# Patient Record
Sex: FEMALE | Race: BLACK OR AFRICAN AMERICAN | NOT HISPANIC OR LATINO
[De-identification: names, ages, dates, MRNs, and addresses within clinical notes are randomized per-mention and may not be internally consistent; named-entity substitution may affect disease eponyms.]

---

## 2020-11-09 ENCOUNTER — TRANSCRIPTION ENCOUNTER (OUTPATIENT)
Age: 25
End: 2020-11-09

## 2021-01-01 ENCOUNTER — INPATIENT (INPATIENT)
Facility: HOSPITAL | Age: 26
LOS: 12 days | Discharge: HOME HEALTH | End: 2021-01-14
Attending: STUDENT IN AN ORGANIZED HEALTH CARE EDUCATION/TRAINING PROGRAM | Admitting: STUDENT IN AN ORGANIZED HEALTH CARE EDUCATION/TRAINING PROGRAM
Payer: COMMERCIAL

## 2021-01-01 VITALS
TEMPERATURE: 98 F | OXYGEN SATURATION: 99 % | RESPIRATION RATE: 24 BRPM | SYSTOLIC BLOOD PRESSURE: 125 MMHG | WEIGHT: 212.53 LBS | DIASTOLIC BLOOD PRESSURE: 77 MMHG | HEART RATE: 132 BPM

## 2021-01-01 RX ORDER — OXYCODONE HYDROCHLORIDE 5 MG/1
5 TABLET ORAL ONCE
Refills: 0 | Status: DISCONTINUED | OUTPATIENT
Start: 2021-01-01 | End: 2021-01-01

## 2021-01-01 RX ORDER — SODIUM CHLORIDE 9 MG/ML
1000 INJECTION, SOLUTION INTRAVENOUS ONCE
Refills: 0 | Status: COMPLETED | OUTPATIENT
Start: 2021-01-01 | End: 2021-01-01

## 2021-01-01 RX ADMIN — OXYCODONE HYDROCHLORIDE 5 MILLIGRAM(S): 5 TABLET ORAL at 23:41

## 2021-01-01 RX ADMIN — SODIUM CHLORIDE 1000 MILLILITER(S): 9 INJECTION, SOLUTION INTRAVENOUS at 23:44

## 2021-01-01 NOTE — ED ADULT NURSE NOTE - OBJECTIVE STATEMENT
patient complaining of pain to chest and back patient complaining of pain in central upper back and left chest pain and left upper arm pain that is worse with deep breath patient tachypneic with movement had recent travel to [place] and birth control pills

## 2021-01-01 NOTE — ED ADULT TRIAGE NOTE - CHIEF COMPLAINT QUOTE
PT C/O CP radiating to left arm and back x 3 days. States recently returned from [place] on Monday. Denies sick known sick contacts, daily meds. States had tonsillectomy 2 weeks ago; PT takes PO birth control.

## 2021-01-01 NOTE — ED PROVIDER NOTE - OBJECTIVE STATEMENT
Liborio: Tonight, pain in central upper back f/b L CP and L upper arm pain, worse w/ deep breath. HR ~130. Had 3.5 hr car ride a few weeks ago. On birth control. Had recent tonsillectomy. Not likely PNA: no infectious Sx (eg, purulent cough). HEART score low. No SOB. No palpitations. Not improve w/ Icy/Hot. No FHx VTE or early heart disease.     Also, R low back and buttock pain radiating down R leg.

## 2021-01-01 NOTE — ED ADULT NURSE NOTE - CHIEF COMPLAINT QUOTE
PT C/O CP radiating to left arm and back x 3 days. States recently returned from [place] on Monday. Denies sick known sick contacts, daily meds. States had tonsillectomy 2 weeks ago; PT takes PO birth control.

## 2021-01-01 NOTE — ED PROVIDER NOTE - CLINICAL SUMMARY MEDICAL DECISION MAKING FREE TEXT BOX
Liborio: Pleuritic CP and tachycardia. Concern for PE. Not likely PNA: no infectious Sx (eg, purulent cough). HEART score low (ie, not likely ACS). Get CT angio, HCG, labs.

## 2021-01-01 NOTE — ED PROVIDER NOTE - CARE PLAN
Principal Discharge DX:	Chest pain   Principal Discharge DX:	Pulmonary embolism  Secondary Diagnosis:	Renal infarct  Secondary Diagnosis:	DVT (deep venous thrombosis)

## 2021-01-01 NOTE — ED PROVIDER NOTE - ATTENDING CONTRIBUTION TO CARE
I performed a face-to-face evaluation of the patient and performed a history and physical examination. I agree with the history and physical examination.    Pleuritic CP and tachycardia. Concern for PE. Not likely PNA: no infectious Sx (eg, purulent cough). HEART score low (ie, not likely ACS). Get CT angio, HCG, labs.

## 2021-01-01 NOTE — ED PROVIDER NOTE - PHYSICAL EXAMINATION
Well appearing, well nourished, awake, alert, oriented to person, place, time/situation and in no apparent distress.    Airway patent    Eyes without scleral injection. No jaundice.    Strong pulse.    Respirations unlabored. Lungs clear.     Abdomen soft, non-tender, no guarding. No CVAT.    Spine appears normal, range of motion is not limited, no muscle or joint tenderness.    Alert and oriented, no gross motor or sensory deficits.    Skin normal color for race, warm, dry and intact. No evidence of rash.    No SI/HI.

## 2021-01-02 DIAGNOSIS — I26.99 OTHER PULMONARY EMBOLISM WITHOUT ACUTE COR PULMONALE: ICD-10-CM

## 2021-01-02 DIAGNOSIS — Z90.89 ACQUIRED ABSENCE OF OTHER ORGANS: Chronic | ICD-10-CM

## 2021-01-02 DIAGNOSIS — R52 PAIN, UNSPECIFIED: ICD-10-CM

## 2021-01-02 DIAGNOSIS — I80.219 PHLEBITIS AND THROMBOPHLEBITIS OF UNSPECIFIED ILIAC VEIN: ICD-10-CM

## 2021-01-02 DIAGNOSIS — Z02.9 ENCOUNTER FOR ADMINISTRATIVE EXAMINATIONS, UNSPECIFIED: ICD-10-CM

## 2021-01-02 DIAGNOSIS — N28.0 ISCHEMIA AND INFARCTION OF KIDNEY: ICD-10-CM

## 2021-01-02 DIAGNOSIS — I82.423 ACUTE EMBOLISM AND THROMBOSIS OF ILIAC VEIN, BILATERAL: ICD-10-CM

## 2021-01-02 LAB
ALBUMIN SERPL ELPH-MCNC: 4 G/DL — SIGNIFICANT CHANGE UP (ref 3.3–5)
ALP SERPL-CCNC: 52 U/L — SIGNIFICANT CHANGE UP (ref 40–120)
ALT FLD-CCNC: 42 U/L — HIGH (ref 4–33)
ANION GAP SERPL CALC-SCNC: 15 MMOL/L — HIGH (ref 7–14)
APPEARANCE UR: ABNORMAL
APTT BLD: 144.2 SEC — CRITICAL HIGH (ref 27–36.3)
APTT BLD: 28.3 SEC — SIGNIFICANT CHANGE UP (ref 27–36.3)
APTT BLD: 60.9 SEC — HIGH (ref 27–36.3)
AST SERPL-CCNC: 33 U/L — HIGH (ref 4–32)
AT III ACT/NOR PPP CHRO: 86 % — SIGNIFICANT CHANGE UP (ref 76–140)
BASOPHILS # BLD AUTO: 0 K/UL — SIGNIFICANT CHANGE UP (ref 0–0.2)
BASOPHILS NFR BLD AUTO: 0 % — SIGNIFICANT CHANGE UP (ref 0–2)
BILIRUB SERPL-MCNC: 0.3 MG/DL — SIGNIFICANT CHANGE UP (ref 0.2–1.2)
BILIRUB UR-MCNC: NEGATIVE — SIGNIFICANT CHANGE UP
BUN SERPL-MCNC: 6 MG/DL — LOW (ref 7–23)
CALCIUM SERPL-MCNC: 9.5 MG/DL — SIGNIFICANT CHANGE UP (ref 8.4–10.5)
CHLORIDE SERPL-SCNC: 102 MMOL/L — SIGNIFICANT CHANGE UP (ref 98–107)
CO2 SERPL-SCNC: 21 MMOL/L — LOW (ref 22–31)
COLOR SPEC: YELLOW — SIGNIFICANT CHANGE UP
CREAT SERPL-MCNC: 0.93 MG/DL — SIGNIFICANT CHANGE UP (ref 0.5–1.3)
DIFF PNL FLD: ABNORMAL
EOSINOPHIL # BLD AUTO: 0.19 K/UL — SIGNIFICANT CHANGE UP (ref 0–0.5)
EOSINOPHIL NFR BLD AUTO: 1.8 % — SIGNIFICANT CHANGE UP (ref 0–6)
GLUCOSE SERPL-MCNC: 94 MG/DL — SIGNIFICANT CHANGE UP (ref 70–99)
GLUCOSE UR QL: NEGATIVE — SIGNIFICANT CHANGE UP
HCG SERPL-ACNC: <5 MIU/ML — SIGNIFICANT CHANGE UP
HCT VFR BLD CALC: 29.2 % — LOW (ref 34.5–45)
HCT VFR BLD CALC: 35.1 % — SIGNIFICANT CHANGE UP (ref 34.5–45)
HGB BLD-MCNC: 10.2 G/DL — LOW (ref 11.5–15.5)
HGB BLD-MCNC: 9 G/DL — LOW (ref 11.5–15.5)
IANC: 7.31 K/UL — SIGNIFICANT CHANGE UP (ref 1.5–8.5)
INR BLD: 1.32 RATIO — HIGH (ref 0.88–1.16)
KETONES UR-MCNC: ABNORMAL
LEUKOCYTE ESTERASE UR-ACNC: ABNORMAL
LYMPHOCYTES # BLD AUTO: 1.85 K/UL — SIGNIFICANT CHANGE UP (ref 1–3.3)
LYMPHOCYTES # BLD AUTO: 17.5 % — SIGNIFICANT CHANGE UP (ref 13–44)
MAGNESIUM SERPL-MCNC: 2.3 MG/DL — SIGNIFICANT CHANGE UP (ref 1.6–2.6)
MCHC RBC-ENTMCNC: 20.4 PG — LOW (ref 27–34)
MCHC RBC-ENTMCNC: 21.1 PG — LOW (ref 27–34)
MCHC RBC-ENTMCNC: 29.1 GM/DL — LOW (ref 32–36)
MCHC RBC-ENTMCNC: 30.8 GM/DL — LOW (ref 32–36)
MCV RBC AUTO: 68.4 FL — LOW (ref 80–100)
MCV RBC AUTO: 70.3 FL — LOW (ref 80–100)
MONOCYTES # BLD AUTO: 0.74 K/UL — SIGNIFICANT CHANGE UP (ref 0–0.9)
MONOCYTES NFR BLD AUTO: 7 % — SIGNIFICANT CHANGE UP (ref 2–14)
NEUTROPHILS # BLD AUTO: 7.68 K/UL — HIGH (ref 1.8–7.4)
NEUTROPHILS NFR BLD AUTO: 72.8 % — SIGNIFICANT CHANGE UP (ref 43–77)
NITRITE UR-MCNC: NEGATIVE — SIGNIFICANT CHANGE UP
NRBC # BLD: 0 /100 WBCS — SIGNIFICANT CHANGE UP
NRBC # FLD: 0 K/UL — SIGNIFICANT CHANGE UP
PH UR: 6.5 — SIGNIFICANT CHANGE UP (ref 5–8)
PHOSPHATE SERPL-MCNC: 1.2 MG/DL — LOW (ref 2.5–4.5)
PLATELET # BLD AUTO: 316 K/UL — SIGNIFICANT CHANGE UP (ref 150–400)
PLATELET # BLD AUTO: 360 K/UL — SIGNIFICANT CHANGE UP (ref 150–400)
POTASSIUM SERPL-MCNC: 3.5 MMOL/L — SIGNIFICANT CHANGE UP (ref 3.5–5.3)
POTASSIUM SERPL-SCNC: 3.5 MMOL/L — SIGNIFICANT CHANGE UP (ref 3.5–5.3)
PROT SERPL-MCNC: 8.1 G/DL — SIGNIFICANT CHANGE UP (ref 6–8.3)
PROT UR-MCNC: ABNORMAL
PROTHROM AB SERPL-ACNC: 15 SEC — HIGH (ref 10.6–13.6)
RBC # BLD: 4.27 M/UL — SIGNIFICANT CHANGE UP (ref 3.8–5.2)
RBC # BLD: 4.99 M/UL — SIGNIFICANT CHANGE UP (ref 3.8–5.2)
RBC # FLD: 15.2 % — HIGH (ref 10.3–14.5)
RBC # FLD: 15.3 % — HIGH (ref 10.3–14.5)
SARS-COV-2 RNA SPEC QL NAA+PROBE: SIGNIFICANT CHANGE UP
SODIUM SERPL-SCNC: 138 MMOL/L — SIGNIFICANT CHANGE UP (ref 135–145)
SP GR SPEC: 1.02 — SIGNIFICANT CHANGE UP (ref 1.01–1.02)
TROPONIN T, HIGH SENSITIVITY RESULT: <6 NG/L — SIGNIFICANT CHANGE UP
UROBILINOGEN FLD QL: ABNORMAL
WBC # BLD: 10.55 K/UL — HIGH (ref 3.8–10.5)
WBC # BLD: 11 K/UL — HIGH (ref 3.8–10.5)
WBC # FLD AUTO: 10.55 K/UL — HIGH (ref 3.8–10.5)
WBC # FLD AUTO: 11 K/UL — HIGH (ref 3.8–10.5)

## 2021-01-02 PROCEDURE — 99285 EMERGENCY DEPT VISIT HI MDM: CPT

## 2021-01-02 PROCEDURE — 93971 EXTREMITY STUDY: CPT | Mod: 26,LT

## 2021-01-02 PROCEDURE — 74177 CT ABD & PELVIS W/CONTRAST: CPT | Mod: 26

## 2021-01-02 PROCEDURE — 71275 CT ANGIOGRAPHY CHEST: CPT | Mod: 26

## 2021-01-02 PROCEDURE — 99223 1ST HOSP IP/OBS HIGH 75: CPT | Mod: GC

## 2021-01-02 RX ORDER — HEPARIN SODIUM 5000 [USP'U]/ML
4000 INJECTION INTRAVENOUS; SUBCUTANEOUS EVERY 6 HOURS
Refills: 0 | Status: DISCONTINUED | OUTPATIENT
Start: 2021-01-02 | End: 2021-01-09

## 2021-01-02 RX ORDER — INFLUENZA VIRUS VACCINE 15; 15; 15; 15 UG/.5ML; UG/.5ML; UG/.5ML; UG/.5ML
0.5 SUSPENSION INTRAMUSCULAR ONCE
Refills: 0 | Status: DISCONTINUED | OUTPATIENT
Start: 2021-01-02 | End: 2021-01-14

## 2021-01-02 RX ORDER — MORPHINE SULFATE 50 MG/1
2 CAPSULE, EXTENDED RELEASE ORAL EVERY 4 HOURS
Refills: 0 | Status: DISCONTINUED | OUTPATIENT
Start: 2021-01-02 | End: 2021-01-04

## 2021-01-02 RX ORDER — HEPARIN SODIUM 5000 [USP'U]/ML
8000 INJECTION INTRAVENOUS; SUBCUTANEOUS ONCE
Refills: 0 | Status: COMPLETED | OUTPATIENT
Start: 2021-01-02 | End: 2021-01-02

## 2021-01-02 RX ORDER — MORPHINE SULFATE 50 MG/1
4 CAPSULE, EXTENDED RELEASE ORAL EVERY 6 HOURS
Refills: 0 | Status: DISCONTINUED | OUTPATIENT
Start: 2021-01-02 | End: 2021-01-02

## 2021-01-02 RX ORDER — ACETAMINOPHEN 500 MG
650 TABLET ORAL EVERY 6 HOURS
Refills: 0 | Status: DISCONTINUED | OUTPATIENT
Start: 2021-01-02 | End: 2021-01-04

## 2021-01-02 RX ORDER — MORPHINE SULFATE 50 MG/1
4 CAPSULE, EXTENDED RELEASE ORAL ONCE
Refills: 0 | Status: DISCONTINUED | OUTPATIENT
Start: 2021-01-02 | End: 2021-01-02

## 2021-01-02 RX ORDER — HEPARIN SODIUM 5000 [USP'U]/ML
8000 INJECTION INTRAVENOUS; SUBCUTANEOUS EVERY 6 HOURS
Refills: 0 | Status: DISCONTINUED | OUTPATIENT
Start: 2021-01-02 | End: 2021-01-09

## 2021-01-02 RX ORDER — HEPARIN SODIUM 5000 [USP'U]/ML
INJECTION INTRAVENOUS; SUBCUTANEOUS
Qty: 25000 | Refills: 0 | Status: DISCONTINUED | OUTPATIENT
Start: 2021-01-02 | End: 2021-01-09

## 2021-01-02 RX ADMIN — MORPHINE SULFATE 2 MILLIGRAM(S): 50 CAPSULE, EXTENDED RELEASE ORAL at 14:56

## 2021-01-02 RX ADMIN — HEPARIN SODIUM 1800 UNIT(S)/HR: 5000 INJECTION INTRAVENOUS; SUBCUTANEOUS at 03:26

## 2021-01-02 RX ADMIN — HEPARIN SODIUM 1500 UNIT(S)/HR: 5000 INJECTION INTRAVENOUS; SUBCUTANEOUS at 21:16

## 2021-01-02 RX ADMIN — HEPARIN SODIUM 1500 UNIT(S)/HR: 5000 INJECTION INTRAVENOUS; SUBCUTANEOUS at 12:25

## 2021-01-02 RX ADMIN — HEPARIN SODIUM 5000 UNIT(S): 5000 INJECTION INTRAVENOUS; SUBCUTANEOUS at 03:29

## 2021-01-02 RX ADMIN — MORPHINE SULFATE 4 MILLIGRAM(S): 50 CAPSULE, EXTENDED RELEASE ORAL at 05:23

## 2021-01-02 RX ADMIN — MORPHINE SULFATE 2 MILLIGRAM(S): 50 CAPSULE, EXTENDED RELEASE ORAL at 19:04

## 2021-01-02 RX ADMIN — OXYCODONE HYDROCHLORIDE 5 MILLIGRAM(S): 5 TABLET ORAL at 00:10

## 2021-01-02 RX ADMIN — MORPHINE SULFATE 2 MILLIGRAM(S): 50 CAPSULE, EXTENDED RELEASE ORAL at 23:14

## 2021-01-02 RX ADMIN — MORPHINE SULFATE 4 MILLIGRAM(S): 50 CAPSULE, EXTENDED RELEASE ORAL at 03:18

## 2021-01-02 RX ADMIN — MORPHINE SULFATE 4 MILLIGRAM(S): 50 CAPSULE, EXTENDED RELEASE ORAL at 09:27

## 2021-01-02 RX ADMIN — HEPARIN SODIUM 0 UNIT(S)/HR: 5000 INJECTION INTRAVENOUS; SUBCUTANEOUS at 11:18

## 2021-01-02 RX ADMIN — MORPHINE SULFATE 4 MILLIGRAM(S): 50 CAPSULE, EXTENDED RELEASE ORAL at 03:34

## 2021-01-02 RX ADMIN — MORPHINE SULFATE 2 MILLIGRAM(S): 50 CAPSULE, EXTENDED RELEASE ORAL at 18:49

## 2021-01-02 RX ADMIN — MORPHINE SULFATE 4 MILLIGRAM(S): 50 CAPSULE, EXTENDED RELEASE ORAL at 04:59

## 2021-01-02 RX ADMIN — MORPHINE SULFATE 4 MILLIGRAM(S): 50 CAPSULE, EXTENDED RELEASE ORAL at 09:12

## 2021-01-02 RX ADMIN — MORPHINE SULFATE 2 MILLIGRAM(S): 50 CAPSULE, EXTENDED RELEASE ORAL at 14:42

## 2021-01-02 RX ADMIN — Medication 85 MILLIMOLE(S): at 04:52

## 2021-01-02 RX ADMIN — MORPHINE SULFATE 2 MILLIGRAM(S): 50 CAPSULE, EXTENDED RELEASE ORAL at 23:03

## 2021-01-02 NOTE — H&P ADULT - PROBLEM SELECTOR PLAN 4
- Tylenol 650mg PRN q6 for mild/moderate pain  - Morphine 4mg IV push PRN q6 for severe pain  - Revaluate and adjust as needed

## 2021-01-02 NOTE — H&P ADULT - NSHPSOCIALHISTORY_GEN_ALL_CORE
Lives with parents and siblings. Social drinker. Nonsmoker. No rec drug use. Sexually active w/ birth control pills. No hx of pregnancy.

## 2021-01-02 NOTE — H&P ADULT - NSHPREVIEWOFSYSTEMS_GEN_ALL_CORE
Review of Systems:  General: [] Fever [] Chills [] Fatigue [] Weight change  Head: [] Trauma [] Headache [] Confusion [] Lightheadedness [] Vision changes [] Hearing changes  Neck: [] Sore throat [] Neck Pain  Cardiac: [x] Chest pain [] Palpitations  Pulmonary: [x] Dyspnea [] Cough [] Orthopnea [] Sputum Production [] Hemoptysis  Gastrointestinal: [] Constipation [] Diarrhea [] Dysphagia [] Indigestion [] Abdominal Pain  : [] Urgency [] Dysuria [] Nocturia  Musculoskeletal: [x] Pain [] Redness [] Swelling [] Abnormal ROM  Skin: [] Rash [] Bruising [] Sores

## 2021-01-02 NOTE — H&P ADULT - NSHPPHYSICALEXAM_GEN_ALL_CORE
PHYSICAL EXAM:  GENERAL: Anxious appearing, well-developed, lying on hospital bed in street clothes  HEAD:  Atraumatic, Normocephalic  EYES: EOMI, PERRLA, conjunctiva and sclera clear  NECK: Supple, No JVD  CHEST/LUNG: Clear to auscultation bilaterally; No wheeze; tachypneic  HEART: Tachycardic, gallop noted  ABDOMEN: Soft, tender in upper quadrants, Nondistended; Bowel sounds present; left lower cva tenderness  EXTREMITIES:  2+ Peripheral Pulses, No clubbing, cyanosis, or edema, tender to palpation in thighs and left arm, no palpable clot  PSYCH: AAOx3  NEUROLOGY: non-focal  SKIN: No rashes or lesions

## 2021-01-02 NOTE — H&P ADULT - NSHPLABSRESULTS_GEN_ALL_CORE
10.2   10.55 )-----------( 360      ( 02 Jan 2021 00:17 )             35.1     01-02    138  |  102  |  6<L>  ----------------------------<  94  3.5   |  21<L>  |  0.93    Ca    9.5      02 Jan 2021 00:17  Phos  1.2     01-02  Mg     2.3     01-02    TPro  8.1  /  Alb  4.0  /  TBili  0.3  /  DBili  x   /  AST  33<H>  /  ALT  42<H>  /  AlkPhos  52  01-02    < from: CT Abdomen and Pelvis w/ IV Cont (01.02.21 @ 01:30) >    FINDINGS:  CHEST:  LUNGS AND LARGE AIRWAYS: Patent central airways. Left lower lobe wedge-shaped peripheral groundglass opacity with areas of central lucency, likely infarct.  PLEURA: No pleural effusion.  VESSELS: Right upper lobe lobar pulmonary embolism extending into the segmental branches. Right lower lobe lobar pulmonary embolism extending into the segmental branches. Left lower lobe lobar pulmonary embolism extending into the segmental branches.  HEART: Heart size is normal. No pericardial effusion. Right heart is not enlarged. MEDIASTINUM AND STACI: No lymphadenopathy.  CHEST WALL AND LOWER NECK: Heterogeneous bilateral thyroid glands.    ABDOMEN AND PELVIS:  LIVER: Within normal limits.  BILE DUCTS: Normal caliber.  GALLBLADDER: Cholelithiasis.  SPLEEN: Within normal limits.  PANCREAS: Within normal limits.  ADRENALS: Within normal limits.  KIDNEYS/URETERS: Wedge-shaped left lower pole renal hypodensity, likely infarct. No stonesor hydronephrosis    BLADDER: Within normal limits.  REPRODUCTIVE ORGANS: Uterus and adnexa within normal limits.    BOWEL: No bowel obstruction. Appendix is normal.  PERITONEUM: No ascites.  VESSELS: Bilateral common iliac DVT with possible extension into the bilateral internal iliac veins.  RETROPERITONEUM/LYMPH NODES: No lymphadenopathy.  ABDOMINAL WALL: Within normal limits.  BONES: Within normal limits.    IMPRESSION:  Bilateral lobar pulmonary embolism extending into the segmental branches with left lower lobe pulmonary infarct, as above.    Bilateral common iliac DVT extending into the bilateral internal iliac veins.    Left lower pole renal infarct.    Cholelithiasis without cholecystitis.    Comment: Coexistence of pulmonary embolism/infarct and renal infarct may suggest the presence of right to left shunt. Further evaluation with echocardiogram is recommended.    < end of copied text >    < from: US Duplex Venous Lower Ext Ltd, Left (01.02.21 @ 02:52) >    FINDINGS:    There is normal compressibility of the left common femoral, femoral and popliteal veins.  The contralateral common femoral vein is patent.    Absent respiratory variation in the left common femoral vein    No calf vein thrombosis is detected.    IMPRESSION:  No evidence of left lower extremity deep venous thrombosis below the inguinal ligament.    Absent respiratory variation in the left common femoral vein may be secondary to left common iliac vein DVT seen on CT abdomen pelvis from same day    < end of copied text >

## 2021-01-02 NOTE — H&P ADULT - ASSESSMENT
25F w/ recent tonsillectomy, nonsmoker, using birth control presenting w/ 1 day of SOB, chest pain, back pain, and leg pain w/ no other medical history. In the ED, imaging revealed bilateral DVT w/ high clot burden, bilateral PE, and renal infarct; patient was heparinized. Now pending TTE for evaluation of r to l shunt.

## 2021-01-02 NOTE — H&P ADULT - HISTORY OF PRESENT ILLNESS
25F with no previous medical history other than tonsilectomy 2 weeks ago presenting with 1 day of back, lower left leg, chest, and arm pain. Per patient, she was recovering from her tonsilectomy when she developed severe lower back and leg pain radiating down her leg. She then developed chest pain and difficulty catching her breath. Her chest pain is pleuritic and improves upon sitting. She denies headache, dizziness, confusion, other neuro symptoms, palpitations, n/v, recent trauma, dysuria. She has no family history of clotting disorders, is a nonsmoker, and uses oral contraceptives for birth control.    In the ED, CT scan revealed bilateral common iliac DVT extending into internal illiac, bilateral PE, and renal infarct. EKG was unremarkable. Patient was given IV morphine for pain control, full coagulation workup was sent, and patient was admitted to medicine. Patient given heparin for anticoagulation.

## 2021-01-02 NOTE — H&P ADULT - ATTENDING COMMENTS
Pt seen and examined, chart and labs reviewed.  A 25F with no major med hx however with recently tonsillectomy 2 weeks ago and largely bedbound for prolonged period, recent car travel to and from [place] and on OCPs, who presents with pleuritic CP/L lower back pain, found to have bilateral PEs with L lower lobe infarct, L renal pole infart and b/l common iliac DVTs.  Pt with no smoking history, no family history of clots, no history of pregnancies/miscarriages.     #Hypercoagulable state, new b/l DVT and PE (provoked in setting of immobility/OCP use):  - Agree with hep gtt for now given extensive clot burden and body habitus  - Hope to transition to NOAC soon   - ER sent preliminary hypercoagulable workup, however most of the panel will be inaccurate in setting of acute clot Pt seen and examined, chart and labs reviewed.  A 25F with no major med hx however with recently tonsillectomy 2 weeks ago and largely bedbound for prolonged period, recent car travel to and from [place] and on OCPs, who presents with pleuritic CP/L lower back pain, found to have bilateral PEs with L lower lobe infarct, L renal pole infart and b/l common iliac DVTs.  Pt with no smoking history, no family history of clots, no history of pregnancies/miscarriages.     #Hypercoagulable state, new b/l DVT and PE (provoked in setting of immobility/OCP use):  - Agree with hep gtt for now given extensive clot burden and body habitus  - Hope to transition to NOAC soon however NOAC not extensively studied with renal infarct, Coumadin may be best option for her  - ER sent preliminary hypercoagulable workup, however most of the panel will be inaccurate in setting of acute clot  - TTE pending to r/o PFO

## 2021-01-02 NOTE — H&P ADULT - PROBLEM SELECTOR PLAN 3
Denies dysuria and other renal complaints. Cr 0.93 on admission.  - Pain management as below  - UA on admission w/ bacteria, blood. However, also with many epithelial cells, likely contaminant. No need for antibiotics presently.  - UC sent in ED, f/u results.

## 2021-01-02 NOTE — H&P ADULT - PROBLEM SELECTOR PLAN 2
Hx of OCP use  - Anticoagulated on heparin  - s/p doppler  - Pain management as below  - F/u coagulapathy workup

## 2021-01-02 NOTE — H&P ADULT - PROBLEM SELECTOR PLAN 1
CT angio w/ bilateral PE. On heparin.  - Q4 vitals  - O2 support as needed  - Pain management as below  - TTE w/ bubble study pending

## 2021-01-03 ENCOUNTER — TRANSCRIPTION ENCOUNTER (OUTPATIENT)
Age: 26
End: 2021-01-03

## 2021-01-03 LAB
ALBUMIN SERPL ELPH-MCNC: 3.6 G/DL — SIGNIFICANT CHANGE UP (ref 3.3–5)
ALP SERPL-CCNC: 47 U/L — SIGNIFICANT CHANGE UP (ref 40–120)
ALT FLD-CCNC: 25 U/L — SIGNIFICANT CHANGE UP (ref 4–33)
ANION GAP SERPL CALC-SCNC: 13 MMOL/L — SIGNIFICANT CHANGE UP (ref 7–14)
APTT BLD: 74.7 SEC — HIGH (ref 27–36.3)
AST SERPL-CCNC: 17 U/L — SIGNIFICANT CHANGE UP (ref 4–32)
BILIRUB SERPL-MCNC: 0.2 MG/DL — SIGNIFICANT CHANGE UP (ref 0.2–1.2)
BUN SERPL-MCNC: 3 MG/DL — LOW (ref 7–23)
CALCIUM SERPL-MCNC: 9.3 MG/DL — SIGNIFICANT CHANGE UP (ref 8.4–10.5)
CHLORIDE SERPL-SCNC: 99 MMOL/L — SIGNIFICANT CHANGE UP (ref 98–107)
CO2 SERPL-SCNC: 24 MMOL/L — SIGNIFICANT CHANGE UP (ref 22–31)
CREAT SERPL-MCNC: 0.91 MG/DL — SIGNIFICANT CHANGE UP (ref 0.5–1.3)
CULTURE RESULTS: SIGNIFICANT CHANGE UP
GLUCOSE SERPL-MCNC: 68 MG/DL — LOW (ref 70–99)
HCT VFR BLD CALC: 30.2 % — LOW (ref 34.5–45)
HGB BLD-MCNC: 9 G/DL — LOW (ref 11.5–15.5)
MAGNESIUM SERPL-MCNC: 2.1 MG/DL — SIGNIFICANT CHANGE UP (ref 1.6–2.6)
MCHC RBC-ENTMCNC: 20.5 PG — LOW (ref 27–34)
MCHC RBC-ENTMCNC: 29.8 GM/DL — LOW (ref 32–36)
MCV RBC AUTO: 68.8 FL — LOW (ref 80–100)
NRBC # BLD: 0 /100 WBCS — SIGNIFICANT CHANGE UP
NRBC # FLD: 0 K/UL — SIGNIFICANT CHANGE UP
PHOSPHATE SERPL-MCNC: 3.7 MG/DL — SIGNIFICANT CHANGE UP (ref 2.5–4.5)
PLATELET # BLD AUTO: 348 K/UL — SIGNIFICANT CHANGE UP (ref 150–400)
POTASSIUM SERPL-MCNC: 3.6 MMOL/L — SIGNIFICANT CHANGE UP (ref 3.5–5.3)
POTASSIUM SERPL-SCNC: 3.6 MMOL/L — SIGNIFICANT CHANGE UP (ref 3.5–5.3)
PROT SERPL-MCNC: 7.2 G/DL — SIGNIFICANT CHANGE UP (ref 6–8.3)
RBC # BLD: 4.39 M/UL — SIGNIFICANT CHANGE UP (ref 3.8–5.2)
RBC # FLD: 15.5 % — HIGH (ref 10.3–14.5)
SARS-COV-2 IGG SERPL QL IA: NEGATIVE — SIGNIFICANT CHANGE UP
SARS-COV-2 IGM SERPL IA-ACNC: <0.1 INDEX — SIGNIFICANT CHANGE UP
SODIUM SERPL-SCNC: 136 MMOL/L — SIGNIFICANT CHANGE UP (ref 135–145)
SPECIMEN SOURCE: SIGNIFICANT CHANGE UP
WBC # BLD: 10.23 K/UL — SIGNIFICANT CHANGE UP (ref 3.8–10.5)
WBC # FLD AUTO: 10.23 K/UL — SIGNIFICANT CHANGE UP (ref 3.8–10.5)

## 2021-01-03 PROCEDURE — 99233 SBSQ HOSP IP/OBS HIGH 50: CPT | Mod: GC

## 2021-01-03 RX ADMIN — Medication 650 MILLIGRAM(S): at 18:51

## 2021-01-03 RX ADMIN — HEPARIN SODIUM 1500 UNIT(S)/HR: 5000 INJECTION INTRAVENOUS; SUBCUTANEOUS at 04:36

## 2021-01-03 RX ADMIN — Medication 650 MILLIGRAM(S): at 11:10

## 2021-01-03 RX ADMIN — Medication 650 MILLIGRAM(S): at 19:51

## 2021-01-03 RX ADMIN — Medication 650 MILLIGRAM(S): at 10:36

## 2021-01-03 NOTE — DISCHARGE NOTE PROVIDER - NSDCFUADDAPPT_GEN_ALL_CORE_FT
PRIMARY CARE: [address] 1PM  [address] [place] [address] 1:00PM PRIMARY CARE: [address] [place] [address]. [place] [address] 1:00PM [phone]    VASCULAR SURGERY: Please call [phone] to schedule an appointment with Dr. Pham    HEMATOLOGY/ONCOLOGY: You will be contacted for an appointment at [facility]    RHEUMATOLOGY: You will be contacted for an appointment. The clinic number is available above.

## 2021-01-03 NOTE — DISCHARGE NOTE PROVIDER - NSDCCPTREATMENT_GEN_ALL_CORE_FT
PRINCIPAL PROCEDURE  Procedure: CT angiogram chest w contrast  Findings and Treatment: FINDINGS:  CHEST:  LUNGS AND LARGE AIRWAYS: Patent central airways. Left lower lobe wedge-shaped peripheral groundglass opacity with areas of central lucency, likely infarct.  PLEURA: No pleural effusion.  VESSELS: Right upper lobe lobar pulmonary embolism extending into the segmental branches. Right lower lobe lobar pulmonary embolism extending into the segmental branches. Left lower lobe lobar pulmonary embolism extending into the segmental branches.  HEART: Heart size is normal. No pericardial effusion. Right heart is not enlarged. MEDIASTINUM AND STACI: No lymphadenopathy.  CHEST WALL AND LOWER NECK: Heterogeneous bilateral thyroid glands.  ABDOMEN AND PELVIS:  LIVER: Within normal limits.  BILE DUCTS: Normal caliber.  GALLBLADDER: Cholelithiasis.  SPLEEN: Within normal limits.  PANCREAS: Within normal limits.  ADRENALS: Within normal limits.  KIDNEYS/URETERS: Wedge-shaped left lower pole renal hypodensity, likely infarct. No stonesor hydronephrosis  BLADDER: Within normal limits.  REPRODUCTIVE ORGANS: Uterus and adnexa within normal limits.  BOWEL: No bowel obstruction. Appendix is normal.  PERITONEUM: No ascites.  VESSELS: Bilateral common iliac DVT with possible extension into the bilateral internal iliac veins.  RETROPERITONEUM/LYMPH NODES: No lymphadenopathy.  ABDOMINAL WALL: Within normal limits.  BONES: Within normal limits.  IMPRESSION:  Bilateral lobar pulmonary embolism extending into the segmental branches with left lower lobe pulmonary infarct, as above.  Bilateral common iliac DVT extending into the bilateral internal iliac veins.  Left lower pole renal infarct.  Cholelithiasis without cholecystitis.  Comment: Coexistence of pulmonary embolism/infarct and renal infarct may suggest the presence of right to left shunt. Further evaluation with echocardiogram is recommended.

## 2021-01-03 NOTE — DISCHARGE NOTE PROVIDER - HOSPITAL COURSE
25F with no previous medical history other than tonsilectomy 2 weeks ago presenting with 1 day of back, lower left leg, chest, and arm pain. Per patient, she was recovering from her tonsilectomy when she developed severe lower back and leg pain radiating down her leg. She then developed chest pain and difficulty catching her breath. Her chest pain is pleuritic and improves upon sitting. She denies headache, dizziness, confusion, other neuro symptoms, palpitations, n/v, recent trauma, dysuria. She has no family history of clotting disorders, is a nonsmoker, and uses oral contraceptives for birth control.    In the ED, CT scan revealed bilateral common iliac DVT extending into internal illiac, bilateral PE, and renal infarct. EKG was unremarkable. Patient was given IV morphine for pain control, full coagulation workup was sent, and patient was admitted to medicine. Patient given heparin for anticoagulation. TTE revealed _________________ 25F with no previous medical history other than tonsilectomy 2 weeks ago presenting with 1 day of back, lower left leg, chest, and arm pain. Per patient, she was recovering from her tonsilectomy when she developed severe lower back and leg pain radiating down her leg. She then developed chest pain and difficulty catching her breath. Her chest pain is pleuritic and improves upon sitting. She denies headache, dizziness, confusion, other neuro symptoms, palpitations, n/v, recent trauma, dysuria. She has no family history of clotting disorders, is a nonsmoker, and uses oral contraceptives for birth control.    In the ED, CT scan revealed bilateral common iliac DVT extending into internal illiac, bilateral PE, and renal infarct. EKG was unremarkable. Patient was given IV morphine for pain control, full coagulation workup was sent, and patient was admitted to medicine. Patient given heparin for anticoagulation. TTE with bubble study revealed left to right shunting, and a subsequent REJI confirmed a shunt, possibly from PFO. Cardiology saw the patient and did not see a need for acute intervention, recommended outpatient follow-up.     During her hospitalization, patient described further swelling in her left thigh as well as increased pain. Vascular was consulted for need of thrombectomy or potential compartment syndrome, and they determined there was no need for thrombectomy and low suspicion for compartment syndrome. Her anticoagulation was transitioned from heparin to fondaparinaux for more steady-state dosing. Per heme/onc and rheumatology, multiple labs were ordered including APS labs and atypical APS labs and hypercoagulable work-up which showed __________.    Patient was told to follow-up with her primary care provider, heme/onc, as well as rheumatology and the thrombosis and anticoagulation clinic.    Patient is stable and medically cleared for discharge. 25F with no previous medical history other than tonsilectomy 2 weeks ago presenting with 1 day of back, lower left leg, chest, and arm pain. Per patient, she was recovering from her tonsilectomy when she developed severe lower back and leg pain radiating down her leg. She then developed chest pain and difficulty catching her breath. Her chest pain is pleuritic and improves upon sitting. She denies headache, dizziness, confusion, other neuro symptoms, palpitations, n/v, recent trauma, dysuria. She has no family history of clotting disorders, is a nonsmoker, and uses oral contraceptives for birth control.    In the ED, CT scan revealed bilateral common iliac DVT extending into internal illiac, bilateral PE, and renal infarct. EKG was unremarkable. Patient was given IV morphine for pain control, full coagulation workup was sent, and patient was admitted to medicine. Patient given heparin for anticoagulation. TTE with bubble study revealed left to right shunting, and a subsequent REJI confirmed a shunt, possibly from PFO. Cardiology saw the patient and did not see a need for acute intervention, recommended outpatient follow-up.     During her hospitalization, patient described further swelling in her left thigh as well as increased pain. Vascular was consulted for need of thrombectomy or potential compartment syndrome, and they determined there was no need for thrombectomy and low suspicion for compartment syndrome. Her anticoagulation was transitioned from heparin to fondaparinaux for more steady-state dosing. Per heme/onc and rheumatology, multiple labs were ordered including APS labs and atypical APS labs and hypercoagulable work-up, results thus far are unrevealing.    Patient was told to follow-up with her primary care provider, heme/onc, as well as rheumatology and the thrombosis and anticoagulation clinic.    Patient remains stable and is cleared for discharge by hematology/oncology and rheumatology, to be discharged on fondaparinux with close outpatient follow up. PCP appointment scheduled on Monday 1/18. 25F with no previous medical history other than tonsillectomy a few weeks ago presented with 1 day of back, lower left leg, chest, and arm pain. Per patient, she was recovering from her tonsilectomy when she developed severe lower back and leg pain radiating down her leg. She then developed chest pain and difficulty catching her breath. Her chest pain is pleuritic and improves upon sitting. She denies headache, dizziness, confusion, other neuro symptoms, palpitations, n/v, recent trauma, dysuria. She has no family history of clotting disorders, is a nonsmoker, and uses oral contraceptives for birth control.    In the ED, CT scan revealed bilateral common iliac DVT extending into internal illiac, bilateral PE, and renal infarct. EKG was unremarkable. Patient was given IV morphine for pain control, full coagulation workup was sent, and patient was admitted to medicine. Patient given heparin for anticoagulation. TTE with bubble study revealed left to right shunting, and a subsequent REJI confirmed a shunt, possibly from PFO. Cardiology saw the patient and did not see a need for acute intervention, recommended outpatient follow-up.     During her hospitalization, patient described further swelling in her left thigh as well as increased pain. Vascular was consulted for need of thrombectomy or potential compartment syndrome, and they determined there was no need for thrombectomy and low suspicion for compartment syndrome. Her anticoagulation was transitioned from heparin to fondaparinaux for more steady-state dosing. Per heme/onc and rheumatology, multiple labs were ordered including APS labs and atypical APS labs and hypercoagulable work-up, results thus far are unrevealing. Records for tonsillectomy from outside hospital ([facility]) requested.    Patient remains stable and is cleared for discharge by hematology/oncology and rheumatology, to be discharged on fondaparinux with close outpatient follow up with heme/onc and rheumatology. PCP appointment scheduled for Monday 1/18/21

## 2021-01-03 NOTE — DISCHARGE NOTE PROVIDER - NSDCCPCAREPLAN_GEN_ALL_CORE_FT
PRINCIPAL DISCHARGE DIAGNOSIS  Diagnosis: Pulmonary embolism  Assessment and Plan of Treatment: A pulmonary embolism occurs when a clot travels to your lungs. Please continue to use your anticoagulation as prescribed in order to prevent recurrence of this issue. Please stop using your OCPs, as they increase your risk of recurrence.      SECONDARY DISCHARGE DIAGNOSES  Diagnosis: DVT (deep venous thrombosis)  Assessment and Plan of Treatment: It is important that you continue to take your anticoagulation to prevent further thrombosis. Please follow up with your hematologist in order to help figure out what caused this episode.    Diagnosis: Renal infarct  Assessment and Plan of Treatment:      PRINCIPAL DISCHARGE DIAGNOSIS  Diagnosis: Pulmonary embolism  Assessment and Plan of Treatment: You were found on imaging to have multiple pulmonary emboli in your lungs as well as DVTs in your leg. A pulmonary embolism occurs when a clot travels to your lungs. To determinte the etiology of the clots, labs and imaging were obtained which showed ________.    Please continue to use your anticoagulation as prescribed in order to prevent recurrence of this issue.   Please stop using your OCPs, as they increase your risk of recurrence.      SECONDARY DISCHARGE DIAGNOSES  Diagnosis: Patent foramen ovale  Assessment and Plan of Treatment: During your hospitalization, an echocardiogram was obtained which showed left to right shunting of the blood flow in your heart. This is most likely due to a patent foramen ovale for which cardiology saw you. They determined there was no need for acute intervention in the hospital at the time. Please follow-up with a cardiologist for further management of this patent foramen ovale as well as cardiac monitoring after your hospitalization.    Diagnosis: DVT (deep venous thrombosis)  Assessment and Plan of Treatment: It is important that you continue to take your anticoagulation to prevent further thrombosis. Please follow up with your hematologist in order to help figure out what caused this episode.    Diagnosis: Renal infarct  Assessment and Plan of Treatment:      PRINCIPAL DISCHARGE DIAGNOSIS  Diagnosis: Pulmonary embolism  Assessment and Plan of Treatment: You were found on imaging to have multiple pulmonary emboli in your lungs as well as deep vein thromboses in your leg. A pulmonary embolism occurs when a clot travels to your lungs. You were treated with anticoagulation. Please continue your fondaparinux 7.5 mg injection daily, and follow up with hematology/oncology, vascular surgery and rheumatology. You have a primary care appointment scheduled for this Monday.  A clear etiology was not yet determined, however an echocardiogram showed a patent foramen ovale, which can increase risk of blood clots traveling to the lung. The cause of the blood clots is not certain, however extensive blood work was sent.   STOP taking oral contraceptive pills, as estrogen increases risk of blood clots.  If you have worsening symptoms including shortness of breath or chest pain, seek medical care immediately. Please also seek emergency medical care for any uncontrolled bleeding episodes, as fondaparinux is a blood thinner.        SECONDARY DISCHARGE DIAGNOSES  Diagnosis: Patent foramen ovale  Assessment and Plan of Treatment: During your hospitalization, an echocardiogram was obtained which showed left to right shunting of the blood flow in your heart. This is most likely due to a patent foramen ovale for which cardiology saw you. They determined there was no need for acute intervention in the hospital at the time. Please follow-up with a cardiologist for further management of this patent foramen ovale as well as cardiac monitoring after your hospitalization.    Diagnosis: DVT (deep venous thrombosis)  Assessment and Plan of Treatment: It is important that you continue to take your anticoagulation to prevent further thrombosis. Please follow up with your hematologist in order to help figure out what caused this episode. Blood work is still pending.  Vascular surgery recommends compression stockings / keeping your left lower extremity ACE wrapped and elevated. These materials are available over the counter.  For pain, you can take Tylenol (over-the-counter) or if needed for severe pain, oxycodone 10 mg every 4 hours. Please follow up with primary care for refills if needed.    Diagnosis: Renal infarct  Assessment and Plan of Treatment: Continue your fondaparinux. If you have worsening abdominal pain or symptoms such as bloody urine, seek emergency medical care.     PRINCIPAL DISCHARGE DIAGNOSIS  Diagnosis: Pulmonary embolism  Assessment and Plan of Treatment: You were found on imaging to have multiple pulmonary emboli in your lungs as well as deep vein thromboses in your leg. A pulmonary embolism occurs when a clot travels to your lungs. You were treated with anticoagulation. Please continue your fondaparinux 7.5 mg injection daily, and follow up with hematology/oncology, vascular surgery and rheumatology. You have a primary care appointment scheduled for this Monday.  A clear etiology was not yet determined, however an echocardiogram showed a patent foramen ovale, which can increase risk of blood clots traveling to the lung. The cause of the blood clots is not certain, however extensive blood work was sent.   STOP taking oral contraceptive pills, as estrogen increases risk of blood clots.  If you have worsening symptoms including shortness of breath or chest pain, seek medical care immediately. Please also seek emergency medical care for any uncontrolled bleeding episodes, as fondaparinux is a blood thinner.        SECONDARY DISCHARGE DIAGNOSES  Diagnosis: Patent foramen ovale  Assessment and Plan of Treatment: During your hospitalization, an echocardiogram was obtained which showed left to right shunting of the blood flow in your heart. This is most likely due to a patent foramen ovale for which cardiology saw you. They determined there was no need for acute intervention in the hospital at the time. Please follow-up with a cardiologist for further management of this patent foramen ovale as well as cardiac monitoring after your hospitalization.    Diagnosis: DVT (deep venous thrombosis)  Assessment and Plan of Treatment: It is important that you continue to take your anticoagulation to prevent further thrombosis. Please follow up with your hematologist in order to help figure out what caused this episode. Blood work is still pending.  Vascular surgery recommends compression stockings / keeping your left lower extremity ACE wrapped and elevated. These materials are available over the counter.  For pain, you can take Tylenol (over-the-counter) or if needed for severe pain, oxycodone 10 mg every 4 hours. Please follow up with primary care for refills if needed.  You can also use lidocaine patch, over the counter, to the site of the pain.    Diagnosis: Renal infarct  Assessment and Plan of Treatment: Continue your fondaparinux. If you have worsening abdominal pain or symptoms such as bloody urine, seek emergency medical care.

## 2021-01-03 NOTE — PROGRESS NOTE ADULT - PROBLEM SELECTOR PLAN 1
CT angio w/ bilateral PE. On heparin.  - O2 support as needed  - Pain management as below  - TTE w/ bubble study pending

## 2021-01-03 NOTE — PROGRESS NOTE ADULT - SUBJECTIVE AND OBJECTIVE BOX
Rubi Silveira MD  Internal Medicine PGY-1  Pager -[phone]  Pager [facility] [phone]      Patient is a 25y old  Female who presents with a chief complaint of DVT + PE + renal infarct (2021 08:08)        SUBJECTIVE / OVERNIGHT EVENTS: Patient had no acute events overnight. Patient seen and examined at bedside this morning.     ROS: [ - ] Fever [ - ] Chills [ - ] Nausea/Vomiting [ - ] Chest Pain [ - ] Shortness of breath     MEDICATIONS  (STANDING):  heparin  Infusion.  Unit(s)/Hr (18 mL/Hr) IV Continuous <Continuous>  influenza   Vaccine 0.5 milliLiter(s) IntraMuscular once    MEDICATIONS  (PRN):  acetaminophen   Tablet .. 650 milliGRAM(s) Oral every 6 hours PRN Mild Pain (1 - 3), Moderate Pain (4 - 6)  heparin   Injectable 8000 Unit(s) IV Push every 6 hours PRN For aPTT less than 40  heparin   Injectable 4000 Unit(s) IV Push every 6 hours PRN For aPTT between 40 - 57  morphine  - Injectable 2 milliGRAM(s) IV Push every 4 hours PRN Severe Pain (7 - 10)      Vital Signs Last 24 Hrs  T(C): 36.9 (2021 06:37), Max: 37 (2021 12:27)  T(F): 98.4 (2021 06:37), Max: 98.6 (2021 12:27)  HR: 95 (2021 06:37) (95 - 102)  BP: 120/72 (2021 06:37) (112/72 - 128/77)  BP(mean): --  RR: 18 (2021 06:37) (18 - 21)  SpO2: 100% (2021 06:37) (100% - 100%)  CAPILLARY BLOOD GLUCOSE        I&O's Summary      PHYSICAL EXAM  GENERAL: NAD, lying comfortably in bed  HEENT:  Atraumatic, Normocephalic, EOMI, conjunctiva and sclera clear, no LAD  CHEST/LUNG: Clear to auscultation bilaterally; No wheeze, mildly increased work of breathing  HEART: RRR, S1 and S2 No murmurs, rubs, or gallops  ABDOMEN: Soft, Nontender, Nondistended; Bowel sounds present  EXTREMITIES:  2+ Peripheral Pulses, No clubbing, cyanosis, or edema  NEURO: AAOx3, non-focal  SKIN: No rashes or lesions    LABS:                        9.0    10.23 )-----------( 348      ( 2021 05:27 )             30.2     01-03    136  |  99  |  3<L>  ----------------------------<  68<L>  3.6   |  24  |  0.91    Ca    9.3      2021 05:27  Phos  3.7     -  Mg     2.1     -    TPro  7.2  /  Alb  3.6  /  TBili  0.2  /  DBili  x   /  AST  17  /  ALT  25  /  AlkPhos  47  -    PT/INR - ( 2021 00:17 )   PT: 15.0 sec;   INR: 1.32 ratio         PTT - ( 2021 03:20 )  PTT:74.7 sec      Urinalysis Basic - ( 2021 00:54 )    Color: Yellow / Appearance: Slightly Turbid / S.024 / pH: x  Gluc: x / Ketone: Trace  / Bili: Negative / Urobili: 6 mg/dL   Blood: x / Protein: 30 mg/dL / Nitrite: Negative   Leuk Esterase: Large / RBC: 5-10 /HPF / WBC many /HPF   Sq Epi: x / Non Sq Epi: many /HPF / Bacteria: Few          RADIOLOGY & ADDITIONAL TESTS:    Imaging Personally Reviewed:  Consultant(s) Notes Reviewed:

## 2021-01-03 NOTE — DISCHARGE NOTE PROVIDER - CARE PROVIDER_API CALL
Sahil Covarrubias; MBBS)  Hematology; [facility]; Medical Oncology  [address] [place] [id number]  Phone: [phone]  Fax: [phone]  Follow Up Time:    Sahil Covarrubias (MD; MBBS)  Hematology; [facility]; Medical Oncology  [address], [place] [id number]  Phone: [phone]  Fax: [phone]  Follow Up Time:     Valentina Pham (MD; MS)  Vascular Surgery  [address] [facility], [address]  [place] [address]  Phone: [phone]  Fax: [phone]  Follow Up Time: 2 weeks

## 2021-01-03 NOTE — PROGRESS NOTE ADULT - ASSESSMENT
25F w/ recent tonsillectomy, nonsmoker, using birth control presenting w/ 1 day of SOB, chest pain, back pain, and leg pain w/ no other medical history. In the ED, imaging revealed bilateral DVT w/ high clot burden, bilateral PE, and renal infarct; patient was heparinized. Now pending TTE for evaluation of r to l shunt. 25F w/ recent tonsillectomy, nonsmoker, using birth control presenting w/ 1 day of SOB, chest pain, back pain, and leg pain w/ no other medical history. In the ED, imaging revealed bilateral DVT w/ high clot burden, bilateral PE, and renal infarct; patient was therapeutically anticoagulated with heparin gtt. Now undergoing hypercoagulable workup, is also pending TTE for evaluation of r to l shunt.

## 2021-01-03 NOTE — DISCHARGE NOTE PROVIDER - CARE PROVIDERS DIRECT ADDRESSES
[email or web address]

## 2021-01-03 NOTE — DISCHARGE NOTE PROVIDER - NSFOLLOWUPCLINICS_GEN_ALL_ED_FT
[facility]  Hematology/Oncology  [address] [place] [address]  Phone: [phone]  Fax:     [facility]  Rheumatology  [address] [place] [id number]  Phone: [phone]  Fax:   Follow Up Time:

## 2021-01-03 NOTE — DISCHARGE NOTE PROVIDER - NSDCMRMEDTOKEN_GEN_ALL_CORE_FT
fondaparinux: 7.5 milligram(s) subcutaneous once a day  fondaparinux 7.5 mg/0.6 mL subcutaneous solution: 7.5 milligram(s) subcutaneously once a day    fondaparinux 7.5 mg/0.6 mL subcutaneous solution: 7.5 milligram(s) subcutaneously once a day   oxyCODONE 10 mg oral tablet: 1 tab(s) orally every 4 hours, As Needed -Severe Pain (7 - 10) - for severe pain MDD:60 mg   polyethylene glycol 3350 oral powder for reconstitution: 17 gram(s) orally once a day

## 2021-01-03 NOTE — PROGRESS NOTE ADULT - ATTENDING COMMENTS
Patient seen and examined, d/w Dr. Silveira, agree with above.     24 yo F w/ recent tonsillectomy, on OCPs found to have extensive VTE (B/l common iliac DVT, b/l PE w/ pulmonary infarct and L renal infarct) raising concern for possible R->L shunt (echo pending), currently therapeutically anticoagulated on heparin gtt, hypercoagulable workup ongoing. Patient reports breathing a bit better, no SOB at rest, wean supplemental O2 as tolerated. Depending on results of hypercoagulable workup, will see if patient is a candidate for transition to DOAC (ie if workup reveals anti-phospholipid syndrome then may need to consider coumadin).

## 2021-01-03 NOTE — DISCHARGE NOTE PROVIDER - PROVIDER TOKENS
PROVIDER:[TOKEN:[[id number]]] PROVIDER:[TOKEN:[[id number]]],PROVIDER:[TOKEN:[[id number]],FOLLOWUP:[2 weeks]]

## 2021-01-03 NOTE — PROGRESS NOTE ADULT - PROBLEM SELECTOR PLAN 4
- Tylenol 650mg PRN q6 for mild/moderate pain  - Morphine 2 mg IV push PRN q6 for severe pain  - Revaluate and adjust as needed

## 2021-01-04 LAB
ALBUMIN SERPL ELPH-MCNC: 3.6 G/DL — SIGNIFICANT CHANGE UP (ref 3.3–5)
ALP SERPL-CCNC: 44 U/L — SIGNIFICANT CHANGE UP (ref 40–120)
ALT FLD-CCNC: 27 U/L — SIGNIFICANT CHANGE UP (ref 4–33)
ANION GAP SERPL CALC-SCNC: 13 MMOL/L — SIGNIFICANT CHANGE UP (ref 7–14)
APTT BLD: 51.4 SEC — HIGH (ref 27–36.3)
APTT BLD: 72.5 SEC — HIGH (ref 27–36.3)
AST SERPL-CCNC: 22 U/L — SIGNIFICANT CHANGE UP (ref 4–32)
BASOPHILS # BLD AUTO: 0.03 K/UL — SIGNIFICANT CHANGE UP (ref 0–0.2)
BASOPHILS NFR BLD AUTO: 0.4 % — SIGNIFICANT CHANGE UP (ref 0–2)
BILIRUB SERPL-MCNC: 0.2 MG/DL — SIGNIFICANT CHANGE UP (ref 0.2–1.2)
BUN SERPL-MCNC: 5 MG/DL — LOW (ref 7–23)
CALCIUM SERPL-MCNC: 9.2 MG/DL — SIGNIFICANT CHANGE UP (ref 8.4–10.5)
CHLORIDE SERPL-SCNC: 103 MMOL/L — SIGNIFICANT CHANGE UP (ref 98–107)
CO2 SERPL-SCNC: 24 MMOL/L — SIGNIFICANT CHANGE UP (ref 22–31)
CREAT SERPL-MCNC: 0.84 MG/DL — SIGNIFICANT CHANGE UP (ref 0.5–1.3)
EOSINOPHIL # BLD AUTO: 0.31 K/UL — SIGNIFICANT CHANGE UP (ref 0–0.5)
EOSINOPHIL NFR BLD AUTO: 4 % — SIGNIFICANT CHANGE UP (ref 0–6)
FACT V ACT/NOR PPP: 68.9 % — LOW (ref 75–150)
GLUCOSE SERPL-MCNC: 75 MG/DL — SIGNIFICANT CHANGE UP (ref 70–99)
HCT VFR BLD CALC: 31.1 % — LOW (ref 34.5–45)
HGB BLD-MCNC: 9.2 G/DL — LOW (ref 11.5–15.5)
IANC: 4.33 K/UL — SIGNIFICANT CHANGE UP (ref 1.5–8.5)
IMM GRANULOCYTES NFR BLD AUTO: 0.4 % — SIGNIFICANT CHANGE UP (ref 0–1.5)
LYMPHOCYTES # BLD AUTO: 2.35 K/UL — SIGNIFICANT CHANGE UP (ref 1–3.3)
LYMPHOCYTES # BLD AUTO: 30.3 % — SIGNIFICANT CHANGE UP (ref 13–44)
MAGNESIUM SERPL-MCNC: 2.3 MG/DL — SIGNIFICANT CHANGE UP (ref 1.6–2.6)
MCHC RBC-ENTMCNC: 20.4 PG — LOW (ref 27–34)
MCHC RBC-ENTMCNC: 29.6 GM/DL — LOW (ref 32–36)
MCV RBC AUTO: 69.1 FL — LOW (ref 80–100)
MONOCYTES # BLD AUTO: 0.7 K/UL — SIGNIFICANT CHANGE UP (ref 0–0.9)
MONOCYTES NFR BLD AUTO: 9 % — SIGNIFICANT CHANGE UP (ref 2–14)
NEUTROPHILS # BLD AUTO: 4.33 K/UL — SIGNIFICANT CHANGE UP (ref 1.8–7.4)
NEUTROPHILS NFR BLD AUTO: 55.9 % — SIGNIFICANT CHANGE UP (ref 43–77)
NRBC # BLD: 0 /100 WBCS — SIGNIFICANT CHANGE UP
NRBC # FLD: 0 K/UL — SIGNIFICANT CHANGE UP
PHOSPHATE SERPL-MCNC: 3.6 MG/DL — SIGNIFICANT CHANGE UP (ref 2.5–4.5)
PLATELET # BLD AUTO: 411 K/UL — HIGH (ref 150–400)
POTASSIUM SERPL-MCNC: 4.5 MMOL/L — SIGNIFICANT CHANGE UP (ref 3.5–5.3)
POTASSIUM SERPL-SCNC: 4.5 MMOL/L — SIGNIFICANT CHANGE UP (ref 3.5–5.3)
PROT C ACT/NOR PPP: 70 % — LOW (ref 74–150)
PROT S FREE PPP-ACNC: 26 % — LOW (ref 70–130)
PROT SERPL-MCNC: 7.5 G/DL — SIGNIFICANT CHANGE UP (ref 6–8.3)
RBC # BLD: 4.5 M/UL — SIGNIFICANT CHANGE UP (ref 3.8–5.2)
RBC # FLD: 15.6 % — HIGH (ref 10.3–14.5)
SODIUM SERPL-SCNC: 140 MMOL/L — SIGNIFICANT CHANGE UP (ref 135–145)
WBC # BLD: 7.75 K/UL — SIGNIFICANT CHANGE UP (ref 3.8–10.5)
WBC # FLD AUTO: 7.75 K/UL — SIGNIFICANT CHANGE UP (ref 3.8–10.5)

## 2021-01-04 PROCEDURE — 99233 SBSQ HOSP IP/OBS HIGH 50: CPT | Mod: GC

## 2021-01-04 PROCEDURE — 93306 TTE W/DOPPLER COMPLETE: CPT | Mod: 26

## 2021-01-04 RX ORDER — TRAMADOL HYDROCHLORIDE 50 MG/1
25 TABLET ORAL EVERY 4 HOURS
Refills: 0 | Status: DISCONTINUED | OUTPATIENT
Start: 2021-01-04 | End: 2021-01-06

## 2021-01-04 RX ORDER — ACETAMINOPHEN 500 MG
975 TABLET ORAL EVERY 6 HOURS
Refills: 0 | Status: DISCONTINUED | OUTPATIENT
Start: 2021-01-04 | End: 2021-01-08

## 2021-01-04 RX ADMIN — Medication 650 MILLIGRAM(S): at 05:21

## 2021-01-04 RX ADMIN — TRAMADOL HYDROCHLORIDE 25 MILLIGRAM(S): 50 TABLET ORAL at 15:00

## 2021-01-04 RX ADMIN — HEPARIN SODIUM 1700 UNIT(S)/HR: 5000 INJECTION INTRAVENOUS; SUBCUTANEOUS at 18:08

## 2021-01-04 RX ADMIN — TRAMADOL HYDROCHLORIDE 25 MILLIGRAM(S): 50 TABLET ORAL at 14:15

## 2021-01-04 RX ADMIN — Medication 650 MILLIGRAM(S): at 06:21

## 2021-01-04 RX ADMIN — HEPARIN SODIUM 1700 UNIT(S)/HR: 5000 INJECTION INTRAVENOUS; SUBCUTANEOUS at 09:17

## 2021-01-04 NOTE — PROGRESS NOTE ADULT - PROBLEM SELECTOR PLAN 4
- Tylenol 650mg PRN q6 for mild/moderate pain  - Morphine 2 mg IV push PRN q6 for severe pain  - Revaluate and adjust as needed - Tylenol 650mg PRN q6 for mild/moderate pain  - Morphine 2 discontinued, added tramadol   - Revaluate and adjust as needed

## 2021-01-04 NOTE — PROGRESS NOTE ADULT - PROBLEM SELECTOR PLAN 1
CT angio w/ bilateral PE. On heparin.  - O2 support as needed  - Pain management as below  - TTE w/ bubble study pending CT angio w/ bilateral PE. On heparin.  - O2 support as needed  - Pain management as below  - TTE w/ bubble study pending  - heme consult needed on 1/5

## 2021-01-04 NOTE — PROGRESS NOTE ADULT - ATTENDING COMMENTS
25 y.o. F w/ a hx of recent tonsillectomy, on OCP's hospitalized for extensive VTE (bilateral common iliac DVT, b/l PE w/ pulmonary and renal infarct) currently on Heparin. Patient feels well, no complaints. PE notable for 2L NC use. Labs unremarkable, hypercoagulable labs pending.     # Acute hypoxic respiratory failure 2/2 PE: Wean O2 as able. C/s Hematology. Cont Heparin drip pending results of hypercoagulable labs- will transition to DOAC v Coumadin. Check TTE w/ bubble study to look for R>L shunt.   # Microcytic Anemia: Check iron studies    I have personally seen, examined and participated in the care of this patient. I have reviewed all pertinent clinical information, including history, physical exam, plan and the resident's note and agree except as noted.

## 2021-01-04 NOTE — PROGRESS NOTE ADULT - ASSESSMENT
25F w/ recent tonsillectomy, nonsmoker, using birth control presenting w/ 1 day of SOB, chest pain, back pain, and leg pain w/ no other medical history. In the ED, imaging revealed bilateral DVT w/ high clot burden, bilateral PE, and renal infarct; patient was therapeutically anticoagulated with heparin gtt. Now undergoing hypercoagulable workup, is also pending TTE for evaluation of r to l shunt. 25F w/ recent tonsillectomy, nonsmoker, using birth control presenting w/ 1 day of SOB, chest pain, back pain, and leg pain w/ no other medical history. In the ED, imaging revealed bilateral DVT w/ high clot burden, bilateral PE, and renal infarct; patient was therapeutically anticoagulated with heparin gtt. Now undergoing hypercoagulable workup, is also pending TTE for evaluation of r to l shunt. Heme consult needed on 1/5

## 2021-01-04 NOTE — PROGRESS NOTE ADULT - SUBJECTIVE AND OBJECTIVE BOX
Contact Information:  Serafin Guerin MD,  PGY-1, Internal Medicine  Pager: [phone] ([facility]) ///     ANUJACAROLYN COTTON, MRN-[id number]    Patient is a 25y old  Female who presents with a chief complaint of DVT + PE + renal infarct (03 Jan 2021 12:29)      OVERNIGHT EVENTS:    SUBJECTIVE:        OBJECTIVE:  Vital Signs Last 24 Hrs  T(C): 36.8 (04 Jan 2021 04:00), Max: 37.2 (03 Jan 2021 20:00)  T(F): 98.2 (04 Jan 2021 04:00), Max: 98.9 (03 Jan 2021 20:00)  HR: 90 (04 Jan 2021 04:00) (84 - 91)  BP: 113/70 (04 Jan 2021 04:00) (112/64 - 120/63)  BP(mean): --  RR: 17 (04 Jan 2021 04:00) (17 - 18)  SpO2: 100% (04 Jan 2021 04:00) (100% - 100%)  I&O's Summary      MEDICATIONS  (STANDING):  heparin  Infusion.  Unit(s)/Hr (18 mL/Hr) IV Continuous <Continuous>  influenza   Vaccine 0.5 milliLiter(s) IntraMuscular once    MEDICATIONS  (PRN):  acetaminophen   Tablet .. 650 milliGRAM(s) Oral every 6 hours PRN Mild Pain (1 - 3), Moderate Pain (4 - 6)  heparin   Injectable 8000 Unit(s) IV Push every 6 hours PRN For aPTT less than 40  heparin   Injectable 4000 Unit(s) IV Push every 6 hours PRN For aPTT between 40 - 57  morphine  - Injectable 2 milliGRAM(s) IV Push every 4 hours PRN Severe Pain (7 - 10)    Allergies    No Known Allergies    Intolerances                   31.1     PTT - ( 04 Jan 2021 07:20 )  PTT:51.4 sec  01-04    140  |  103  |  5<L>  ----------------------------<  75  4.5   |  24  |  0.84    Ca    9.2      04 Jan 2021 07:20  Phos  3.6     01-04  Mg     2.3     01-04    TPro  7.5  /  Alb  3.6  /  TBili  0.2  /  DBili  x   /  AST  22  /  ALT  27  /  AlkPhos  44  01-04    CAPILLARY BLOOD GLUCOSE        LIVER FUNCTIONS - ( 04 Jan 2021 07:20 )  Alb: 3.6 g/dL / Pro: 7.5 g/dL / ALK PHOS: 44 U/L / ALT: 27 U/L / AST: 22 U/L / GGT: x                   Culture - Urine (collected 02 Jan 2021 04:03)  Source: .Urine Clean Catch (Midstream)  Final Report (03 Jan 2021 07:47):    <10,000 CFU/mL Normal Urogenital Harriett          RADIOLOGY AND ADDITIONAL TESTS:    CONSULTANT NOTES REVIEWED:    CARE DISCUSSED WITH THE FOLLOWING CONSULTANTS/PROVIDERS: Contact Information:  Serafin Guerin MD,  PGY-1, Internal Medicine  Pager: [phone] ([facility]) ///     CAROLYN LICEA, MRN-[id number]    Patient is a 25y old  Female who presents with a chief complaint of DVT + PE + renal infarct (03 Jan 2021 12:29)      OVERNIGHT EVENTS: No acute events overnight     SUBJECTIVE: Episode of chest pain in afternoon, subsided completely after given tramadol        OBJECTIVE:  Vital Signs Last 24 Hrs  T(C): 36.8 (04 Jan 2021 04:00), Max: 37.2 (03 Jan 2021 20:00)  T(F): 98.2 (04 Jan 2021 04:00), Max: 98.9 (03 Jan 2021 20:00)  HR: 90 (04 Jan 2021 04:00) (84 - 91)  BP: 113/70 (04 Jan 2021 04:00) (112/64 - 120/63)  BP(mean): --  RR: 17 (04 Jan 2021 04:00) (17 - 18)  SpO2: 100% (04 Jan 2021 04:00) (100% - 100%)  I&O's Summary    GENERAL: NAD, lying comfortably in bed  HEENT:  Atraumatic, Normocephalic, EOMI, conjunctiva and sclera clear, no LAD  CHEST/LUNG: Clear to auscultation bilaterally; No wheeze, mildly increased work of breathing  HEART: RRR, S1 and S2 No murmurs, rubs, or gallops  ABDOMEN: Soft, Nontender, Nondistended; Bowel sounds present  EXTREMITIES:  2+ Peripheral Pulses, No clubbing, cyanosis, or edema  NEURO: AAOx3, non-focal  SKIN: No rashes or lesions                MEDICATIONS  (STANDING):  heparin  Infusion.  Unit(s)/Hr (18 mL/Hr) IV Continuous <Continuous>  influenza   Vaccine 0.5 milliLiter(s) IntraMuscular once    MEDICATIONS  (PRN):  acetaminophen   Tablet .. 650 milliGRAM(s) Oral every 6 hours PRN Mild Pain (1 - 3), Moderate Pain (4 - 6)  heparin   Injectable 8000 Unit(s) IV Push every 6 hours PRN For aPTT less than 40  heparin   Injectable 4000 Unit(s) IV Push every 6 hours PRN For aPTT between 40 - 57  morphine  - Injectable 2 milliGRAM(s) IV Push every 4 hours PRN Severe Pain (7 - 10)    Allergies    No Known Allergies    Intolerances                   31.1     PTT - ( 04 Jan 2021 07:20 )  PTT:51.4 sec  01-04    140  |  103  |  5<L>  ----------------------------<  75  4.5   |  24  |  0.84    Ca    9.2      04 Jan 2021 07:20  Phos  3.6     01-04  Mg     2.3     01-04    TPro  7.5  /  Alb  3.6  /  TBili  0.2  /  DBili  x   /  AST  22  /  ALT  27  /  AlkPhos  44  01-04    CAPILLARY BLOOD GLUCOSE        LIVER FUNCTIONS - ( 04 Jan 2021 07:20 )  Alb: 3.6 g/dL / Pro: 7.5 g/dL / ALK PHOS: 44 U/L / ALT: 27 U/L / AST: 22 U/L / GGT: x                   Culture - Urine (collected 02 Jan 2021 04:03)  Source: .Urine Clean Catch (Midstream)  Final Report (03 Jan 2021 07:47):    <10,000 CFU/mL Normal Urogenital Harriett          RADIOLOGY AND ADDITIONAL TESTS:    CONSULTANT NOTES REVIEWED:    CARE DISCUSSED WITH THE FOLLOWING CONSULTANTS/PROVIDERS:

## 2021-01-05 LAB
ANION GAP SERPL CALC-SCNC: 11 MMOL/L — SIGNIFICANT CHANGE UP (ref 7–14)
APTT BLD: 77.7 SEC — HIGH (ref 27–36.3)
B2 GLYCOPROT1 AB SER QL: NEGATIVE — SIGNIFICANT CHANGE UP
BUN SERPL-MCNC: 7 MG/DL — SIGNIFICANT CHANGE UP (ref 7–23)
CALCIUM SERPL-MCNC: 9.3 MG/DL — SIGNIFICANT CHANGE UP (ref 8.4–10.5)
CARDIOLIPIN AB SER-ACNC: POSITIVE
CHLORIDE SERPL-SCNC: 102 MMOL/L — SIGNIFICANT CHANGE UP (ref 98–107)
CO2 SERPL-SCNC: 24 MMOL/L — SIGNIFICANT CHANGE UP (ref 22–31)
CREAT SERPL-MCNC: 0.94 MG/DL — SIGNIFICANT CHANGE UP (ref 0.5–1.3)
DSDNA AB SER-ACNC: <12 IU/ML — SIGNIFICANT CHANGE UP
FERRITIN SERPL-MCNC: 233 NG/ML — HIGH (ref 15–150)
GLUCOSE SERPL-MCNC: 89 MG/DL — SIGNIFICANT CHANGE UP (ref 70–99)
HCT VFR BLD CALC: 32.1 % — LOW (ref 34.5–45)
HGB BLD-MCNC: 9.3 G/DL — LOW (ref 11.5–15.5)
IRON SATN MFR SERPL: 13 % — LOW (ref 14–50)
IRON SATN MFR SERPL: 29 UG/DL — LOW (ref 30–160)
LUPUS ANTICOAGULANT PROFILE RESULT: SIGNIFICANT CHANGE UP
MAGNESIUM SERPL-MCNC: 2.2 MG/DL — SIGNIFICANT CHANGE UP (ref 1.6–2.6)
MCHC RBC-ENTMCNC: 20.4 PG — LOW (ref 27–34)
MCHC RBC-ENTMCNC: 29 GM/DL — LOW (ref 32–36)
MCV RBC AUTO: 70.2 FL — LOW (ref 80–100)
NRBC # BLD: 0 /100 WBCS — SIGNIFICANT CHANGE UP
NRBC # FLD: 0 K/UL — SIGNIFICANT CHANGE UP
PHOSPHATE SERPL-MCNC: 3.9 MG/DL — SIGNIFICANT CHANGE UP (ref 2.5–4.5)
PLATELET # BLD AUTO: 466 K/UL — HIGH (ref 150–400)
POTASSIUM SERPL-MCNC: 4 MMOL/L — SIGNIFICANT CHANGE UP (ref 3.5–5.3)
POTASSIUM SERPL-SCNC: 4 MMOL/L — SIGNIFICANT CHANGE UP (ref 3.5–5.3)
RBC # BLD: 4.57 M/UL — SIGNIFICANT CHANGE UP (ref 3.8–5.2)
RBC # FLD: 15.6 % — HIGH (ref 10.3–14.5)
SODIUM SERPL-SCNC: 137 MMOL/L — SIGNIFICANT CHANGE UP (ref 135–145)
TIBC SERPL-MCNC: 223 UG/DL — SIGNIFICANT CHANGE UP (ref 220–430)
UIBC SERPL-MCNC: 194 UG/DL — SIGNIFICANT CHANGE UP (ref 110–370)
WBC # BLD: 8.07 K/UL — SIGNIFICANT CHANGE UP (ref 3.8–10.5)
WBC # FLD AUTO: 8.07 K/UL — SIGNIFICANT CHANGE UP (ref 3.8–10.5)

## 2021-01-05 PROCEDURE — 99233 SBSQ HOSP IP/OBS HIGH 50: CPT | Mod: GC

## 2021-01-05 PROCEDURE — 99223 1ST HOSP IP/OBS HIGH 75: CPT | Mod: GC

## 2021-01-05 RX ADMIN — HEPARIN SODIUM 1700 UNIT(S)/HR: 5000 INJECTION INTRAVENOUS; SUBCUTANEOUS at 02:37

## 2021-01-05 RX ADMIN — TRAMADOL HYDROCHLORIDE 25 MILLIGRAM(S): 50 TABLET ORAL at 01:44

## 2021-01-05 RX ADMIN — TRAMADOL HYDROCHLORIDE 25 MILLIGRAM(S): 50 TABLET ORAL at 02:30

## 2021-01-05 RX ADMIN — TRAMADOL HYDROCHLORIDE 25 MILLIGRAM(S): 50 TABLET ORAL at 22:54

## 2021-01-05 RX ADMIN — Medication 975 MILLIGRAM(S): at 16:46

## 2021-01-05 RX ADMIN — Medication 975 MILLIGRAM(S): at 17:32

## 2021-01-05 RX ADMIN — TRAMADOL HYDROCHLORIDE 25 MILLIGRAM(S): 50 TABLET ORAL at 23:25

## 2021-01-05 NOTE — PROGRESS NOTE ADULT - SUBJECTIVE AND OBJECTIVE BOX
Contact Information:  Serafin Guerin MD,  PGY-1, Internal Medicine  Pager: [phone] ([facility]) ///     CAROLYN LICEA, [id number]    Patient is a 25y old  Female who presents with a chief complaint of DVT + PE + renal infarct (04 Jan 2021 08:56)      OVERNIGHT EVENTS: Had mild nose bleed, heparin was turned off for 1hr. Heparin restarted    SUBJECTIVE:        OBJECTIVE:  Vital Signs Last 24 Hrs  T(C): 36.7 (05 Jan 2021 05:30), Max: 36.9 (05 Jan 2021 01:28)  T(F): 98.1 (05 Jan 2021 05:30), Max: 98.5 (05 Jan 2021 01:28)  HR: 86 (05 Jan 2021 05:30) (78 - 94)  BP: 102/64 (05 Jan 2021 05:30) (102/64 - 120/78)  BP(mean): --  RR: 17 (05 Jan 2021 05:30) (17 - 18)  SpO2: 100% (05 Jan 2021 05:30) (95% - 100%)  I&O's Summary      MEDICATIONS  (STANDING):  heparin  Infusion.  Unit(s)/Hr (18 mL/Hr) IV Continuous <Continuous>  influenza   Vaccine 0.5 milliLiter(s) IntraMuscular once    MEDICATIONS  (PRN):  acetaminophen   Tablet .. 975 milliGRAM(s) Oral every 6 hours PRN Mild Pain (1 - 3), Moderate Pain (4 - 6)  heparin   Injectable 8000 Unit(s) IV Push every 6 hours PRN For aPTT less than 40  heparin   Injectable 4000 Unit(s) IV Push every 6 hours PRN For aPTT between 40 - 57  traMADol 25 milliGRAM(s) Oral every 4 hours PRN Severe Pain (7 - 10)    Allergies    No Known Allergies    Intolerances                                  9.2    7.75  )-----------( 411      ( 04 Jan 2021 07:20 )             31.1     PTT - ( 05 Jan 2021 01:36 )  PTT:77.7 sec  01-04    140  |  103  |  5<L>  ----------------------------<  75  4.5   |  24  |  0.84    Ca    9.2      04 Jan 2021 07:20  Phos  3.6     01-04  Mg     2.3     01-04    TPro  7.5  /  Alb  3.6  /  TBili  0.2  /  DBili  x   /  AST  22  /  ALT  27  /  AlkPhos  44  01-04    CAPILLARY BLOOD GLUCOSE        LIVER FUNCTIONS - ( 04 Jan 2021 07:20 )  Alb: 3.6 g/dL / Pro: 7.5 g/dL / ALK PHOS: 44 U/L / ALT: 27 U/L / AST: 22 U/L / GGT: x                       RADIOLOGY AND ADDITIONAL TESTS:    CONSULTANT NOTES REVIEWED:    CARE DISCUSSED WITH THE FOLLOWING CONSULTANTS/PROVIDERS: Contact Information:  Serafin Guerin MD,  PGY-1, Internal Medicine  Pager: [phone] ([facility]) ///     CAROLYN LICEA, [id number]    Patient is a 25y old  Female who presents with a chief complaint of DVT + PE + renal infarct (04 Jan 2021 08:56)      OVERNIGHT EVENTS: Had mild nose bleed, heparin was turned off for 1hr. Bleed stopped, heparin restarted    SUBJECTIVE: Patient seen and examined at bedside. Pt has complaints of generalized pain in left leg        OBJECTIVE:  Vital Signs Last 24 Hrs  T(C): 36.7 (05 Jan 2021 05:30), Max: 36.9 (05 Jan 2021 01:28)  T(F): 98.1 (05 Jan 2021 05:30), Max: 98.5 (05 Jan 2021 01:28)  HR: 86 (05 Jan 2021 05:30) (78 - 94)  BP: 102/64 (05 Jan 2021 05:30) (102/64 - 120/78)  BP(mean): --  RR: 17 (05 Jan 2021 05:30) (17 - 18)  SpO2: 100% (05 Jan 2021 05:30) (95% - 100%)  I&O's Summary    GENERAL: NAD,  HEENT:  Atraumatic, Normocephalic, EOMI, conjunctiva and sclera clear, no LAD  CHEST/LUNG: Clear to auscultation bilaterally; No wheeze, mildly increased work of breathing  HEART: RRR, S1 and S2 No murmurs, rubs, or gallops  ABDOMEN: Soft, Nontender, Nondistended; Bowel sounds present  EXTREMITIES:  2+ Peripheral Pulses, No clubbing, cyanosis, or edema  NEURO: AAOx3, non-focal  SKIN: No rashes or lesions        MEDICATIONS  (STANDING):  heparin  Infusion.  Unit(s)/Hr (18 mL/Hr) IV Continuous <Continuous>  influenza   Vaccine 0.5 milliLiter(s) IntraMuscular once    MEDICATIONS  (PRN):  acetaminophen   Tablet .. 975 milliGRAM(s) Oral every 6 hours PRN Mild Pain (1 - 3), Moderate Pain (4 - 6)  heparin   Injectable 8000 Unit(s) IV Push every 6 hours PRN For aPTT less than 40  heparin   Injectable 4000 Unit(s) IV Push every 6 hours PRN For aPTT between 40 - 57  traMADol 25 milliGRAM(s) Oral every 4 hours PRN Severe Pain (7 - 10)    Allergies    No Known Allergies    Intolerances                                  9.2    7.75  )-----------( 411      ( 04 Jan 2021 07:20 )             31.1     PTT - ( 05 Jan 2021 01:36 )  PTT:77.7 sec  01-04    140  |  103  |  5<L>  ----------------------------<  75  4.5   |  24  |  0.84    Ca    9.2      04 Jan 2021 07:20  Phos  3.6     01-04  Mg     2.3     01-04    TPro  7.5  /  Alb  3.6  /  TBili  0.2  /  DBili  x   /  AST  22  /  ALT  27  /  AlkPhos  44  01-04    CAPILLARY BLOOD GLUCOSE        LIVER FUNCTIONS - ( 04 Jan 2021 07:20 )  Alb: 3.6 g/dL / Pro: 7.5 g/dL / ALK PHOS: 44 U/L / ALT: 27 U/L / AST: 22 U/L / GGT: x                       RADIOLOGY AND ADDITIONAL TESTS:    CONSULTANT NOTES REVIEWED:    CARE DISCUSSED WITH THE FOLLOWING CONSULTANTS/PROVIDERS:

## 2021-01-05 NOTE — CONSULT NOTE ADULT - SUBJECTIVE AND OBJECTIVE BOX
HPI:  25F with no previous medical history other than tonsilectomy 2 weeks ago presenting with 1 day of back, lower left leg, chest, and arm pain. Per patient, she was recovering from her tonsilectomy when she developed severe lower back and leg pain radiating down her leg. She then developed chest pain and difficulty catching her breath. Her chest pain is pleuritic and improves upon sitting. She denies headache, dizziness, confusion, other neuro symptoms, palpitations, n/v, recent trauma, dysuria. She has no family history of clotting disorders, is a nonsmoker, and uses oral contraceptives for birth control.    In the ED, CT scan revealed bilateral common iliac DVT extending into internal illiac, bilateral PE, and renal infarct. EKG was unremarkable. Patient was given IV morphine for pain control, full coagulation workup was sent, and patient was admitted to medicine. Patient given heparin for anticoagulation. (02 Jan 2021 08:08)      PAST MEDICAL & SURGICAL HISTORY:  No pertinent past medical history    S/P tonsillectomy        Allergies    No Known Allergies    Intolerances        MEDICATIONS  (STANDING):  heparin  Infusion.  Unit(s)/Hr (18 mL/Hr) IV Continuous <Continuous>  influenza   Vaccine 0.5 milliLiter(s) IntraMuscular once    MEDICATIONS  (PRN):  acetaminophen   Tablet .. 975 milliGRAM(s) Oral every 6 hours PRN Mild Pain (1 - 3), Moderate Pain (4 - 6)  heparin   Injectable 8000 Unit(s) IV Push every 6 hours PRN For aPTT less than 40  heparin   Injectable 4000 Unit(s) IV Push every 6 hours PRN For aPTT between 40 - 57  traMADol 25 milliGRAM(s) Oral every 4 hours PRN Severe Pain (7 - 10)      FAMILY HISTORY:  No pertinent family history in first degree relatives        SOCIAL HISTORY: No EtOH, no tobacco    REVIEW OF SYSTEMS:    CONSTITUTIONAL: No weakness, fevers or chills  EYES/ENT: No visual changes;  No vertigo or throat pain   NECK: No pain or stiffness  RESPIRATORY: No cough, wheezing, hemoptysis; No shortness of breath  CARDIOVASCULAR: No chest pain or palpitations  GASTROINTESTINAL: No abdominal or epigastric pain. No nausea, vomiting, or hematemesis; No diarrhea or constipation. No melena or hematochezia.  GENITOURINARY: No dysuria, frequency or hematuria  NEUROLOGICAL: No numbness or weakness  SKIN: No itching, burning, rashes, or lesions   All other review of systems is negative unless indicated above.    Height (cm): 170.2 (01-05 @ 05:30)    T(F): 98.1 (01-05-21 @ 05:30), Max: 98.5 (01-05-21 @ 01:28)  HR: 86 (01-05-21 @ 05:30)  BP: 102/64 (01-05-21 @ 05:30)  RR: 17 (01-05-21 @ 05:30)  SpO2: 100% (01-05-21 @ 05:30)  Wt(kg): --    GENERAL: NAD, well-developed  HEAD:  Atraumatic, Normocephalic  EYES: EOMI, PERRLA, conjunctiva and sclera clear  NECK: Supple, No JVD  CHEST/LUNG: Clear to auscultation bilaterally; No wheeze  HEART: Regular rate and rhythm; No murmurs, rubs, or gallops  ABDOMEN: Soft, Nontender, Nondistended; Bowel sounds present  EXTREMITIES:  2+ Peripheral Pulses, No clubbing, cyanosis, or edema  NEUROLOGY: non-focal  SKIN: No rashes or lesions                          9.3    8.07  )-----------( 466      ( 05 Jan 2021 07:57 )             32.1       01-05    137  |  102  |  7   ----------------------------<  89  4.0   |  24  |  0.94    Ca    9.3      05 Jan 2021 07:57  Phos  3.9     01-05  Mg     2.2     01-05    TPro  7.5  /  Alb  3.6  /  TBili  0.2  /  DBili  x   /  AST  22  /  ALT  27  /  AlkPhos  44  01-04      Magnesium, Serum: 2.2 mg/dL (01-05 @ 07:57)  Phosphorus Level, Serum: 3.9 mg/dL (01-05 @ 07:57)      PTT - ( 05 Jan 2021 01:36 )  PTT:77.7 sec    .Urine Clean Catch (Midstream)  01-02 @ 04:03   <10,000 CFU/mL Normal Urogenital Harriett  --  --

## 2021-01-05 NOTE — PROGRESS NOTE ADULT - ATTENDING COMMENTS
25 y.o. F w/ a hx of recent tonsillectomy, on OCP's hospitalized for extensive VTE (bilateral common iliac DVT, b/l PE w/ pulmonary and renal infarct) currently on Heparin. Patient feels well, has some discomfort in her thighs. PE notable for 2L O2 use. Labs notable for low protein C & S.     # Acute hypoxic respiratory failure 2/2 PE: Wean O2 as able. C/s Hematology. Cont Heparin drip pending results of hypercoagulable labs- will transition to DOAC v Coumadin. TTE demonstrating possible R>L shunt, will c/s Cardiology for REJI.     I have personally seen, examined and participated in the care of this patient. I have reviewed all pertinent clinical information, including history, physical exam, plan and the resident's note and agree except as noted.

## 2021-01-05 NOTE — CONSULT NOTE ADULT - ASSESSMENT
25F with no previous medical history other than tonsilectomy 2 weeks ago presenting with 1 day of back, lower left leg, chest, and arm pain found to have b/l lobar PE, common iliac DVT extending into internal iliac vein, and left lower pole renal infarct.     #B/L Lobar PE  #Common/Internal Iliac Vein DVT   #Left Lower Pole Renal Infarct   -presented with inspiratory chest pain, shortness of breath, back, arm and leg pain found to have b/l lobar PE and common iliac vein DVT extending into internal iliac vein and left renal kaya infarct  -some of the hypercoaguable work up sent  -Protein C and S low in the setting of acute clot   -ATIII normal (can also be low whil on heparin)   -unlikely [facility] Leiden however can complete hypercoaguable work up: LAC, [id number] ab, antiphospholipid ab, prothrombin gene mutation, FV Leiden   -c/w heparin   -avoid OCP use in the future   -TTE with bubble study showing bubbles in LV, possible PFO, consider REJI       #Microcytic Anemia   -hemoglobin 9.3, MCV 70  -unknown baseline  -send iron studies (total iron, TIBC, ferritin, transferrin)   -likely SELENE   -will follow up work up, continue to trend     Adam Rivera MD  Hematology/Oncology Fellow   pager [phone]   After 5pm and on weekends page on call fellow  25F with no previous medical history other than tonsilectomy 2 weeks ago presenting with 1 day of back, lower left leg, chest, and arm pain found to have b/l lobar PE, common iliac DVT extending into internal iliac vein, and left lower pole renal infarct.     #B/L Lobar PE  #Common/Internal Iliac Vein DVT   #Left Lower Pole Renal Infarct   -presented with inspiratory chest pain, shortness of breath, back, arm and leg pain found to have b/l lobar PE and common iliac vein DVT extending into internal iliac vein and left renal kaya infarct  -likely in the setting of obesity, OCP use, and immobilization after surgery along with possible PFO  -some of the hypercoaguable work up sent  -Protein C and S low in the setting of acute clot   -ATIII normal (can also be low whil on heparin)   -unlikely [facility] Le[facility] however can complete hypercoaguable work up: LAC, [id number] ab, antiphospholipid ab, prothrombin gene mutation, FV Leiden   -c/w heparin   -avoid OCP use in the future   -TTE with bubble study showing bubbles in LV, possible PFO, consider REJI and cardiology f/u      #Microcytic Anemia   -hemoglobin 9.3, MCV 70  -unknown baseline  -send iron studies (total iron, TIBC, ferritin, transferrin)   -likely SELENE   -will follow up work up, continue to trend     Adam Rivera MD  Hematology/Oncology Fellow   pager [phone]   After 5pm and on weekends page on call fellow  25F with no previous medical history other than tonsilectomy 2 weeks ago presenting with 1 day of back, lower left leg, chest, and arm pain found to have b/l lobar PE, common iliac DVT extending into internal iliac vein, and left lower pole renal infarct.     #B/L Lobar PE  #Common/Internal Iliac Vein DVT   #Left Lower Pole Renal Infarct   -presented with inspiratory chest pain, shortness of breath, back, arm and leg pain found to have b/l lobar PE and common iliac vein DVT extending into internal iliac vein and left renal kaya infarct  -likely in the setting of obesity, OCP use, and immobilization after surgery along with possible PFO  -some of the hypercoaguable work up sent  -Protein C and S low in the setting of acute clot   -ATIII normal (can also be low whil on heparin)   -unlikely [facility] Le[facility] however can complete hypercoaguable work up: LAC, [id number] ab, antiphospholipid ab, prothrombin gene mutation, F[facility] Leiden   -c/w heparin, if APLS labs are negative then can be discharged on a DOAC, will need 6 months of treatment   -avoid OCP use in the future   -TTE with bubble study showing bubbles in LV, possible PFO, consider REJI and cardiology f/u  -can follow up at [facility] after discharge      #Microcytic Anemia   -hemoglobin 9.3, MCV 70  -unknown baseline  -send iron studies (total iron, TIBC, ferritin, transferrin)   -likely SELENE   -will follow up work up, continue to trend     Adam Rivera MD  Hematology/Oncology Fellow   pager [phone]   After 5pm and on weekends page on call fellow  25F with no previous medical history other than tonsilectomy 2 weeks ago presenting with 1 day of back, lower left leg, chest, and arm pain found to have b/l lobar PE, common iliac DVT extending into internal iliac vein, and left lower pole renal infarct.     #B/L Lobar PE  #Common/Internal Iliac Vein DVT   #Left Lower Pole Renal Infarct   -presented with inspiratory chest pain, shortness of breath, back, arm and leg pain found to have b/l lobar PE and common iliac vein DVT extending into internal iliac vein and left renal kaya infarct  -likely in the setting of obesity, OCP use, and immobilization after surgery along with possible PFO  -some of the hypercoaguable work up sent  -Protein C and S low in the setting of acute clot   -ATIII normal (can also be low whil on heparin)   -unlikely [facility] Leiden however can complete hypercoaguable work up: LAC, [id number] ab, antiphospholipid ab, prothrombin gene mutation, FV Leiden   -c/w heparin, if APLS labs are negative then can be discharged on a DOAC, will need 6 months of treatment   -avoid estrogen containing OCP use in the future   -TTE with bubble study showing bubbles in LV, possible PFO, consider REJI and cardiology f/u  -can follow up at [facility] after discharge      #Microcytic Anemia   -hemoglobin 9.3, MCV 70  -unknown baseline  -send iron studies (total iron, TIBC, ferritin, transferrin)   -likely SELENE   -will follow up work up, continue to trend     Adam Rivera MD  Hematology/Oncology Fellow   pager [phone]   After 5pm and on weekends page on call fellow

## 2021-01-05 NOTE — PROGRESS NOTE ADULT - PROBLEM SELECTOR PLAN 1
CT angio w/ bilateral PE. On heparin.  - O2 support as needed  - Pain management as below  - TTE w/ bubble study pending  - heme consult needed on 1/5 CT angio w/ bilateral PE. On heparin.  - O2 support as needed  - Pain management as below  - TTE showing possible R to L shunt, REJI ordered   - heme consulted for coagulopathy and anemia

## 2021-01-05 NOTE — PROGRESS NOTE ADULT - PROBLEM SELECTOR PLAN 4
- Tylenol 650mg PRN q6 for mild/moderate pain  - Morphine 2 discontinued, added tramadol   - Revaluate and adjust as needed

## 2021-01-06 DIAGNOSIS — J35.1 HYPERTROPHY OF TONSILS: ICD-10-CM

## 2021-01-06 LAB
ANION GAP SERPL CALC-SCNC: 12 MMOL/L — SIGNIFICANT CHANGE UP (ref 7–14)
APTT BLD: 98.7 SEC — HIGH (ref 27–36.3)
B2 GLYCOPROT1 AB SER QL: NEGATIVE — SIGNIFICANT CHANGE UP
BUN SERPL-MCNC: 8 MG/DL — SIGNIFICANT CHANGE UP (ref 7–23)
CALCIUM SERPL-MCNC: 9.6 MG/DL — SIGNIFICANT CHANGE UP (ref 8.4–10.5)
CARDIOLIPIN AB SER-ACNC: POSITIVE
CHLORIDE SERPL-SCNC: 101 MMOL/L — SIGNIFICANT CHANGE UP (ref 98–107)
CO2 SERPL-SCNC: 25 MMOL/L — SIGNIFICANT CHANGE UP (ref 22–31)
CREAT SERPL-MCNC: 1.04 MG/DL — SIGNIFICANT CHANGE UP (ref 0.5–1.3)
GLUCOSE SERPL-MCNC: 76 MG/DL — SIGNIFICANT CHANGE UP (ref 70–99)
HCG UR QL: NEGATIVE — SIGNIFICANT CHANGE UP
HEMOGLOBIN INTERPRETATION: SIGNIFICANT CHANGE UP
HGB A MFR BLD: 97.1 % — SIGNIFICANT CHANGE UP
HGB A2 MFR BLD: 2.6 % — SIGNIFICANT CHANGE UP (ref 2.4–3.5)
HGB F MFR BLD: <1 % — SIGNIFICANT CHANGE UP (ref 0–1.5)
MAGNESIUM SERPL-MCNC: 2.3 MG/DL — SIGNIFICANT CHANGE UP (ref 1.6–2.6)
PHOSPHATE SERPL-MCNC: 4.6 MG/DL — HIGH (ref 2.5–4.5)
POTASSIUM SERPL-MCNC: 4 MMOL/L — SIGNIFICANT CHANGE UP (ref 3.5–5.3)
POTASSIUM SERPL-SCNC: 4 MMOL/L — SIGNIFICANT CHANGE UP (ref 3.5–5.3)
SODIUM SERPL-SCNC: 138 MMOL/L — SIGNIFICANT CHANGE UP (ref 135–145)

## 2021-01-06 PROCEDURE — 99231 SBSQ HOSP IP/OBS SF/LOW 25: CPT

## 2021-01-06 PROCEDURE — 93325 DOPPLER ECHO COLOR FLOW MAPG: CPT | Mod: 26,GC

## 2021-01-06 PROCEDURE — 93320 DOPPLER ECHO COMPLETE: CPT | Mod: 26,GC

## 2021-01-06 PROCEDURE — 93312 ECHO TRANSESOPHAGEAL: CPT | Mod: 26

## 2021-01-06 PROCEDURE — 83020 HEMOGLOBIN ELECTROPHORESIS: CPT | Mod: 26

## 2021-01-06 PROCEDURE — 99233 SBSQ HOSP IP/OBS HIGH 50: CPT | Mod: GC

## 2021-01-06 RX ORDER — MORPHINE SULFATE 50 MG/1
2 CAPSULE, EXTENDED RELEASE ORAL ONCE
Refills: 0 | Status: DISCONTINUED | OUTPATIENT
Start: 2021-01-06 | End: 2021-01-06

## 2021-01-06 RX ORDER — OXYCODONE AND ACETAMINOPHEN 5; 325 MG/1; MG/1
2 TABLET ORAL EVERY 6 HOURS
Refills: 0 | Status: DISCONTINUED | OUTPATIENT
Start: 2021-01-06 | End: 2021-01-08

## 2021-01-06 RX ADMIN — Medication 975 MILLIGRAM(S): at 23:43

## 2021-01-06 RX ADMIN — TRAMADOL HYDROCHLORIDE 25 MILLIGRAM(S): 50 TABLET ORAL at 13:44

## 2021-01-06 RX ADMIN — TRAMADOL HYDROCHLORIDE 25 MILLIGRAM(S): 50 TABLET ORAL at 08:02

## 2021-01-06 RX ADMIN — HEPARIN SODIUM 1700 UNIT(S)/HR: 5000 INJECTION INTRAVENOUS; SUBCUTANEOUS at 09:21

## 2021-01-06 RX ADMIN — OXYCODONE AND ACETAMINOPHEN 2 TABLET(S): 5; 325 TABLET ORAL at 18:28

## 2021-01-06 RX ADMIN — Medication 975 MILLIGRAM(S): at 01:01

## 2021-01-06 RX ADMIN — MORPHINE SULFATE 2 MILLIGRAM(S): 50 CAPSULE, EXTENDED RELEASE ORAL at 11:28

## 2021-01-06 NOTE — CONSULT NOTE ADULT - SUBJECTIVE AND OBJECTIVE BOX
CC: clearance for REJI    HPI: 25 year old female admitted with + DVT, PE and renal infarct. Patient is S/P tonsillectomy 3 weeks ago with an outside ENT. Called by medicine service to clear patient because of the tonsillectomy for bleeding risk. Patient denies any bleeding at this time. tolerating diet.       PAST MEDICAL & SURGICAL HISTORY:  No pertinent past medical history    S/P tonsillectomy      Allergies    No Known Allergies    Intolerances      MEDICATIONS  (STANDING):  heparin  Infusion.  Unit(s)/Hr (18 mL/Hr) IV Continuous <Continuous>  influenza   Vaccine 0.5 milliLiter(s) IntraMuscular once    MEDICATIONS  (PRN):  acetaminophen   Tablet .. 975 milliGRAM(s) Oral every 6 hours PRN Mild Pain (1 - 3), Moderate Pain (4 - 6)  heparin   Injectable 8000 Unit(s) IV Push every 6 hours PRN For aPTT less than 40  heparin   Injectable 4000 Unit(s) IV Push every 6 hours PRN For aPTT between 40 - 57  traMADol 25 milliGRAM(s) Oral every 4 hours PRN Severe Pain (7 - 10)      ROS:   ENT: all negative except as noted in HPI   CV: denies palpitations  Pulm: denies SOB, cough, hemoptysis  GI: denies change in apetite, indigestion, n/v  : denies pertinent urinary symptoms, urgency  Neuro: denies numbness/tingling, loss of sensation  Psych: denies anxiety  MS: denies muscle weakness, instability  Heme: denies easy bruising or bleeding  Endo: denies heat/cold intolerance, excessive sweating  Vascular: denies LE edema    Vital Signs Last 24 Hrs  T(C): 37.1 (06 Jan 2021 11:28), Max: 37.1 (06 Jan 2021 11:28)  T(F): 98.8 (06 Jan 2021 11:28), Max: 98.8 (06 Jan 2021 11:28)  HR: 102 (06 Jan 2021 11:28) (80 - 102)  BP: 126/78 (06 Jan 2021 11:28) (106/67 - 126/78)  BP(mean): --  RR: 18 (06 Jan 2021 11:28) (17 - 18)  SpO2: 100% (06 Jan 2021 11:28) (99% - 100%)                          9.3    8.07  )-----------( 466      ( 05 Jan 2021 07:57 )             32.1    01-06    138  |  101  |  8   ----------------------------<  76  4.0   |  25  |  1.04    Ca    9.6      06 Jan 2021 07:43  Phos  4.6     01-06  Mg     2.3     01-06     PT/INR - ( 05 Jan 2021 15:03 )   PT: 15.1 sec;   INR: 1.33 ratio         PTT - ( 06 Jan 2021 07:43 )  PTT:98.7 sec    PHYSICAL EXAM:  Gen: NAD  Skin: No rashes, bruises, or lesions  Head: Normocephalic, Atraumatic  Face: no edema, erythema, or fluctuance. Parotid glands soft without mass  Eyes: no scleral injection  Ears: Right - ear canal clear, TM intact without effusion or erythema. No evidence of any fluid drainage. No mastoid tenderness, erythema, or ear bulging            Left - ear canal clear, TM intact without effusion or erythema. No evidence of any fluid drainage. No mastoid tenderness, erythema, or ear bulging  Nose: Nares bilaterally patent, no discharge  Mouth: No Stridor / Drooling / Trismus.  Mucosa moist, tongue/uvula midline, oropharynx clear, tonsil beds well healed, no bleeding sites appreciated.   Neck: Flat, supple, no lymphadenopathy, trachea midline, no masses  Lymphatic: No lymphadenopathy  Resp: breathing easily, no stridor  CV: no peripheral edema/cyanosis  GI: nondistended   Peripheral vascular: no JVD or edema  Neuro: facial nerve intact, no facial droop      Diagnostic Nasal Endoscopy: (Scope #2 used)    Fiberoptic Indirect laryngoscopy:  (Scope #2 used)    IMAGING/ADDITIONAL STUDIES:

## 2021-01-06 NOTE — PROGRESS NOTE ADULT - ASSESSMENT
25F w/ recent tonsillectomy, nonsmoker, using birth control presenting w/ 1 day of SOB, chest pain, back pain, and leg pain w/ no other medical history. In the ED, imaging revealed bilateral DVT w/ high clot burden, bilateral PE, and renal infarct; patient was therapeutically anticoagulated with heparin gtt. Now undergoing hypercoagulable workup. TTE showing possible R to L shunt, REJI ordered. Heme consulted for coagulopathy and anemia

## 2021-01-06 NOTE — PROGRESS NOTE ADULT - SUBJECTIVE AND OBJECTIVE BOX
Contact Information:  Serafin Guerin MD,  PGY-1, Internal Medicine  Pager: [phone] ([facility]) ///     CAROLYN LICEA, [id number]    Patient is a 25y old  Female who presents with a chief complaint of DVT + PE + renal infarct (05 Jan 2021 11:37)      OVERNIGHT EVENTS: No acute events overnight.     SUBJECTIVE:        OBJECTIVE:  Vital Signs Last 24 Hrs  T(C): 36.7 (06 Jan 2021 05:16), Max: 37.1 (05 Jan 2021 13:47)  T(F): 98.1 (06 Jan 2021 05:16), Max: 98.7 (05 Jan 2021 13:47)  HR: 80 (06 Jan 2021 05:16) (80 - 92)  BP: 108/70 (06 Jan 2021 05:16) (108/70 - 120/73)  BP(mean): --  RR: 18 (06 Jan 2021 05:16) (18 - 18)  SpO2: 100% (06 Jan 2021 05:16) (99% - 100%)  I&O's Summary    05 Jan 2021 07:01  -  06 Jan 2021 07:00  --------------------------------------------------------  IN: 204 mL / OUT: 0 mL / NET: 204 mL        MEDICATIONS  (STANDING):  heparin  Infusion.  Unit(s)/Hr (18 mL/Hr) IV Continuous <Continuous>  influenza   Vaccine 0.5 milliLiter(s) IntraMuscular once    MEDICATIONS  (PRN):  acetaminophen   Tablet .. 975 milliGRAM(s) Oral every 6 hours PRN Mild Pain (1 - 3), Moderate Pain (4 - 6)  heparin   Injectable 8000 Unit(s) IV Push every 6 hours PRN For aPTT less than 40  heparin   Injectable 4000 Unit(s) IV Push every 6 hours PRN For aPTT between 40 - 57  traMADol 25 milliGRAM(s) Oral every 4 hours PRN Severe Pain (7 - 10)    Allergies    No Known Allergies    Intolerances        CONSTITUTIONAL: No acute distress. Awake and alert.  HEAD: No evidence of trauma. Structures WNL.  EYES: +PERRL. +EOMI. No scleral icterus. No conjunctival injection.  ENT: Moist oral mucosa. No erythema. No pharyngeal exudates.   NECK: Supple. Appropriate ROM. No stiffness. No masses or lymphadenopathy.  RESPIRATORY: CTAB. No wheezes, rales, or rhonchi. No accessory muscle use. No apparent respiratory distress.  CARDIOVASCULAR: +S1/S2. No audible S3/S4. Regular rate and rhythm. No murmurs, rubs, or gallops. 2+ radial pulses x b/l UE; 2+ DP pulses x b/l LE.   GASTROINTESTINAL: Soft, nontender, nondistended. +BS. No rebound or guarding.   BACK: No spinal or paraspinal tenderness. No CVA tenderness.  EXTREMITY: No LE swelling or edema. EXTs warm to touch.  MUSCULOSKELETAL: Spontaneous movement in all extremities.  DERMATOLOGICAL: No abnormal rashes or lesions.  NEUROLOGICAL: CN 2-12 grossly intact. No focal deficits. Sensation intact x 4EXT. A&Ox3 (oriented to person, place, and time).  PSYCHIATRIC: Appropriate affect.                            9.3    8.07  )-----------( 466      ( 05 Jan 2021 07:57 )             32.1     PT/INR - ( 05 Jan 2021 15:03 )   PT: 15.1 sec;   INR: 1.33 ratio         PTT - ( 05 Jan 2021 15:03 )  PTT:80.7 sec  01-05    137  |  102  |  7   ----------------------------<  89  4.0   |  24  |  0.94    Ca    9.3      05 Jan 2021 07:57  Phos  3.9     01-05  Mg     2.2     01-05      CAPILLARY BLOOD GLUCOSE                      RADIOLOGY AND ADDITIONAL TESTS:    CONSULTANT NOTES REVIEWED:    CARE DISCUSSED WITH THE FOLLOWING CONSULTANTS/PROVIDERS:

## 2021-01-06 NOTE — PROGRESS NOTE ADULT - ATTENDING COMMENTS
25 y.o. F w/ a hx of recent tonsillectomy, on OCP's hospitalized for extensive VTE (bilateral common iliac DVT, b/l PE w/ pulmonary and renal infarct) currently on Heparin. Patient has some more pain in her thighs and is uncomfortable.     # Acute hypoxic respiratory failure 2/2 PE: Off O2. Per hematology, likely provoked in setting of recent surgery and immobility. Cont Heparin drip pending REJI today. Pain control    I have personally seen, examined and participated in the care of this patient. I have reviewed all pertinent clinical information, including history, physical exam, plan and the resident's note and agree except as noted.

## 2021-01-06 NOTE — PROGRESS NOTE ADULT - PROBLEM SELECTOR PLAN 1
CT angio w/ bilateral PE. On heparin.  - O2 support as needed  - Pain management as below  - TTE showing possible R to L shunt, REJI ordered   - heme consulted for coagulopathy and anemia

## 2021-01-06 NOTE — CONSULT NOTE ADULT - PROBLEM SELECTOR RECOMMENDATION 9
1. Bilateral Tonsil beds are well healed. No evidence of any bleeding sites or potential bleeding sites identified at this time. No contraindication to proceed with REJI due to recent tonsillectomy.

## 2021-01-07 LAB
ALBUMIN SERPL ELPH-MCNC: 3.9 G/DL — SIGNIFICANT CHANGE UP (ref 3.3–5)
ALP SERPL-CCNC: 50 U/L — SIGNIFICANT CHANGE UP (ref 40–120)
ALT FLD-CCNC: 22 U/L — SIGNIFICANT CHANGE UP (ref 4–33)
ANION GAP SERPL CALC-SCNC: 15 MMOL/L — HIGH (ref 7–14)
APTT BLD: 91.2 SEC — HIGH (ref 27–36.3)
AST SERPL-CCNC: 15 U/L — SIGNIFICANT CHANGE UP (ref 4–32)
BILIRUB SERPL-MCNC: 0.4 MG/DL — SIGNIFICANT CHANGE UP (ref 0.2–1.2)
BUN SERPL-MCNC: 9 MG/DL — SIGNIFICANT CHANGE UP (ref 7–23)
CALCIUM SERPL-MCNC: 9.8 MG/DL — SIGNIFICANT CHANGE UP (ref 8.4–10.5)
CHLORIDE SERPL-SCNC: 97 MMOL/L — LOW (ref 98–107)
CO2 SERPL-SCNC: 23 MMOL/L — SIGNIFICANT CHANGE UP (ref 22–31)
CREAT SERPL-MCNC: 0.98 MG/DL — SIGNIFICANT CHANGE UP (ref 0.5–1.3)
GLUCOSE SERPL-MCNC: 70 MG/DL — SIGNIFICANT CHANGE UP (ref 70–99)
HCT VFR BLD CALC: 31.2 % — LOW (ref 34.5–45)
HGB BLD-MCNC: 9.1 G/DL — LOW (ref 11.5–15.5)
MAGNESIUM SERPL-MCNC: 2.1 MG/DL — SIGNIFICANT CHANGE UP (ref 1.6–2.6)
MCHC RBC-ENTMCNC: 20.4 PG — LOW (ref 27–34)
MCHC RBC-ENTMCNC: 29.2 GM/DL — LOW (ref 32–36)
MCV RBC AUTO: 70 FL — LOW (ref 80–100)
NRBC # BLD: 0 /100 WBCS — SIGNIFICANT CHANGE UP
NRBC # FLD: 0 K/UL — SIGNIFICANT CHANGE UP
PHOSPHATE SERPL-MCNC: 3.8 MG/DL — SIGNIFICANT CHANGE UP (ref 2.5–4.5)
PLATELET # BLD AUTO: 428 K/UL — HIGH (ref 150–400)
POTASSIUM SERPL-MCNC: 3.7 MMOL/L — SIGNIFICANT CHANGE UP (ref 3.5–5.3)
POTASSIUM SERPL-SCNC: 3.7 MMOL/L — SIGNIFICANT CHANGE UP (ref 3.5–5.3)
PROT SERPL-MCNC: 8.1 G/DL — SIGNIFICANT CHANGE UP (ref 6–8.3)
RBC # BLD: 4.46 M/UL — SIGNIFICANT CHANGE UP (ref 3.8–5.2)
RBC # FLD: 16 % — HIGH (ref 10.3–14.5)
SODIUM SERPL-SCNC: 135 MMOL/L — SIGNIFICANT CHANGE UP (ref 135–145)
WBC # BLD: 10.75 K/UL — HIGH (ref 3.8–10.5)
WBC # FLD AUTO: 10.75 K/UL — HIGH (ref 3.8–10.5)

## 2021-01-07 PROCEDURE — 99232 SBSQ HOSP IP/OBS MODERATE 35: CPT | Mod: GC

## 2021-01-07 PROCEDURE — 99233 SBSQ HOSP IP/OBS HIGH 50: CPT | Mod: GC

## 2021-01-07 RX ADMIN — OXYCODONE AND ACETAMINOPHEN 2 TABLET(S): 5; 325 TABLET ORAL at 15:59

## 2021-01-07 RX ADMIN — OXYCODONE AND ACETAMINOPHEN 2 TABLET(S): 5; 325 TABLET ORAL at 22:30

## 2021-01-07 RX ADMIN — HEPARIN SODIUM 1700 UNIT(S)/HR: 5000 INJECTION INTRAVENOUS; SUBCUTANEOUS at 09:13

## 2021-01-07 RX ADMIN — OXYCODONE AND ACETAMINOPHEN 2 TABLET(S): 5; 325 TABLET ORAL at 09:51

## 2021-01-07 RX ADMIN — OXYCODONE AND ACETAMINOPHEN 2 TABLET(S): 5; 325 TABLET ORAL at 03:45

## 2021-01-07 NOTE — PROGRESS NOTE ADULT - SUBJECTIVE AND OBJECTIVE BOX
Contact Information:  Serafin Guerin MD,  PGY-1, Internal Medicine  Pager: [phone] ([facility]) ///     CAROLYN LICEA, MRN-[id number]    Patient is a 25y old  Female who presents with a chief complaint of DVT + PE + renal infarct (06 Jan 2021 13:57)      OVERNIGHT EVENTS: No acute events overnight    SUBJECTIVE:        OBJECTIVE:  Vital Signs Last 24 Hrs  T(C): 36.7 (07 Jan 2021 04:28), Max: 37.3 (06 Jan 2021 21:44)  T(F): 98.1 (07 Jan 2021 04:28), Max: 99.2 (06 Jan 2021 21:44)  HR: 93 (07 Jan 2021 04:28) (93 - 102)  BP: 128/81 (07 Jan 2021 04:28) (106/67 - 128/81)  BP(mean): --  RR: 17 (07 Jan 2021 04:28) (17 - 19)  SpO2: 99% (07 Jan 2021 04:28) (98% - 100%)  I&O's Summary      MEDICATIONS  (STANDING):  heparin  Infusion.  Unit(s)/Hr (18 mL/Hr) IV Continuous <Continuous>  influenza   Vaccine 0.5 milliLiter(s) IntraMuscular once    MEDICATIONS  (PRN):  acetaminophen   Tablet .. 975 milliGRAM(s) Oral every 6 hours PRN Mild Pain (1 - 3), Moderate Pain (4 - 6)  heparin   Injectable 8000 Unit(s) IV Push every 6 hours PRN For aPTT less than 40  heparin   Injectable 4000 Unit(s) IV Push every 6 hours PRN For aPTT between 40 - 57  oxycodone    5 mG/acetaminophen 325 mG 2 Tablet(s) Oral every 6 hours PRN Severe Pain (7 - 10)    Allergies    No Known Allergies    Intolerances                   9.1    10.75 )-----------( 428      ( 07 Jan 2021 06:59 )             31.2     PT/INR - ( 05 Jan 2021 15:03 )   PT: 15.1 sec;   INR: 1.33 ratio         PTT - ( 07 Jan 2021 06:59 )  PTT:91.2 sec  01-07    135  |  97<L>  |  9   ----------------------------<  70  3.7   |  23  |  0.98    Ca    9.8      07 Jan 2021 06:59  Phos  3.8     01-07  Mg     2.1     01-07    TPro  8.1  /  Alb  3.9  /  TBili  0.4  /  DBili  x   /  AST  15  /  ALT  22  /  AlkPhos  50  01-07    CAPILLARY BLOOD GLUCOSE        LIVER FUNCTIONS - ( 07 Jan 2021 06:59 )  Alb: 3.9 g/dL / Pro: 8.1 g/dL / ALK PHOS: 50 U/L / ALT: 22 U/L / AST: 15 U/L / GGT: x                       RADIOLOGY AND ADDITIONAL TESTS:    CONSULTANT NOTES REVIEWED:    CARE DISCUSSED WITH THE FOLLOWING CONSULTANTS/PROVIDERS:

## 2021-01-07 NOTE — PROGRESS NOTE ADULT - ASSESSMENT
25F with no previous medical history other than tonsilectomy 2 weeks ago presenting with 1 day of back, lower left leg, chest, and arm pain found to have b/l lobar PE, common iliac DVT extending into internal iliac vein, and left lower pole renal infarct.     #B/L Lobar PE  #Common/Internal Iliac Vein DVT   #Left Lower Pole Renal Infarct   -presented with inspiratory chest pain, shortness of breath, back, arm and leg pain found to have b/l lobar PE and common iliac vein DVT extending into internal iliac vein and left renal kaya infarct  -likely in the setting of obesity, OCP use, and immobilization after surgery along with possible PFO  -some of the hypercoaguable work up sent  -Protein C and S low in the setting of acute clot   -ATIII normal (can also be low whil on heparin)   -unlikely FV Leiden however can complete hypercoaguable work up: LAC, [id number] ab, antiphospholipid ab, prothrombin gene mutation, FV Leiden   -c/w heparin  - APLS labs are negative can be discharged on a DOAC (anticardiolipin ab positive, IgG however only 15.2 titre, not significant)   -will need 6 months of treatment   -avoid estrogen containing OCP use in the future   -TTE with bubble study showing bubbles in LV, possible PFO, REJI confirmed PFO and cardiology f/u  -can follow up at [facility] after discharge    #Microcytic Anemia   -hemoglobin 9.3, MCV 70  -unknown baseline  -labs consistent with SELENE  -can start PO iron supplementation     Adam Rivera MD  Hematology/Oncology Fellow   pager [phone]   After 5pm and on weekends page on call fellow

## 2021-01-07 NOTE — CONSULT NOTE ADULT - ASSESSMENT
26y/o with new PE, common iliac DVT and renal infarct     - c.w AC  - hem recs appreciated   - cards recs appreciated   - obtain repeat LE Duplex given new swelling to asses for new DVT     Discussed with vascular fellow   [id number]

## 2021-01-07 NOTE — CONSULT NOTE ADULT - SUBJECTIVE AND OBJECTIVE BOX
CC: 25y old Female admitted with a chief complaint of DVT + PE + renal infarct,    HPI:  25F with no previous medical history other than tonsilectomy 2 weeks ago presenting with 1 day of back, lower left leg, chest, and arm pain. Per patient, she was recovering from her tonsilectomy when she developed severe lower back and leg pain radiating down her leg. She then developed chest pain and difficulty catching her breath. Her chest pain is pleuritic and improves upon sitting. She denies headache, dizziness, confusion, other neuro symptoms, palpitations, n/v, recent trauma, dysuria. She has no family history of clotting disorders, is a nonsmoker, and uses oral contraceptives for birth control.    In the ED, CT scan revealed bilateral common iliac DVT extending into internal illiac, bilateral PE, and renal infarct. EKG was unremarkable. Patient was given IV morphine for pain control, full coagulation workup was sent, and patient was admitted to medicine. Patient given heparin for anticoagulation.  Patient with echo demonstrating PFO. Patient reports worsening pain and swelling in LLE since admission. Now with inability to ambulate.     PMHx: No pertinent past medical history      PSHx: S/P tonsillectomy      Medications (inpatient): heparin  Infusion.  Unit(s)/Hr IV Continuous <Continuous>  influenza   Vaccine 0.5 milliLiter(s) IntraMuscular once    Medications (PRN):acetaminophen   Tablet .. 975 milliGRAM(s) Oral every 6 hours PRN  heparin   Injectable 8000 Unit(s) IV Push every 6 hours PRN  heparin   Injectable 4000 Unit(s) IV Push every 6 hours PRN  oxycodone    5 mG/acetaminophen 325 mG 2 Tablet(s) Oral every 6 hours PRN    Allergies: No Known Allergies  (Intolerances: )  Social Hx:   Family Hx: No pertinent family history in first degree relatives        Physical Exam  T(C): 36.8  HR: 99 (93 - 99)  BP: 111/69 (111/69 - 128/81)  RR: 18 (17 - 18)  SpO2: 99% (98% - 99%)  Tmax: T(C): , Max: 37.3 (01-06-21 @ 21:44)    General: well developed, well nourished, NAD  Neuro: alert and oriented, no focal deficits, moves all extremities spontaneously  HEENT: NCAT, EOMI, anicteric, mucosa moist  Respiratory: airway patent, respirations unlabored  CVS: regular rate and rhythm  Abdomen: soft, nontender, nondistended  Extremities: no edema, sensation grossly intact, b/l extremities with swelling L>R, palpable DP/PT pulses,   Skin: warm, dry, appropriate color    Labs:                        9.1    10.75 )-----------( 428      ( 07 Jan 2021 06:59 )             31.2     PTT - ( 07 Jan 2021 06:59 )  PTT:91.2 sec  01-07    135  |  97<L>  |  9   ----------------------------<  70  3.7   |  23  |  0.98    Ca    9.8      07 Jan 2021 06:59  Phos  3.8     01-07  Mg     2.1     01-07    TPro  8.1  /  Alb  3.9  /  TBili  0.4  /  DBili  x   /  AST  15  /  ALT  22  /  AlkPhos  50  01-07            Imaging and other studies:  < from: CT Abdomen and Pelvis w/ IV Cont (01.02.21 @ 01:30) >  CHEST:  LUNGS AND LARGE AIRWAYS: Patent central airways. Left lower lobe wedge-shaped peripheral groundglass opacity with areas of central lucency, likely infarct.  PLEURA: No pleural effusion.  VESSELS: Right upper lobe lobar pulmonary embolism extending into the segmental branches. Right lower lobe lobar pulmonary embolism extending into the segmental branches. Left lower lobe lobar pulmonary embolism extending into the segmental branches.  HEART: Heart size is normal. No pericardial effusion. Right heart is not enlarged. MEDIASTINUM AND STACI: No lymphadenopathy.  CHEST WALL AND LOWER NECK: Heterogeneous bilateral thyroid glands.    ABDOMEN AND PELVIS:  LIVER: Within normal limits.  BILE DUCTS: Normal caliber.  GALLBLADDER: Cholelithiasis.  SPLEEN: Within normal limits.  PANCREAS: Within normal limits.  ADRENALS: Within normal limits.  KIDNEYS/URETERS: Wedge-shaped left lower pole renal hypodensity, likely infarct. No stonesor hydronephrosis    BLADDER: Within normal limits.  REPRODUCTIVE ORGANS: Uterus and adnexa within normal limits.    BOWEL: No bowel obstruction. Appendix is normal.  PERITONEUM: No ascites.  VESSELS: Bilateral common iliac DVT with possible extension into the bilateral internal iliac veins.  RETROPERITONEUM/LYMPH NODES: No lymphadenopathy.  ABDOMINAL WALL: Within normal limits.  BONES: Within normal limits.    IMPRESSION:  Bilateral lobar pulmonary embolism extending into the segmental branches with left lower lobe pulmonary infarct, as above.    Bilateral common iliac DVT extending into the bilateral internal iliac veins.    Left lower pole renal infarct.    Cholelithiasis without cholecystitis.

## 2021-01-07 NOTE — PROGRESS NOTE ADULT - ATTENDING COMMENTS
25 y.o. F w/ a hx of recent tonsillectomy, on OCP's hospitalized for extensive VTE (bilateral common iliac DVT, b/l PE w/ pulmonary and renal infarct) currently on Heparin. Patient has stable pain in her thighs but increased swelling. On exam, patient's L leg appears to be more swollen.     # Acute hypoxic respiratory failure 2/2 PE: Off O2. Per hematology, likely provoked in setting of recent surgery and immobility. PFO seen on REJI. Cont Heparin drip. Cardiology c/s. Plan to transition to DOAC once evaluation complete.   # Bilateral common iliac DVT extending into the bilateral internal iliac veins: Increasing pain with swelling and discomfort, weakness. Appears neurovascularly intact though given worsening s/s, will consult Vascular for poss PCD.     I have personally seen, examined and participated in the care of this patient. I have reviewed all pertinent clinical information, including history, physical exam, plan and the resident's note and agree except as noted.

## 2021-01-07 NOTE — PROGRESS NOTE ADULT - ATTENDING COMMENTS
Patient seen in follow up for provoked VTE and renal infarct due to PFO. Cardiology on board. Will need 3-6 months of anticoagulation. Switch to a DOAC. Outpatient follow up in a month.

## 2021-01-07 NOTE — CHART NOTE - NSCHARTNOTEFT_GEN_A_CORE
Called regarding pt's REJI showing R to L shunt and patent PFO. Briefly, 25 F w/ no cardiac hx a/w b/l PE and DVT found to have R to L shunt on TTE and confirmed on REJI. No RV dysfunction. Discussed with interventional attending, no indication for acute closure of PFO at this time. Continue with anticoagulation for tx of underlying DVT/PE and can re-assess as outpatient for need for PFO closure in the future.    Paddy Dewitt PGY5  [phone]  All Cardiology service information can be found 24/7 on [email or web address], password: [email or web address]

## 2021-01-07 NOTE — PROGRESS NOTE ADULT - SUBJECTIVE AND OBJECTIVE BOX
INTERVAL HPI/OVERNIGHT EVENTS:  Patient S&E at bedside. No o/n events. REJI confirmed PFO, cardiology to be consulted today     VITAL SIGNS:  T(F): 98.1 (01-07-21 @ 04:28)  HR: 93 (01-07-21 @ 04:28)  BP: 128/81 (01-07-21 @ 04:28)  RR: 17 (01-07-21 @ 04:28)  SpO2: 99% (01-07-21 @ 04:28)  Wt(kg): --    PHYSICAL EXAM:    Constitutional: NAD  Eyes: EOMI, sclera non-icteric  Neck: supple  Respiratory: CTAB, no wheezes or crackles   Cardiovascular: RRR  Gastrointestinal: soft, NTND, + BS  Extremities: no cyanosis, clubbing or edema   Neurological: awake and alert      MEDICATIONS  (STANDING):  heparin  Infusion.  Unit(s)/Hr (18 mL/Hr) IV Continuous <Continuous>  influenza   Vaccine 0.5 milliLiter(s) IntraMuscular once    MEDICATIONS  (PRN):  acetaminophen   Tablet .. 975 milliGRAM(s) Oral every 6 hours PRN Mild Pain (1 - 3), Moderate Pain (4 - 6)  heparin   Injectable 8000 Unit(s) IV Push every 6 hours PRN For aPTT less than 40  heparin   Injectable 4000 Unit(s) IV Push every 6 hours PRN For aPTT between 40 - 57  oxycodone    5 mG/acetaminophen 325 mG 2 Tablet(s) Oral every 6 hours PRN Severe Pain (7 - 10)      Allergies    No Known Allergies    Intolerances        LABS:                        9.1    10.75 )-----------( 428      ( 07 Jan 2021 06:59 )             31.2     01-07    135  |  97<L>  |  9   ----------------------------<  70  3.7   |  23  |  0.98    Ca    9.8      07 Jan 2021 06:59  Phos  3.8     01-07  Mg     2.1     01-07    TPro  8.1  /  Alb  3.9  /  TBili  0.4  /  DBili  x   /  AST  15  /  ALT  22  /  AlkPhos  50  01-07    PT/INR - ( 05 Jan 2021 15:03 )   PT: 15.1 sec;   INR: 1.33 ratio         PTT - ( 07 Jan 2021 06:59 )  PTT:91.2 sec      RADIOLOGY & ADDITIONAL TESTS:  Studies reviewed.

## 2021-01-08 LAB
ALBUMIN SERPL ELPH-MCNC: 3.7 G/DL — SIGNIFICANT CHANGE UP (ref 3.3–5)
ALP SERPL-CCNC: 65 U/L — SIGNIFICANT CHANGE UP (ref 40–120)
ALT FLD-CCNC: 26 U/L — SIGNIFICANT CHANGE UP (ref 4–33)
ANION GAP SERPL CALC-SCNC: 16 MMOL/L — HIGH (ref 7–14)
APTT BLD: 120.8 SEC — CRITICAL HIGH (ref 27–36.3)
APTT BLD: 88.1 SEC — HIGH (ref 27–36.3)
AST SERPL-CCNC: 21 U/L — SIGNIFICANT CHANGE UP (ref 4–32)
BILIRUB SERPL-MCNC: 0.4 MG/DL — SIGNIFICANT CHANGE UP (ref 0.2–1.2)
BUN SERPL-MCNC: 9 MG/DL — SIGNIFICANT CHANGE UP (ref 7–23)
CALCIUM SERPL-MCNC: 9.7 MG/DL — SIGNIFICANT CHANGE UP (ref 8.4–10.5)
CHLORIDE SERPL-SCNC: 98 MMOL/L — SIGNIFICANT CHANGE UP (ref 98–107)
CO2 SERPL-SCNC: 20 MMOL/L — LOW (ref 22–31)
CREAT SERPL-MCNC: 0.94 MG/DL — SIGNIFICANT CHANGE UP (ref 0.5–1.3)
GLUCOSE SERPL-MCNC: 81 MG/DL — SIGNIFICANT CHANGE UP (ref 70–99)
HCT VFR BLD CALC: 33.4 % — LOW (ref 34.5–45)
HGB BLD-MCNC: 9.7 G/DL — LOW (ref 11.5–15.5)
MAGNESIUM SERPL-MCNC: 2.2 MG/DL — SIGNIFICANT CHANGE UP (ref 1.6–2.6)
MCHC RBC-ENTMCNC: 20.5 PG — LOW (ref 27–34)
MCHC RBC-ENTMCNC: 29 GM/DL — LOW (ref 32–36)
MCV RBC AUTO: 70.5 FL — LOW (ref 80–100)
NRBC # BLD: 0 /100 WBCS — SIGNIFICANT CHANGE UP
NRBC # FLD: 0 K/UL — SIGNIFICANT CHANGE UP
PHOSPHATE SERPL-MCNC: 3.8 MG/DL — SIGNIFICANT CHANGE UP (ref 2.5–4.5)
PLATELET # BLD AUTO: 520 K/UL — HIGH (ref 150–400)
POTASSIUM SERPL-MCNC: 4.1 MMOL/L — SIGNIFICANT CHANGE UP (ref 3.5–5.3)
POTASSIUM SERPL-SCNC: 4.1 MMOL/L — SIGNIFICANT CHANGE UP (ref 3.5–5.3)
PROT SERPL-MCNC: 8.3 G/DL — SIGNIFICANT CHANGE UP (ref 6–8.3)
RBC # BLD: 4.74 M/UL — SIGNIFICANT CHANGE UP (ref 3.8–5.2)
RBC # FLD: 15.9 % — HIGH (ref 10.3–14.5)
SODIUM SERPL-SCNC: 134 MMOL/L — LOW (ref 135–145)
WBC # BLD: 11.62 K/UL — HIGH (ref 3.8–10.5)
WBC # FLD AUTO: 11.62 K/UL — HIGH (ref 3.8–10.5)

## 2021-01-08 PROCEDURE — 93970 EXTREMITY STUDY: CPT | Mod: 26

## 2021-01-08 PROCEDURE — 99233 SBSQ HOSP IP/OBS HIGH 50: CPT | Mod: GC

## 2021-01-08 RX ORDER — OXYCODONE HYDROCHLORIDE 5 MG/1
5 TABLET ORAL EVERY 6 HOURS
Refills: 0 | Status: DISCONTINUED | OUTPATIENT
Start: 2021-01-08 | End: 2021-01-08

## 2021-01-08 RX ORDER — OXYCODONE HYDROCHLORIDE 5 MG/1
5 TABLET ORAL EVERY 4 HOURS
Refills: 0 | Status: DISCONTINUED | OUTPATIENT
Start: 2021-01-08 | End: 2021-01-14

## 2021-01-08 RX ORDER — OXYCODONE HYDROCHLORIDE 5 MG/1
10 TABLET ORAL EVERY 4 HOURS
Refills: 0 | Status: DISCONTINUED | OUTPATIENT
Start: 2021-01-08 | End: 2021-01-14

## 2021-01-08 RX ORDER — ACETAMINOPHEN 500 MG
975 TABLET ORAL EVERY 8 HOURS
Refills: 0 | Status: DISCONTINUED | OUTPATIENT
Start: 2021-01-08 | End: 2021-01-14

## 2021-01-08 RX ADMIN — Medication 975 MILLIGRAM(S): at 23:31

## 2021-01-08 RX ADMIN — HEPARIN SODIUM 1500 UNIT(S)/HR: 5000 INJECTION INTRAVENOUS; SUBCUTANEOUS at 09:51

## 2021-01-08 RX ADMIN — OXYCODONE HYDROCHLORIDE 10 MILLIGRAM(S): 5 TABLET ORAL at 20:07

## 2021-01-08 RX ADMIN — HEPARIN SODIUM 1500 UNIT(S)/HR: 5000 INJECTION INTRAVENOUS; SUBCUTANEOUS at 17:15

## 2021-01-08 RX ADMIN — OXYCODONE AND ACETAMINOPHEN 2 TABLET(S): 5; 325 TABLET ORAL at 04:40

## 2021-01-08 RX ADMIN — Medication 975 MILLIGRAM(S): at 14:11

## 2021-01-08 RX ADMIN — OXYCODONE HYDROCHLORIDE 10 MILLIGRAM(S): 5 TABLET ORAL at 14:11

## 2021-01-08 RX ADMIN — OXYCODONE HYDROCHLORIDE 5 MILLIGRAM(S): 5 TABLET ORAL at 10:33

## 2021-01-08 NOTE — PROGRESS NOTE ADULT - ATTENDING COMMENTS
25 y.o. F w/ a hx of recent tonsillectomy, on OCP's hospitalized for extensive VTE (bilateral common iliac DVT, b/l PE w/ pulmonary and renal infarct) currently on Heparin. Patient has stable pain in her thighs but increased swelling and weakness.     # Acute hypoxic respiratory failure 2/2 PE: Off O2. Per hematology, likely provoked in setting of recent surgery and immobility. PFO seen on REJI. Cont Heparin drip, will switch to DOAC pending Vascular eval. F/u Cardiology OP for PFO.   # Bilateral common iliac DVT extending into the bilateral internal iliac veins: Increasing pain with swelling and discomfort, weakness. Appears neurovascularly intact though given worsening s/s, will d/w Vascular need for poss thrombectomy.     I have personally seen, examined and participated in the care of this patient. I have reviewed all pertinent clinical information, including history, physical exam, plan and the resident's note and agree except as noted.

## 2021-01-08 NOTE — PROGRESS NOTE ADULT - ASSESSMENT
26y/o with new PE, common iliac DVT and renal infarct     - c/w AC  - heme recs appreciated   - cards recs appreciated   - obtain repeat LE Duplex given new swelling to asses for new DVT   - no acute vascular surgery intervention at this time      Kaela Velez, PGY2  Vascular Surgery - C Team Surgery [id number]

## 2021-01-08 NOTE — CHART NOTE - NSCHARTNOTEFT_GEN_A_CORE
25F with no previous medical history other than tonsilectomy 2 weeks ago presenting with 1 day of back, lower left leg, chest, and arm pain found to have b/l lobar PE, common iliac DVT extending into internal iliac vein, and left lower pole renal infarct.     #B/L Lobar PE  #Common/Internal Iliac Vein DVT   #Left Lower Pole Renal Infarct   -presented with inspiratory chest pain, shortness of breath, back, arm and leg pain found to have b/l lobar PE and common iliac vein DVT extending into internal iliac vein and left renal kaya infarct  -likely in the setting of obesity, OCP use, and immobilization after surgery along with possible PFO  -some of the hypercoaguable work up sent  -Protein C and S low in the setting of acute clot   -ATIII normal (can also be low whil on heparin)   -will follow up rest of hypercoaguable work up outpatient   -c/w heparin  - APLS labs are negative can be discharged on a DOAC (anticardiolipin ab positive, IgG however only 15.2 titre, not significant)   -will need 6 months of treatment   -avoid estrogen containing OCP use in the future   -TTE with bubble study showing bubbles in LV, possible PFO, REJI confirmed PFO and cardiology f/u outpatient   -can follow up at [facility] after discharge, new patient scheduling unit [phone]    #Microcytic Anemia   -hemoglobin 9.3, MCV 70  -unknown baseline  -labs consistent with SELENE  -c/w PO iron supplementation       Hematology to sign off. Please call with any questions/concerns. Can follow up outpatient.     Adam Rivera MD  Hematology/Oncology Fellow   pager [phone]   After 5pm and on weekends page on call fellow

## 2021-01-08 NOTE — PROGRESS NOTE ADULT - PROBLEM SELECTOR PLAN 1
CT angio w/ bilateral PE. On heparin.  - O2 support as needed  - Pain management as below  - TTE showing possible R to L shunt, REJI ordered   - heme consulted for coagulopathy and anemia CT angio w/ bilateral PE. On heparin.  - O2 support as needed  - Pain management as below  - TTE showing possible R to L shunt, REJI confirmed, cardiology recs appreciated; no indication for acute closure at the time  - heme consulted for coagulopathy and anemia

## 2021-01-08 NOTE — PROGRESS NOTE ADULT - PROBLEM SELECTOR PLAN 4
- Tylenol 650mg PRN q6 for mild/moderate pain  - Morphine 2 discontinued, added tramadol   - Revaluate and adjust as needed - Tylenol 650mg PRN q6 for mild/moderate pain  - Oxycodone 5 mg Q6H for severe pain  - Revaluate and adjust as needed

## 2021-01-08 NOTE — PROGRESS NOTE ADULT - SUBJECTIVE AND OBJECTIVE BOX
VASCULAR SURGERY PROGRESS NOTE    S: Patient seen and examined on AM rounds no acute complaints no acute events overnight. States pain and swelling improved on heparin infusion.    O: Vital Signs  T(C): 37.1 (01-08 @ 13:38), Max: 37.1 (01-08 @ 13:38)  HR: 108 (01-08 @ 13:38) (108 - 110)  BP: 117/75 (01-08 @ 13:38) (115/72 - 119/83)  RR: 19 (01-08 @ 13:38) (18 - 19)  SpO2: 100% (01-08 @ 13:38) (100% - 100%)    General: well developed, well nourished, NAD  Neuro: alert and oriented, no focal deficits, moves all extremities spontaneously  HEENT: NCAT, EOMI, anicteric, mucosa moist  Respiratory: airway patent, respirations unlabored  CVS: regular rate and rhythm  Abdomen: soft, nontender, nondistended  Extremities: no edema, sensation grossly intact, b/l extremities with swelling L>R, palpable DP/PT pulses,   Skin: warm, dry, appropriate color                            9.7    11.62 )-----------( 520      ( 08 Jan 2021 07:40 )             33.4   01-08    134<L>  |  98  |  9   ----------------------------<  81  4.1   |  20<L>  |  0.94    Ca    9.7      08 Jan 2021 07:40  Phos  3.8     01-08  Mg     2.2     01-08    TPro  8.3  /  Alb  3.7  /  TBili  0.4  /  DBili  x   /  AST  21  /  ALT  26  /  AlkPhos  65  01-08

## 2021-01-09 LAB
ALBUMIN SERPL ELPH-MCNC: 3.7 G/DL — SIGNIFICANT CHANGE UP (ref 3.3–5)
ALP SERPL-CCNC: 99 U/L — SIGNIFICANT CHANGE UP (ref 40–120)
ALT FLD-CCNC: 39 U/L — HIGH (ref 4–33)
ANION GAP SERPL CALC-SCNC: 12 MMOL/L — SIGNIFICANT CHANGE UP (ref 7–14)
APTT BLD: 69.1 SEC — HIGH (ref 27–36.3)
AST SERPL-CCNC: 39 U/L — HIGH (ref 4–32)
BILIRUB SERPL-MCNC: 0.6 MG/DL — SIGNIFICANT CHANGE UP (ref 0.2–1.2)
BUN SERPL-MCNC: 7 MG/DL — SIGNIFICANT CHANGE UP (ref 7–23)
CALCIUM SERPL-MCNC: 10 MG/DL — SIGNIFICANT CHANGE UP (ref 8.4–10.5)
CHLORIDE SERPL-SCNC: 96 MMOL/L — LOW (ref 98–107)
CO2 SERPL-SCNC: 23 MMOL/L — SIGNIFICANT CHANGE UP (ref 22–31)
CREAT SERPL-MCNC: 0.93 MG/DL — SIGNIFICANT CHANGE UP (ref 0.5–1.3)
GLUCOSE SERPL-MCNC: 91 MG/DL — SIGNIFICANT CHANGE UP (ref 70–99)
HCT VFR BLD CALC: 32.8 % — LOW (ref 34.5–45)
HGB BLD-MCNC: 9.7 G/DL — LOW (ref 11.5–15.5)
MAGNESIUM SERPL-MCNC: 2.3 MG/DL — SIGNIFICANT CHANGE UP (ref 1.6–2.6)
MCHC RBC-ENTMCNC: 20.2 PG — LOW (ref 27–34)
MCHC RBC-ENTMCNC: 29.6 GM/DL — LOW (ref 32–36)
MCV RBC AUTO: 68.3 FL — LOW (ref 80–100)
NRBC # BLD: 0 /100 WBCS — SIGNIFICANT CHANGE UP
NRBC # FLD: 0 K/UL — SIGNIFICANT CHANGE UP
PHOSPHATE SERPL-MCNC: 4.1 MG/DL — SIGNIFICANT CHANGE UP (ref 2.5–4.5)
PLATELET # BLD AUTO: 479 K/UL — HIGH (ref 150–400)
POTASSIUM SERPL-MCNC: 4 MMOL/L — SIGNIFICANT CHANGE UP (ref 3.5–5.3)
POTASSIUM SERPL-SCNC: 4 MMOL/L — SIGNIFICANT CHANGE UP (ref 3.5–5.3)
PROT SERPL-MCNC: 8.3 G/DL — SIGNIFICANT CHANGE UP (ref 6–8.3)
RBC # BLD: 4.8 M/UL — SIGNIFICANT CHANGE UP (ref 3.8–5.2)
RBC # FLD: 15.9 % — HIGH (ref 10.3–14.5)
SODIUM SERPL-SCNC: 131 MMOL/L — LOW (ref 135–145)
WBC # BLD: 13.27 K/UL — HIGH (ref 3.8–10.5)
WBC # FLD AUTO: 13.27 K/UL — HIGH (ref 3.8–10.5)

## 2021-01-09 PROCEDURE — 99233 SBSQ HOSP IP/OBS HIGH 50: CPT

## 2021-01-09 PROCEDURE — 99233 SBSQ HOSP IP/OBS HIGH 50: CPT | Mod: GC

## 2021-01-09 RX ORDER — ACETAMINOPHEN 500 MG
650 TABLET ORAL ONCE
Refills: 0 | Status: COMPLETED | OUTPATIENT
Start: 2021-01-09 | End: 2021-01-09

## 2021-01-09 RX ORDER — LIDOCAINE 4 G/100G
1 CREAM TOPICAL DAILY
Refills: 0 | Status: DISCONTINUED | OUTPATIENT
Start: 2021-01-09 | End: 2021-01-11

## 2021-01-09 RX ORDER — FONDAPARINUX SODIUM 2.5 MG/.5ML
10 INJECTION, SOLUTION SUBCUTANEOUS DAILY
Refills: 0 | Status: DISCONTINUED | OUTPATIENT
Start: 2021-01-09 | End: 2021-01-10

## 2021-01-09 RX ADMIN — LIDOCAINE 1 PATCH: 4 CREAM TOPICAL at 22:54

## 2021-01-09 RX ADMIN — OXYCODONE HYDROCHLORIDE 10 MILLIGRAM(S): 5 TABLET ORAL at 04:15

## 2021-01-09 RX ADMIN — OXYCODONE HYDROCHLORIDE 10 MILLIGRAM(S): 5 TABLET ORAL at 21:33

## 2021-01-09 RX ADMIN — Medication 975 MILLIGRAM(S): at 05:03

## 2021-01-09 RX ADMIN — Medication 650 MILLIGRAM(S): at 12:04

## 2021-01-09 RX ADMIN — HEPARIN SODIUM 1500 UNIT(S)/HR: 5000 INJECTION INTRAVENOUS; SUBCUTANEOUS at 01:22

## 2021-01-09 RX ADMIN — Medication 975 MILLIGRAM(S): at 14:19

## 2021-01-09 RX ADMIN — OXYCODONE HYDROCHLORIDE 10 MILLIGRAM(S): 5 TABLET ORAL at 17:44

## 2021-01-09 RX ADMIN — Medication 975 MILLIGRAM(S): at 21:33

## 2021-01-09 RX ADMIN — OXYCODONE HYDROCHLORIDE 5 MILLIGRAM(S): 5 TABLET ORAL at 10:05

## 2021-01-09 NOTE — PROGRESS NOTE ADULT - SUBJECTIVE AND OBJECTIVE BOX
Estela Saxena ([facility]) PGY-2  Pager: NS) [phone]/ (LXZ) [phone]    Patient is a 25y old  Female who presents with a chief complaint of DVT + PE + renal infarct (08 Jan 2021 20:10)      SUBJECTIVE / OVERNIGHT EVENTS:  No acute events over night. Denies any fevers/chills, headache, CP, SOB, abd pain, N/V/D, constipation, or leg swelling.       MEDICATIONS  (STANDING):  acetaminophen   Tablet .. 975 milliGRAM(s) Oral every 8 hours  heparin  Infusion.  Unit(s)/Hr (18 mL/Hr) IV Continuous <Continuous>  influenza   Vaccine 0.5 milliLiter(s) IntraMuscular once    MEDICATIONS  (PRN):  heparin   Injectable 8000 Unit(s) IV Push every 6 hours PRN For aPTT less than 40  heparin   Injectable 4000 Unit(s) IV Push every 6 hours PRN For aPTT between 40 - 57  oxyCODONE    IR 10 milliGRAM(s) Oral every 4 hours PRN Severe Pain (7 - 10)  oxyCODONE    IR 5 milliGRAM(s) Oral every 4 hours PRN Moderate Pain (4 - 6)          OBJECTIVE:  Vital Signs Last 24 Hrs  T(C): 36.7 (09 Jan 2021 04:58), Max: 38.2 (08 Jan 2021 22:03)  T(F): 98 (09 Jan 2021 04:58), Max: 100.7 (08 Jan 2021 22:03)  HR: 104 (09 Jan 2021 04:58) (104 - 108)  BP: 133/69 (09 Jan 2021 04:58) (117/75 - 133/69)  BP(mean): --  RR: 18 (09 Jan 2021 04:58) (18 - 19)  SpO2: 100% (09 Jan 2021 04:58) (100% - 100%)  PHYSICAL EXAM:  GENERAL: NAD, well-developed  HEAD:  Atraumatic, Normocephalic  EYES: conjunctiva and sclera clear  NECK: Supple, No JVD  CHEST/LUNG: Clear to auscultation bilaterally; No wheeze  HEART: Regular rate and rhythm; No murmurs, rubs, or gallops  ABDOMEN: Soft, Nontender, Nondistended; Bowel sounds present  EXTREMITIES:  No clubbing, cyanosis, or edema  PSYCH: AAOx3  NEUROLOGY: non-focal  SKIN: No rashes or lesions    CAPILLARY BLOOD GLUCOSE        I&O's Summary            LABS:                        9.7    11.62 )-----------( 520      ( 08 Jan 2021 07:40 )             33.4     01-08    134<L>  |  98  |  9   ----------------------------<  81  4.1   |  20<L>  |  0.94    Ca    9.7      08 Jan 2021 07:40  Phos  3.8     01-08  Mg     2.2     01-08    TPro  8.3  /  Alb  3.7  /  TBili  0.4  /  DBili  x   /  AST  21  /  ALT  26  /  AlkPhos  65  01-08    PTT - ( 08 Jan 2021 23:57 )  PTT:69.1 sec            RADIOLOGY & ADDITIONAL TESTS:       Estela Saxena ([place]) PGY-2  Pager: NS) [phone]/ (AOS) [phone]    Patient is a 25y old  Female who presents with a chief complaint of DVT + PE + renal infarct (08 Jan 2021 20:10)      SUBJECTIVE / OVERNIGHT EVENTS:  complained of increased pain and swelling of LLE. Assessed by NF, low concern for compartment syndrome. Assessed by Vascular this AM who ACE wrapped LLE and asked to keep leg elevated. Seen this AM, patient reports her pain is improved.       MEDICATIONS  (STANDING):  acetaminophen   Tablet .. 975 milliGRAM(s) Oral every 8 hours  heparin  Infusion.  Unit(s)/Hr (18 mL/Hr) IV Continuous <Continuous>  influenza   Vaccine 0.5 milliLiter(s) IntraMuscular once    MEDICATIONS  (PRN):  heparin   Injectable 8000 Unit(s) IV Push every 6 hours PRN For aPTT less than 40  heparin   Injectable 4000 Unit(s) IV Push every 6 hours PRN For aPTT between 40 - 57  oxyCODONE    IR 10 milliGRAM(s) Oral every 4 hours PRN Severe Pain (7 - 10)  oxyCODONE    IR 5 milliGRAM(s) Oral every 4 hours PRN Moderate Pain (4 - 6)          OBJECTIVE:  Vital Signs Last 24 Hrs  T(C): 36.7 (09 Jan 2021 04:58), Max: 38.2 (08 Jan 2021 22:03)  T(F): 98 (09 Jan 2021 04:58), Max: 100.7 (08 Jan 2021 22:03)  HR: 104 (09 Jan 2021 04:58) (104 - 108)  BP: 133/69 (09 Jan 2021 04:58) (117/75 - 133/69)  BP(mean): --  RR: 18 (09 Jan 2021 04:58) (18 - 19)  SpO2: 100% (09 Jan 2021 04:58) (100% - 100%)  PHYSICAL EXAM:  GENERAL: NAD,  EYES: conjunctiva and sclera clear  NECK: Supple, No JVD  CHEST/LUNG: Clear to auscultation bilaterally; No wheeze  HEART: Regular rate and rhythm; No murmurs, rubs, or gallops  ABDOMEN: Soft, Nontender, Nondistended; Bowel sounds present  EXTREMITIES: LLE swelling > right. Non tender to palpation. DP pulses palpable, warm, bilaterally, able to wiggles toes. Non tense.  PSYCH: AAOx3      CAPILLARY BLOOD GLUCOSE        I&O's Summary            LABS:                        9.7    11.62 )-----------( 520      ( 08 Jan 2021 07:40 )             33.4     01-08    134<L>  |  98  |  9   ----------------------------<  81  4.1   |  20<L>  |  0.94    Ca    9.7      08 Jan 2021 07:40  Phos  3.8     01-08  Mg     2.2     01-08    TPro  8.3  /  Alb  3.7  /  TBili  0.4  /  DBili  x   /  AST  21  /  ALT  26  /  AlkPhos  65  01-08    PTT - ( 08 Jan 2021 23:57 )  PTT:69.1 sec            RADIOLOGY & ADDITIONAL TESTS:       Estela Saxena ([place]) PGY-2  Pager: NS) [phone]/ (IYE) [phone]    Patient is a 25y old  Female who presents with a chief complaint of DVT + PE + renal infarct (08 Jan 2021 20:10)      SUBJECTIVE / OVERNIGHT EVENTS:  complained of increased pain and swelling of LLE. Assessed by NF, low concern for compartment syndrome. Assessed by Vascular this AM who ACE wrapped LLE and asked to keep leg elevated. Seen this AM, patient reports her pain is improved.     Febrile overnight to 100.7F, however patient had 12 heat packs on. bld clx ordered by [facility]    MEDICATIONS  (STANDING):  acetaminophen   Tablet .. 975 milliGRAM(s) Oral every 8 hours  heparin  Infusion.  Unit(s)/Hr (18 mL/Hr) IV Continuous <Continuous>  influenza   Vaccine 0.5 milliLiter(s) IntraMuscular once    MEDICATIONS  (PRN):  heparin   Injectable 8000 Unit(s) IV Push every 6 hours PRN For aPTT less than 40  heparin   Injectable 4000 Unit(s) IV Push every 6 hours PRN For aPTT between 40 - 57  oxyCODONE    IR 10 milliGRAM(s) Oral every 4 hours PRN Severe Pain (7 - 10)  oxyCODONE    IR 5 milliGRAM(s) Oral every 4 hours PRN Moderate Pain (4 - 6)          OBJECTIVE:  Vital Signs Last 24 Hrs  T(C): 36.7 (09 Jan 2021 04:58), Max: 38.2 (08 Jan 2021 22:03)  T(F): 98 (09 Jan 2021 04:58), Max: 100.7 (08 Jan 2021 22:03)  HR: 104 (09 Jan 2021 04:58) (104 - 108)  BP: 133/69 (09 Jan 2021 04:58) (117/75 - 133/69)  BP(mean): --  RR: 18 (09 Jan 2021 04:58) (18 - 19)  SpO2: 100% (09 Jan 2021 04:58) (100% - 100%)  PHYSICAL EXAM:  GENERAL: NAD,  EYES: conjunctiva and sclera clear  NECK: Supple, No JVD  CHEST/LUNG: Clear to auscultation bilaterally; No wheeze  HEART: Regular rate and rhythm; No murmurs, rubs, or gallops  ABDOMEN: Soft, Nontender, Nondistended; Bowel sounds present  EXTREMITIES: LLE swelling > right. Non tender to palpation. DP pulses palpable, warm, bilaterally, able to wiggles toes. Non tense.  PSYCH: AAOx3      CAPILLARY BLOOD GLUCOSE        I&O's Summary            LABS:                        9.7    11.62 )-----------( 520      ( 08 Jan 2021 07:40 )             33.4     01-08    134<L>  |  98  |  9   ----------------------------<  81  4.1   |  20<L>  |  0.94    Ca    9.7      08 Jan 2021 07:40  Phos  3.8     01-08  Mg     2.2     01-08    TPro  8.3  /  Alb  3.7  /  TBili  0.4  /  DBili  x   /  AST  21  /  ALT  26  /  AlkPhos  65  01-08    PTT - ( 08 Jan 2021 23:57 )  PTT:69.1 sec            RADIOLOGY & ADDITIONAL TESTS:

## 2021-01-09 NOTE — PROGRESS NOTE ADULT - ATTENDING COMMENTS
Pt still with significant morbidity related to LE clotting -- pain and swelling are quite bothersome. Continue ACE wrap and analgesia. Heparin gtt for now, though anticipate transition to DOAC prior to discharge. She will need at least 6 months of treatment, possibly longer depending on hypercoagulability profile. Appreciate Vascular consult.

## 2021-01-09 NOTE — CHART NOTE - NSCHARTNOTEFT_GEN_A_CORE
Chart reviewed and discussed with attending. Called patient to obtain further history over the phone.     Pt is a 26 yo F with no significant medical history except for tonsillectomy 2 weeks ago who presented to [facility] ED on 1/1 with 1 day history of back,  chest, and arm pain. On initial imaging she was found to have b/l lobar PE, common iliac DVT extending into internal iliac vein, and left lower pole renal infarct. pt was started on heparin but now found to have new acute DVT extending from the left calf veins, above the knee, to the level of the left external iliac vein. She also found to have possible PFO on Echo.     Pt denies any history of fever, chills, weight loss, previous pregnancy/ miscarriage, previous VTE, alopecia, vision disturbance, mouth/ nasal ulcer, rash, joint pain, GI/  complaints, Raynaud's. She denies any family history of autoimmunity or hypercoagulability disorder.       Impression:   # extensive thromboembolic events likely in the setting of obesity, OCP and immobilization s/p tonsillectomy, DDX; APS, malignancy, infection  One should consider APS as a differential in a young female with extensive VTE. However, given negative typical APLs ( Lupus Anticoagulant, Anticardiolipin Ab: only Ig G slightly elevated to 15 once, Beta 2 glycoprotein ) , no evidence of organ failure, and no other clinical evidence for any underlying autoimmune disease we have low suspicion for APS/ CAPS at this time.     Recommend:   - Please send Atypical APLs including PSPT, Phosphatidylserine, Phosphatidylinositol, Phosphatidylethanolamine ( send out tests. please send with requisition from ), complement cascade, and urine pro/cr   - C/w AC as per Hem: switched from heparin to Fondaparinux  - infection and malignancy w/u as primary team   - please resend COVID swab    We will see the patient tomorrow, official consult to be followed     Case was discussed with Dr. Pineda Bateman MD  Rheum fellow PGY 5  Pager [phone]/ [phone] Chart reviewed and discussed with attending. Called patient to obtain further history over the phone.     Pt is a 26 yo F with no significant medical history except for tonsillectomy 2 weeks ago who presented to [facility] ED on 1/1 with 1 day history of back,  chest, and arm pain. On initial imaging she was found to have b/l lobar PE, common iliac DVT extending into internal iliac vein, and left lower pole renal infarct. pt was started on heparin but now found to have new acute DVT extending from the left calf veins, above the knee, to the level of the left external iliac vein. She also found to have possible PFO on Echo.     Pt denies any history of fever, chills, weight loss, previous pregnancy/ miscarriage, previous VTE, alopecia, vision disturbance, mouth/ nasal ulcer, rash, joint pain, GI/  complaints, Raynaud's. She denies any family history of autoimmunity or hypercoagulability disorder.       Impression:   # extensive thromboembolic events likely in the setting of obesity, OCP and immobilization s/p tonsillectomy, DDX; APS, malignancy, infection  One should consider APS as a differential in a young female with extensive VTE. However, given negative typical APLs ( Lupus Anticoagulant, Anticardiolipin Ab: only Ig G slightly elevated to 15 once, Beta 2 glycoprotein ) , no evidence of organ failure, and no other clinical evidence for any underlying autoimmune disease we have low suspicion for APS/ CAPS at this time.     Recommend:   - Please send Atypical APLs including PSPT, Phosphatidylserine, Phosphatidylinositol, Phosphatidylethanolamine ( send out tests. please send with requisition from ), complement cascade, and urine pro/cr   - C/w AC as per Heme: switched from heparin to Fondaparinux  - infection and malignancy w/u as primary team   - please resend COVID swab    We will see the patient tomorrow, official consult to be followed     Case was discussed with Dr. Pineda Bateman MD  Rheum fellow PGY 5  Pager [phone]/ [phone]

## 2021-01-09 NOTE — PROGRESS NOTE ADULT - PROBLEM SELECTOR PLAN 2
Hx of OCP use  - Anticoagulated on heparin  - s/p doppler  - Pain management as below  - F/u coagulapathy workup likely provoked by recent surgery and OCP use.  - Anticoagulated on heparin with therapeutic aptt  - repeat LLE US showed new DVT on hep gtt. Per discussion with heme, wouldn't call it AC failure given patient has been on AC since 1/2. However will leave formal recs  - As per convo with vascular, no urgent intervention needed at this time, c/w medical management. If increased swelling, will call vascular back for thrombectomy.  - Pain management as below  - F/u coagulapathy workup

## 2021-01-09 NOTE — PROGRESS NOTE ADULT - SUBJECTIVE AND OBJECTIVE BOX
INTERVAL EVENTS:      Vital Signs Last 24 Hrs  T(C): 36.8 (09 Jan 2021 13:00), Max: 38.2 (08 Jan 2021 22:03)  T(F): 98.3 (09 Jan 2021 13:00), Max: 100.7 (08 Jan 2021 22:03)  HR: 102 (09 Jan 2021 13:00) (102 - 104)  BP: 114/76 (09 Jan 2021 13:00) (114/76 - 133/69)  BP(mean): --  RR: 18 (09 Jan 2021 13:00) (18 - 19)  SpO2: 100% (09 Jan 2021 13:00) (100% - 100%)      PHYSICAL EXAM:   GENERAL: no acute distress  HEENT: EOMI, neck supple  RESPIRATORY: LCTAB/L, no rhonchi, rales, or wheezing  CARDIOVASCULAR: RRR, no murmurs, gallops, rubs  ABDOMINAL: soft, non-tender, non-distended, positive bowel sounds   EXTREMITIES: Left lower extremity swelling seems getting worse. Patient leg was wrapped with ace and elevated.   NEUROLOGICAL: alert and oriented x 3, non-focal  SKIN: no rashes or lesions   MUSCULOSKELETAL: no gross joint deformity                          9.7    13.27 )-----------( 479      ( 09 Jan 2021 08:05 )             32.8     01-09    131<L>  |  96<L>  |  7   ----------------------------<  91  4.0   |  23  |  0.93    Ca    10.0      09 Jan 2021 08:05  Phos  4.1     01-09  Mg     2.3     01-09    TPro  8.3  /  Alb  3.7  /  TBili  0.6  /  DBili  x   /  AST  39<H>  /  ALT  39<H>  /  AlkPhos  99  01-09    PTT - ( 08 Jan 2021 23:57 )  PTT:69.1 sec    MEDICATIONS  (STANDING):  acetaminophen   Tablet .. 975 milliGRAM(s) Oral every 8 hours  heparin  Infusion.  Unit(s)/Hr (18 mL/Hr) IV Continuous <Continuous>  influenza   Vaccine 0.5 milliLiter(s) IntraMuscular once       INTERVAL EVENTS:  Patient seen and examined at bedside. c/o of leg swelling.     Vital Signs Last 24 Hrs  T(C): 36.8 (09 Jan 2021 13:00), Max: 38.2 (08 Jan 2021 22:03)  T(F): 98.3 (09 Jan 2021 13:00), Max: 100.7 (08 Jan 2021 22:03)  HR: 102 (09 Jan 2021 13:00) (102 - 104)  BP: 114/76 (09 Jan 2021 13:00) (114/76 - 133/69)  BP(mean): --  RR: 18 (09 Jan 2021 13:00) (18 - 19)  SpO2: 100% (09 Jan 2021 13:00) (100% - 100%)      PHYSICAL EXAM:   GENERAL: no acute distress  RESPIRATORY: LCTAB/L, no rhonchi, rales, or wheezing  CARDIOVASCULAR: RRR, no murmurs, gallops, rubs  ABDOMINAL: soft, non-tender, non-distended, positive bowel sounds   EXTREMITIES: Left lower extremity swelling seems getting worse. Patient leg was wrapped with ace and elevated.   NEUROLOGICAL: alert and oriented x 3, non-focal  SKIN: no rashes or lesions   MUSCULOSKELETAL: no gross joint deformity                          9.7    13.27 )-----------( 479      ( 09 Jan 2021 08:05 )             32.8     01-09    131<L>  |  96<L>  |  7   ----------------------------<  91  4.0   |  23  |  0.93    Ca    10.0      09 Jan 2021 08:05  Phos  4.1     01-09  Mg     2.3     01-09    TPro  8.3  /  Alb  3.7  /  TBili  0.6  /  DBili  x   /  AST  39<H>  /  ALT  39<H>  /  AlkPhos  99  01-09    PTT - ( 08 Jan 2021 23:57 )  PTT:69.1 sec    MEDICATIONS  (STANDING):  acetaminophen   Tablet .. 975 milliGRAM(s) Oral every 8 hours  heparin  Infusion.  Unit(s)/Hr (18 mL/Hr) IV Continuous <Continuous>  influenza   Vaccine 0.5 milliLiter(s) IntraMuscular once

## 2021-01-09 NOTE — PROGRESS NOTE ADULT - ASSESSMENT
25F w/ recent tonsillectomy, nonsmoker, using birth control presenting w/ 1 day of SOB, chest pain, back pain, and leg pain w/ no other medical history. In the ED, imaging revealed bilateral DVT w/ high clot burden, bilateral PE, and renal infarct; patient was therapeutically anticoagulated with heparin gtt. Now undergoing hypercoagulable workup. TTE showing possible R to L shunt, REJI ordered. Heme consulted for coagulopathy and anemia 25 y.o. F w/ a hx of recent tonsillectomy, on OCP's p/w SOB, CP, Back pain, and leg pain found to have extensive VTE (bilateral common iliac DVT, b/l PE w/ pulmonary and renal infarct) currently on Heparin gtt.

## 2021-01-09 NOTE — PROGRESS NOTE ADULT - PROBLEM SELECTOR PLAN 1
CT angio w/ bilateral PE. On heparin.  - O2 support as needed  - Pain management as below  - TTE showing possible R to L shunt, REJI confirmed, cardiology recs appreciated; no indication for acute closure at the time  - heme consulted for coagulopathy and anemia CT angio w/ bilateral PE. On heparin.  - likely provoked in setting of recent surgery and immobility.  - O2 support as needed  - Pain management as below  - TTE showing possible R to L shunt, REJI confirmed (1/6), cardiology recs appreciated; no indication for acute closure at the time  - heme consulted for coagulopathy and anemia.   - APLS labs are negative can be discharged on a DOAC (anticardiolipin ab positive, IgG however only 15.2 titre, not significant)  -will need 6 months of treatment CT angio w/ bilateral PE. On heparin.  - likely provoked in setting of recent surgery and immobility.  - O2 support as needed  - Pain management as below  - TTE showing possible R to L shunt, REJI confirmed (1/6), cardiology recs appreciated; no indication for acute closure at the time  - heme consulted for coagulopathy and anemia.   - APLS labs are negative can be discharged on a DOAC (anticardiolipin ab positive, IgG however only 15.2 titre, not significant). will f/u with heme regarding AC  -will need 6 months of treatment

## 2021-01-09 NOTE — CHART NOTE - NSCHARTNOTEFT_GEN_A_CORE
Patient is 25F w/ recent tonsillectomy, nonsmoker, using birth control presenting w/ 1 day of SOB, chest pain, back pain, and leg pain w/ no other medical history. In the ED, imaging revealed bilateral DVT w/ high clot burden, bilateral PE, and renal infarct; patient was therapeutically anticoagulated with heparin gtt. Now undergoing hypercoagulable work-up. Called to bedside to assess LLE numbness and heaviness. Physical exam as follows:    VITALS:   T(C): 36.7 (01-09-21 @ 04:58), Max: 38.2 (01-08-21 @ 22:03)  HR: 104 (01-09-21 @ 04:58) (104 - 108)  BP: 133/69 (01-09-21 @ 04:58) (117/75 - 133/69)  RR: 18 (01-09-21 @ 04:58) (18 - 19)  SpO2: 100% (01-09-21 @ 04:58) (100% - 100%)    GENERAL: In mild distress, sitting up in bed  HEAD:  Atraumatic, Normocephalic  ENT: Moist mucous membranes  NECK: Supple, No JVD, some ttp over R. posterolateral aspect of neck  CHEST/LUNG: Clear to auscultation bilaterally; Mildly labored respirations  HEART: tachycardic regular rhythm; No murmurs, rubs, or gallops  ABDOMEN: BSx4; Soft, nontender, nondistended  EXTREMITIES:  2+ Peripheral DP pulses. Lower extremity swelling L>>R, nonpitting edema. Sensation and proprioception intact in LLE. Sensation to warmth intact. Edematous but soft LLE      A/P  Patient is 25F w/ recent tonsillectomy, nonsmoker, using birth control presenting w/ 1 day of SOB, chest pain, back pain, and leg pain w/ no other medical history. In the ED, imaging revealed bilateral DVT w/ high clot burden, bilateral PE, and renal infarct. Called to bedside to evaluate LLE numbness and heaviness with concern for compartment syndrome. While patient is at risk for compartment syndrome, based on neurovascular intact exam above, low suspicion for compartment syndrome at this time.    Plan:  -Continue hep gtt  -Pain control, Oxy 5mg moderate pain, 10mg severe pain  -F/U AM labs  -Serial monitoring of LLE for compartment syndrome  -Urgent vascular c/s if worsening LLE pain, loss of distal pulses or sensation

## 2021-01-09 NOTE — PROGRESS NOTE ADULT - ASSESSMENT
25F with no previous medical history other than tonsilectomy 2 weeks ago presenting with 1 day of back, lower left leg, chest, and arm pain found to have b/l lobar PE, common iliac DVT extending into internal iliac vein, and left lower pole renal infarct.     #B/L Lobar PE  #Common/Internal Iliac Vein DVT   #Left Lower Pole Renal Infarct   #Acute DVT extending from the left calf veins, above the knee, to the level of the left external iliac vein  #PFO    -DVT/PE, likely in the setting of obesity, OCP use, and immobilization after surgery along with possible PFO, but need to r/o catastrophic APLS, myeloproliferative disorder, malignancy  -Please hold hep gtt and put patient on Fondaparinux 10mg subq tonight ([id number])  -Please consult rheumatology to r/o possible catastrophic APLS  -Anticardiolipin positve, LA neg  -Please send  PNH antigen, MICKEY 2, CALR, MPL, BCR/ABL, HIT Ab and ROE  -Please send factor V Leiden, prothrombin gene mutation  -Will need to r/o malignancy, please find out which hospital patient had tonsilectomy, may need to ask pathology to recheck the specimen   -avoid estrogen containing OCP use in the future   -Protein C and S low in the setting of acute clot   -ATIII normal (can also be low whil on heparin)   -TTE with bubble study showing bubbles in LV, possible PFO, REJI confirmed PFO and cardiology f/u  -can follow up at [facility] after discharge      Sharon Gregg MD  Hematology/Oncology Fellow   [phone]   25F with no previous medical history other than tonsilectomy 2 weeks ago presenting with 1 day of back, lower left leg, chest, and arm pain found to have b/l lobar PE, common iliac DVT extending into internal iliac vein, and left lower pole renal infarct.     #B/L Lobar PE  #Common/Internal Iliac Vein DVT   #Left Lower Pole Renal Infarct   #Acute DVT extending from the left calf veins, above the knee, to the level of the left external iliac vein  #PFO    -DVT/PE, likely in the setting of obesity, OCP use, and immobilization after surgery along with possible PFO, but need to r/o catastrophic APLS, myeloproliferative disorder, malignancy  -Please hold hep gtt and put patient on Fondaparinux 10mg subq tonight ([id number])  -Please retest patient for COVID PCR and COVID ABx  -Please consult rheumatology to r/o possible catastrophic APLS  -Anticardiolipin positve, LA neg  -Please send  PNH antigen, MICKEY 2, CALR, MPL, BCR/ABL, HIT Ab and ROE  -Please send factor V Leiden, prothrombin gene mutation  -Will need to r/o malignancy, please find out which hospital patient had tonsilectomy, may need to ask pathology to recheck the specimen   -avoid estrogen containing OCP use in the future   -Protein C and S low in the setting of acute clot   -ATIII normal (can also be low whil on heparin)   -TTE with bubble study showing bubbles in LV, possible PFO, REJI confirmed PFO and cardiology f/u  -can follow up at [facility] after discharge      Sharon Gregg MD  Hematology/Oncology Fellow   [phone]   25F with no previous medical history other than tonsilectomy 2 weeks ago presenting with 1 day of back, lower left leg, chest, and arm pain found to have b/l lobar PE, common iliac DVT extending into internal iliac vein, and left lower pole renal infarct.     #B/L Lobar PE  #Common/Internal Iliac Vein DVT   #Left Lower Pole Renal Infarct   #Acute DVT extending from the left calf veins, above the knee, to the level of the left external iliac vein  #PFO    -DVT/PE, likely in the setting of obesity, OCP use, and immobilization after surgery along with possible PFO, but need to r/o catastrophic APLS, myeloproliferative disorder, malignancy  -Please hold hep gtt and put patient on Fondaparinux 10mg subq tonight ([id number])  -Please retest patient for COVID PCR and COVID ABx  -Please consult rheumatology to r/o possible catastrophic APLS  -Anticardiolipin positve, LA neg  -Please send  PNH antigen, MICKEY 2, CALR, MPL, BCR/ABL, HIT Ab and ROE  -Please send factor V Leiden, prothrombin gene mutation  -please check UA for proteinuria  -Will need to r/o malignancy, please find out which hospital patient had tonsilectomy, may need to ask pathology to recheck the specimen   -avoid estrogen containing OCP use in the future   -Please consult IR/ vascular for possibility of thrombectomy vs TPA  -Protein C and S low in the setting of acute clot   -ATIII normal (can also be low whil on heparin)   -TTE with bubble study showing bubbles in LV, possible PFO, REJI confirmed PFO and cardiology f/u  -can follow up at [facility] after discharge      Sharon Gregg MD  Hematology/Oncology Fellow   [phone]   25F with no previous medical history other than tonsilectomy 2 weeks ago presenting with 1 day of back, lower left leg, chest, and arm pain found to have b/l lobar PE, common iliac DVT extending into internal iliac vein, and left lower pole renal infarct.     #B/L Lobar PE  #Common/Internal Iliac Vein DVT   #Left Lower Pole Renal Infarct   #Acute DVT extending from the left calf veins, above the knee, to the level of the left external iliac vein  #PFO    -DVT/PE, likely in the setting of obesity, OCP use, and immobilization after surgery along with possible PFO, but need to r/o catastrophic APLS, myeloproliferative disorder, malignancy  -Please hold hep gtt and put patient on Fondaparinux 10mg subq tonight ([id number])  -Please retest patient for COVID PCR and COVID ABx  -Please consult rheumatology to r/o possible catastrophic APLS  -Anticardiolipin positve, LA neg  -Please send  PNH antigen, MICKEY 2, NIGHAT, MPL, BCR/ABL, HIT Ab and ROE  -Please send factor V Leiden, prothrombin gene mutation  -please check UA for proteinuria  -Will need to r/o malignancy, please find out which hospital patient had tonsilectomy, may need to ask pathology to recheck the specimen   -avoid estrogen containing OCP use in the future   -Please consult IR/ vascular for possibility of thrombectomy vs TPA  -Protein C and S low in the setting of acute clot   -ATIII normal (can also be low whil on heparin)   -TTE with bubble study showing bubbles in LV, possible PFO, REJI confirmed PFO and cardiology f/u  -can follow up at [facility] after discharge      Sharon Gregg MD  Hematology/Oncology Fellow   [phone]   25F with no previous medical history other than tonsilectomy 2 weeks ago presenting with 1 day of back, lower left leg, chest, and arm pain found to have b/l lobar PE, common iliac DVT extending into internal iliac vein, and left lower pole renal infarct.     #B/L Lobar PE  #Common/Internal Iliac Vein DVT   #Left Lower Pole Renal Infarct   #Acute DVT extending from the left calf veins, above the knee, to the level of the left external iliac vein  #PFO    -DVT/PE, likely in the setting of obesity, OCP use, and immobilization after surgery along with possible PFO, but need to r/o catastrophic APLS given new acute thrombus in lower extremity while anticoagulated with heparin gtt. Other ddx includes myeloproliferative disorder, malignancy, covid.  -Please hold hep gtt and put patient on Fondaparinux 10mg subq tonight ([id number])  -Please retest patient for COVID PCR and COVID ABx  -Please consult rheumatology to r/o possible catastrophic APLS  -Anticardiolipin positive, LA neg  -Please send  PNH antigen, MICKEY 2, NIGHAT, MPL, BCR/ABL, HIT Ab and ROE (doubt any of these)  -Please send factor V Leiden, prothrombin gene mutation  -please check UA for proteinuria  -Will need to r/o malignancy, please find out which hospital patient had tonsillectomy may need to ask pathology to recheck the specimen   -avoid estrogen containing OCP use in the future   -Please consult IR/ vascular for possibility of thrombectomy vs TPA  -Protein C and S low in the setting of acute clot   -ATIII normal (can also be low while on heparin)   -TTE with bubble study showing bubbles in LV, possible PFO, REJI confirmed PFO and cardiology f/u  -will need to follow up at [facility] after discharge      Sharon Gregg MD  Hematology/Oncology Fellow   [phone]

## 2021-01-09 NOTE — PROGRESS NOTE ADULT - PROBLEM SELECTOR PLAN 4
- Tylenol 650mg PRN q6 for mild/moderate pain  - Oxycodone 5 mg Q6H for severe pain  - Revaluate and adjust as needed -standing tylenol 975 q8h  -oxy IR 10 q4h PRN  -oxy IR 5 q4h PRN

## 2021-01-09 NOTE — PROGRESS NOTE ADULT - ASSESSMENT
26y/o with new PE, common iliac DVT and renal infarct.  AC seems failed seen doppler showed extension of DVT.     - Leg more edematous and swollen. Please keep it ACE wrapped and elevated.   - Please Obtain full blood for coagulopathy.   - Vascular surgery will continue to follow up      [id number]

## 2021-01-09 NOTE — PROGRESS NOTE ADULT - SUBJECTIVE AND OBJECTIVE BOX
Patient seen and examined at bedside. c/o of leg swelling.   Pain is well controlled.  Tolerating diet without  nausea or vomiting.      Physical Exam:   General Appearance: Resting comfortably, no acute distress  Left lower extremity swelling seems getting worse. Patient leg was wrapped with ace and elevated.       Vital Signs Last 24 Hrs  T(C): 36.7 (09 Jan 2021 04:58), Max: 38.2 (08 Jan 2021 22:03)  T(F): 98 (09 Jan 2021 04:58), Max: 100.7 (08 Jan 2021 22:03)  HR: 104 (09 Jan 2021 04:58) (104 - 108)  BP: 133/69 (09 Jan 2021 04:58) (117/75 - 133/69)  BP(mean): --  RR: 18 (09 Jan 2021 04:58) (18 - 19)  SpO2: 100% (09 Jan 2021 04:58) (100% - 100%)    I&O's Detail      01-09    131<L>  |  96<L>  |  7   ----------------------------<  91  4.0   |  23  |  0.93    Ca    10.0      09 Jan 2021 08:05  Phos  4.1     01-09  Mg     2.3     01-09    TPro  8.3  /  Alb  3.7  /  TBili  0.6  /  DBili  x   /  AST  39<H>  /  ALT  39<H>  /  AlkPhos  99  01-09                            9.7    13.27 )-----------( 479      ( 09 Jan 2021 08:05 )             32.8       PTT - ( 08 Jan 2021 23:57 )  PTT:69.1 sec    LABS:                         9.7    13.27 )-----------( 479      ( 09 Jan 2021 08:05 )             32.8     01-09    131<L>  |  96<L>  |  7   ----------------------------<  91  4.0   |  23  |  0.93    Ca    10.0      09 Jan 2021 08:05  Phos  4.1     01-09  Mg     2.3     01-09    TPro  8.3  /  Alb  3.7  /  TBili  0.6  /  DBili  x   /  AST  39<H>  /  ALT  39<H>  /  AlkPhos  99  01-09    PTT - ( 08 Jan 2021 23:57 )  PTT:69.1 sec    MEDICATIONS  (STANDING):  acetaminophen   Tablet .. 975 milliGRAM(s) Oral every 8 hours  heparin  Infusion.  Unit(s)/Hr (18 mL/Hr) IV Continuous <Continuous>  influenza   Vaccine 0.5 milliLiter(s) IntraMuscular once    MEDICATIONS  (PRN):  heparin   Injectable 8000 Unit(s) IV Push every 6 hours PRN For aPTT less than 40  heparin   Injectable 4000 Unit(s) IV Push every 6 hours PRN For aPTT between 40 - 57  oxyCODONE    IR 10 milliGRAM(s) Oral every 4 hours PRN Severe Pain (7 - 10)  oxyCODONE    IR 5 milliGRAM(s) Oral every 4 hours PRN Moderate Pain (4 - 6)           Patient seen and examined at bedside. c/o of leg swelling.   Pain is well controlled. Tolerating diet without  nausea or vomiting.      Physical Exam:   General Appearance: Resting comfortably, no acute distress  Left lower extremity swelling seems getting worse. Patient leg was wrapped with ace and elevated.       Vital Signs Last 24 Hrs  T(C): 36.7 (09 Jan 2021 04:58), Max: 38.2 (08 Jan 2021 22:03)  T(F): 98 (09 Jan 2021 04:58), Max: 100.7 (08 Jan 2021 22:03)  HR: 104 (09 Jan 2021 04:58) (104 - 108)  BP: 133/69 (09 Jan 2021 04:58) (117/75 - 133/69)  BP(mean): --  RR: 18 (09 Jan 2021 04:58) (18 - 19)  SpO2: 100% (09 Jan 2021 04:58) (100% - 100%)    I&O's Detail      01-09    131<L>  |  96<L>  |  7   ----------------------------<  91  4.0   |  23  |  0.93    Ca    10.0      09 Jan 2021 08:05  Phos  4.1     01-09  Mg     2.3     01-09    TPro  8.3  /  Alb  3.7  /  TBili  0.6  /  DBili  x   /  AST  39<H>  /  ALT  39<H>  /  AlkPhos  99  01-09                            9.7    13.27 )-----------( 479      ( 09 Jan 2021 08:05 )             32.8       PTT - ( 08 Jan 2021 23:57 )  PTT:69.1 sec    LABS:                         9.7    13.27 )-----------( 479      ( 09 Jan 2021 08:05 )             32.8     01-09    131<L>  |  96<L>  |  7   ----------------------------<  91  4.0   |  23  |  0.93    Ca    10.0      09 Jan 2021 08:05  Phos  4.1     01-09  Mg     2.3     01-09    TPro  8.3  /  Alb  3.7  /  TBili  0.6  /  DBili  x   /  AST  39<H>  /  ALT  39<H>  /  AlkPhos  99  01-09    PTT - ( 08 Jan 2021 23:57 )  PTT:69.1 sec    MEDICATIONS  (STANDING):  acetaminophen   Tablet .. 975 milliGRAM(s) Oral every 8 hours  heparin  Infusion.  Unit(s)/Hr (18 mL/Hr) IV Continuous <Continuous>  influenza   Vaccine 0.5 milliLiter(s) IntraMuscular once    MEDICATIONS  (PRN):  heparin   Injectable 8000 Unit(s) IV Push every 6 hours PRN For aPTT less than 40  heparin   Injectable 4000 Unit(s) IV Push every 6 hours PRN For aPTT between 40 - 57  oxyCODONE    IR 10 milliGRAM(s) Oral every 4 hours PRN Severe Pain (7 - 10)  oxyCODONE    IR 5 milliGRAM(s) Oral every 4 hours PRN Moderate Pain (4 - 6)

## 2021-01-09 NOTE — PROGRESS NOTE ADULT - PROBLEM SELECTOR PLAN 3
Denies dysuria and other renal complaints. Cr 0.93 on admission.  - Pain management as below  - UA on admission w/ bacteria, blood. However, also with many epithelial cells, likely contaminant. No need for antibiotics presently.  - UC sent in ED, f/u results. Denies dysuria and other renal complaints. Cr 0.93 on admission.  - Pain management as below  - UA on admission w/ bacteria, blood. However, also with many epithelial cells, likely contaminant. No need for antibiotics presently.

## 2021-01-09 NOTE — PROGRESS NOTE ADULT - ATTENDING COMMENTS
Patient seen and examined, agree with above. I have edited the note as needed. High suspicion for APLS or other underlying rheum disorder causing clotting--easily missed wince she had the OCP and the recent surgical procedure that could be attributed to the original clot. She had significant clot burden to begin with and will take some time to stop the clotting process. Suggest changing from heparin gtt to fondaparinux for more steady state anticoagulation. She is at high risk for worsening lung function, so please consider asking vascular the possibility of using a retrievable filter to protect her from throwing more clots from her legs. We will continue to monitor and follow-up with you. I have updated the regular hematology team that follows her during the week and we have contacted rheumatology for evaluation.

## 2021-01-10 LAB
ALBUMIN SERPL ELPH-MCNC: 3.8 G/DL — SIGNIFICANT CHANGE UP (ref 3.3–5)
ALP SERPL-CCNC: 105 U/L — SIGNIFICANT CHANGE UP (ref 40–120)
ALT FLD-CCNC: 51 U/L — HIGH (ref 4–33)
ANION GAP SERPL CALC-SCNC: 13 MMOL/L — SIGNIFICANT CHANGE UP (ref 7–14)
APPEARANCE UR: ABNORMAL
APTT BLD: 31.2 SEC — SIGNIFICANT CHANGE UP (ref 27–36.3)
AST SERPL-CCNC: 31 U/L — SIGNIFICANT CHANGE UP (ref 4–32)
BACTERIA # UR AUTO: ABNORMAL
BILIRUB SERPL-MCNC: 0.4 MG/DL — SIGNIFICANT CHANGE UP (ref 0.2–1.2)
BILIRUB UR-MCNC: NEGATIVE — SIGNIFICANT CHANGE UP
BUN SERPL-MCNC: 10 MG/DL — SIGNIFICANT CHANGE UP (ref 7–23)
CALCIUM SERPL-MCNC: 9.6 MG/DL — SIGNIFICANT CHANGE UP (ref 8.4–10.5)
CHLORIDE SERPL-SCNC: 96 MMOL/L — LOW (ref 98–107)
CO2 SERPL-SCNC: 22 MMOL/L — SIGNIFICANT CHANGE UP (ref 22–31)
COLOR SPEC: YELLOW — SIGNIFICANT CHANGE UP
CREAT ?TM UR-MCNC: 434 MG/DL — SIGNIFICANT CHANGE UP
CREAT SERPL-MCNC: 0.97 MG/DL — SIGNIFICANT CHANGE UP (ref 0.5–1.3)
DIFF PNL FLD: NEGATIVE — SIGNIFICANT CHANGE UP
EPI CELLS # UR: SIGNIFICANT CHANGE UP /HPF (ref 0–5)
GLUCOSE BLDC GLUCOMTR-MCNC: 100 MG/DL — HIGH (ref 70–99)
GLUCOSE SERPL-MCNC: 103 MG/DL — HIGH (ref 70–99)
GLUCOSE UR QL: NEGATIVE — SIGNIFICANT CHANGE UP
HBV SURFACE AB SER-ACNC: SIGNIFICANT CHANGE UP
HBV SURFACE AG SER-ACNC: SIGNIFICANT CHANGE UP
HCT VFR BLD CALC: 32.9 % — LOW (ref 34.5–45)
HCV AB S/CO SERPL IA: 0.76 S/CO — SIGNIFICANT CHANGE UP (ref 0–0.99)
HCV AB SERPL-IMP: SIGNIFICANT CHANGE UP
HGB BLD-MCNC: 9.7 G/DL — LOW (ref 11.5–15.5)
KETONES UR-MCNC: ABNORMAL
LEUKOCYTE ESTERASE UR-ACNC: ABNORMAL
MAGNESIUM SERPL-MCNC: 2.1 MG/DL — SIGNIFICANT CHANGE UP (ref 1.6–2.6)
MCHC RBC-ENTMCNC: 20.2 PG — LOW (ref 27–34)
MCHC RBC-ENTMCNC: 29.5 GM/DL — LOW (ref 32–36)
MCV RBC AUTO: 68.4 FL — LOW (ref 80–100)
NITRITE UR-MCNC: NEGATIVE — SIGNIFICANT CHANGE UP
NRBC # BLD: 0 /100 WBCS — SIGNIFICANT CHANGE UP
NRBC # FLD: 0 K/UL — SIGNIFICANT CHANGE UP
PH UR: 6 — SIGNIFICANT CHANGE UP (ref 5–8)
PHOSPHATE SERPL-MCNC: 3.3 MG/DL — SIGNIFICANT CHANGE UP (ref 2.5–4.5)
PLATELET # BLD AUTO: 405 K/UL — HIGH (ref 150–400)
POTASSIUM SERPL-MCNC: 4.2 MMOL/L — SIGNIFICANT CHANGE UP (ref 3.5–5.3)
POTASSIUM SERPL-SCNC: 4.2 MMOL/L — SIGNIFICANT CHANGE UP (ref 3.5–5.3)
PROT ?TM UR-MCNC: 94 MG/DL — SIGNIFICANT CHANGE UP
PROT SERPL-MCNC: 8.5 G/DL — HIGH (ref 6–8.3)
PROT UR-MCNC: ABNORMAL
PROT/CREAT UR-RTO: 0.2 RATIO — SIGNIFICANT CHANGE UP (ref 0–0.2)
RBC # BLD: 4.81 M/UL — SIGNIFICANT CHANGE UP (ref 3.8–5.2)
RBC # FLD: 15.9 % — HIGH (ref 10.3–14.5)
RBC CASTS # UR COMP ASSIST: SIGNIFICANT CHANGE UP /HPF (ref 0–4)
SARS-COV-2 RNA SPEC QL NAA+PROBE: SIGNIFICANT CHANGE UP
SODIUM SERPL-SCNC: 131 MMOL/L — LOW (ref 135–145)
SP GR SPEC: 1.03 — HIGH (ref 1.01–1.02)
UROBILINOGEN FLD QL: ABNORMAL
WBC # BLD: 12.13 K/UL — HIGH (ref 3.8–10.5)
WBC # FLD AUTO: 12.13 K/UL — HIGH (ref 3.8–10.5)
WBC UR QL: SIGNIFICANT CHANGE UP /HPF (ref 0–5)

## 2021-01-10 PROCEDURE — 99233 SBSQ HOSP IP/OBS HIGH 50: CPT | Mod: GC

## 2021-01-10 RX ORDER — FONDAPARINUX SODIUM 2.5 MG/.5ML
7.5 INJECTION, SOLUTION SUBCUTANEOUS DAILY
Refills: 0 | Status: DISCONTINUED | OUTPATIENT
Start: 2021-01-10 | End: 2021-01-12

## 2021-01-10 RX ORDER — LIDOCAINE 4 G/100G
1 CREAM TOPICAL DAILY
Refills: 0 | Status: DISCONTINUED | OUTPATIENT
Start: 2021-01-10 | End: 2021-01-11

## 2021-01-10 RX ADMIN — Medication 975 MILLIGRAM(S): at 06:12

## 2021-01-10 RX ADMIN — LIDOCAINE 1 PATCH: 4 CREAM TOPICAL at 23:41

## 2021-01-10 RX ADMIN — Medication 975 MILLIGRAM(S): at 21:05

## 2021-01-10 RX ADMIN — FONDAPARINUX SODIUM 7.5 MILLIGRAM(S): 2.5 INJECTION, SOLUTION SUBCUTANEOUS at 11:56

## 2021-01-10 RX ADMIN — OXYCODONE HYDROCHLORIDE 10 MILLIGRAM(S): 5 TABLET ORAL at 11:47

## 2021-01-10 RX ADMIN — LIDOCAINE 1 PATCH: 4 CREAM TOPICAL at 11:55

## 2021-01-10 RX ADMIN — OXYCODONE HYDROCHLORIDE 10 MILLIGRAM(S): 5 TABLET ORAL at 03:13

## 2021-01-10 RX ADMIN — LIDOCAINE 1 PATCH: 4 CREAM TOPICAL at 23:42

## 2021-01-10 RX ADMIN — LIDOCAINE 1 PATCH: 4 CREAM TOPICAL at 20:04

## 2021-01-10 RX ADMIN — Medication 975 MILLIGRAM(S): at 13:53

## 2021-01-10 RX ADMIN — OXYCODONE HYDROCHLORIDE 10 MILLIGRAM(S): 5 TABLET ORAL at 18:09

## 2021-01-10 RX ADMIN — LIDOCAINE 1 PATCH: 4 CREAM TOPICAL at 07:14

## 2021-01-10 NOTE — PROGRESS NOTE ADULT - PROBLEM SELECTOR PLAN 2
likely provoked by recent surgery and OCP use.  - Anticoagulated on heparin with therapeutic aptt  - repeat LLE US showed new DVT on hep gtt. Per discussion with heme, wouldn't call it AC failure given patient has been on AC since 1/2. However will leave formal recs  - As per convo with vascular, no urgent intervention needed at this time, c/w medical management. If increased swelling, will call vascular back for thrombectomy.  - Pain management as below  - F/u coagulapathy workup

## 2021-01-10 NOTE — CHART NOTE - NSCHARTNOTEFT_GEN_A_CORE
Patient was complaining of severe L thigh pain. Assessed patient at bedside. Patient was very TTP on L thigh and complained of dec sensation of L thigh. Called Vascular surgery who evaluated patient. Recommended no acute intervention at this time. Gave scheduled dose of Oxy and Lidocaine patch

## 2021-01-10 NOTE — CONSULT NOTE ADULT - ATTENDING COMMENTS
25-year-old woman consulted for VTE (DVT, PE). The patient was on OCP for irregular periods and was mostly lying in bed after having a tonsillectomy surgery. The episode is essentially provoked and does not warrant any thrombophilia w/u. (Some thrombophilia w/u has already been sent. May complete as outpatient if indicated.) Patient will need 3-6 months of anticoagulation.
Pt seen/examined personally. Agree with history and physical as above. Briefly, 24 yo F without significant autoimmune or hypercoagulable personal or family hx, presents with PE, renal thrombosis, and LLE DVT following tonsillectomy, long car ride and recent initiation of OCPs. Consulted for CAPS but given normal APLS labs as well as no end organ damage and overall pt is stable at present, no role for aggressive measures like high dose steroids or plasmapheresis. Agree with sending atypical APLS labs, recommend to stop estrogen containing OCPs and use non-hormonal vs progesterone only OCPs in the future. A/C per Heme.

## 2021-01-10 NOTE — PROGRESS NOTE ADULT - PROBLEM SELECTOR PLAN 5
- PT pending  - Likely home - PT pending  - Likely home  - Will need follow-up with an anticoagulation and thrombosis clinic

## 2021-01-10 NOTE — PROGRESS NOTE ADULT - SUBJECTIVE AND OBJECTIVE BOX
Ced Torres, PGY1  Internal Medicine  Pager #: [phone], [place] [phone]    Patient is a 25y old  Female who presents with a chief complaint of DVT + PE + renal infarct (09 Jan 2021 17:48)      SUBJECTIVE / OVERNIGHT EVENTS:  ADDITIONAL REVIEW OF SYSTEMS:    MEDICATIONS  (STANDING):  acetaminophen   Tablet .. 975 milliGRAM(s) Oral every 8 hours  fondaparinux Injectable 7.5 milliGRAM(s) SubCutaneous daily  influenza   Vaccine 0.5 milliLiter(s) IntraMuscular once  lidocaine   Patch 1 Patch Transdermal daily    MEDICATIONS  (PRN):  oxyCODONE    IR 10 milliGRAM(s) Oral every 4 hours PRN Severe Pain (7 - 10)  oxyCODONE    IR 5 milliGRAM(s) Oral every 4 hours PRN Moderate Pain (4 - 6)      CAPILLARY BLOOD GLUCOSE        I&O's Summary      PHYSICAL EXAM:  Vital Signs Last 24 Hrs  T(C): 36.7 (09 Jan 2021 21:13), Max: 36.8 (09 Jan 2021 13:00)  T(F): 98.1 (09 Jan 2021 21:13), Max: 98.3 (09 Jan 2021 13:00)  HR: 110 (10 Wilian 2021 03:09) (102 - 110)  BP: 133/78 (10 Wilian 2021 03:09) (114/76 - 133/78)  BP(mean): --  RR: 18 (10 Wilian 2021 03:09) (18 - 18)  SpO2: 100% (10 Wilian 2021 03:09) (100% - 100%)  CONSTITUTIONAL: NAD, lying in bed comfortably  EYES: EOMI, PERRLA; conjunctiva and sclera clear  ENMT: Moist oral mucosa; normal dentition  NECK: Supple, no palpable masses  RESPIRATORY: Lungs clear to ascultation b/l; No rales, ronchi, or wheezing; Unlabored respirations  CARDIOVASCULAR: Regular rate and rhythm, normal S1 and S2, no murmurs, rubs, or gallops  ABDOMEN: Soft, nontender, nondistended, normal bowel sounds  MUSCULOSKELETAL: No joint swelling or tenderness to palpation  PSYCH: Affect appropriate  NEUROLOGY: AAOx3, CNs grossly intact  SKIN: No rashes; no palpable lesions    LABS:                        9.7    13.27 )-----------( 479      ( 09 Jan 2021 08:05 )             32.8     01-09    131<L>  |  96<L>  |  7   ----------------------------<  91  4.0   |  23  |  0.93    Ca    10.0      09 Jan 2021 08:05  Phos  4.1     01-09  Mg     2.3     01-09    TPro  8.3  /  Alb  3.7  /  TBili  0.6  /  DBili  x   /  AST  39<H>  /  ALT  39<H>  /  AlkPhos  99  01-09    PTT - ( 08 Jan 2021 23:57 )  PTT:69.1 sec            RADIOLOGY & ADDITIONAL TESTS: Ced Torres, PGY1  Internal Medicine  Pager #: [facility] [phone], [place] [phone]    Patient is a 25y old  Female who presents with a chief complaint of DVT + PE + renal infarct (09 Jan 2021 17:48)      SUBJECTIVE / OVERNIGHT EVENTS: Overnight, patient had significant pain in her left thigh. She received a lidocaine patch for which she says the pain is much improved. Low concern per vascular for compartment syndrome. She has no new acute complaints. Denies fevers/chills, headaches/dizziness, nausea/vomiting, chest pain, SOB, abdominal pain.     MEDICATIONS  (STANDING):  acetaminophen   Tablet .. 975 milliGRAM(s) Oral every 8 hours  fondaparinux Injectable 7.5 milliGRAM(s) SubCutaneous daily  influenza   Vaccine 0.5 milliLiter(s) IntraMuscular once  lidocaine   Patch 1 Patch Transdermal daily    MEDICATIONS  (PRN):  oxyCODONE    IR 10 milliGRAM(s) Oral every 4 hours PRN Severe Pain (7 - 10)  oxyCODONE    IR 5 milliGRAM(s) Oral every 4 hours PRN Moderate Pain (4 - 6)      CAPILLARY BLOOD GLUCOSE        I&O's Summary      PHYSICAL EXAM:  Vital Signs Last 24 Hrs  T(C): 36.7 (09 Jan 2021 21:13), Max: 36.8 (09 Jan 2021 13:00)  T(F): 98.1 (09 Jan 2021 21:13), Max: 98.3 (09 Jan 2021 13:00)  HR: 110 (10 Wilian 2021 03:09) (102 - 110)  BP: 133/78 (10 Wilian 2021 03:09) (114/76 - 133/78)  BP(mean): --  RR: 18 (10 Wilian 2021 03:09) (18 - 18)  SpO2: 100% (10 Wilian 2021 03:09) (100% - 100%)  CONSTITUTIONAL: NAD, lying in bed comfortably  EYES: EOMI, PERRLA; conjunctiva and sclera clear  ENMT: Moist oral mucosa; normal dentition  NECK: Supple, no palpable masses  RESPIRATORY: Lungs clear to ascultation b/l; No rales, ronchi, or wheezing; Unlabored respirations  CARDIOVASCULAR: Regular rate and rhythm, normal S1 and S2, no murmurs, rubs, or gallops  ABDOMEN: Soft, nontender, nondistended, normal bowel sounds  MUSCULOSKELETAL: LLE swelling left > right. DP pulses palpable  PSYCH: Affect appropriate  NEUROLOGY: AAOx3, CNs grossly intact  SKIN: No rashes; no palpable lesions    LABS:                        9.7    13.27 )-----------( 479      ( 09 Jan 2021 08:05 )             32.8     01-09    131<L>  |  96<L>  |  7   ----------------------------<  91  4.0   |  23  |  0.93    Ca    10.0      09 Jan 2021 08:05  Phos  4.1     01-09  Mg     2.3     01-09    TPro  8.3  /  Alb  3.7  /  TBili  0.6  /  DBili  x   /  AST  39<H>  /  ALT  39<H>  /  AlkPhos  99  01-09    PTT - ( 08 Jan 2021 23:57 )  PTT:69.1 sec            RADIOLOGY & ADDITIONAL TESTS:

## 2021-01-10 NOTE — PROGRESS NOTE ADULT - SUBJECTIVE AND OBJECTIVE BOX
Patient seen and examined at bedside. c/o of leg swelling.   Pain is well controlled. Tolerating diet without  nausea or vomiting.      Physical Exam:   General Appearance: Resting comfortably, no acute distress  Left lower extremity significant, stable from yesterday. Sensation intact, tenderness on passive dorsiflexion of LLE. Distal dorsalis pedis palpable, pt palpable. Motor function intact.      Vital Signs Last 24 Hrs  T(C): 36.8 (10 Wilian 2021 12:50), Max: 36.8 (10 Wilian 2021 12:50)  T(F): 98.2 (10 Wilian 2021 12:50), Max: 98.2 (10 Wilian 2021 12:50)  HR: 108 (10 Wilian 2021 12:57) (108 - 125)  BP: 118/73 (10 Wilian 2021 12:50) (118/73 - 133/78)  BP(mean): --  RR: 18 (10 Wilian 2021 12:50) (18 - 18)  SpO2: 100% (10 Wilian 2021 12:50) (100% - 100%)    ----------  LABORATORY DATA:  ----------                        9.7    12.13 )-----------( 405      ( 10 Wilian 2021 07:37 )             32.9               01-10    131<L>  |  96<L>  |  10  ----------------------------<  103<H>  4.2   |  22  |  0.97    Ca    9.6      10 Wilian 2021 07:37  Phos  3.3     01-10  Mg     2.1     01-10    TPro  8.5<H>  /  Alb  3.8  /  TBili  0.4  /  DBili  x   /  AST  31  /  ALT  51<H>  /  AlkPhos  105  01-10    LIVER FUNCTIONS - ( 10 Wilian 2021 07:37 )  Alb: 3.8 g/dL / Pro: 8.5 g/dL / ALK PHOS: 105 U/L / ALT: 51 U/L / AST: 31 U/L / GGT: x           PTT - ( 10 Wilian 2021 07:37 )  PTT:31.2 sec  Urinalysis Basic - ( 10 Wilian 2021 08:00 )    Color: Yellow / Appearance: Slightly Turbid / S.035 / pH: x  Gluc: x / Ketone: Small  / Bili: Negative / Urobili: 12 mg/dL   Blood: x / Protein: 30 mg/dL / Nitrite: Negative   Leuk Esterase: Moderate / RBC: 0-2 /HPF / WBC 6-11 /HPF   Sq Epi: x / Non Sq Epi: 6-10 /HPF / Bacteria: Few        MEDICATIONS  (STANDING):  acetaminophen   Tablet .. 975 milliGRAM(s) Oral every 8 hours  fondaparinux Injectable 7.5 milliGRAM(s) SubCutaneous daily  influenza   Vaccine 0.5 milliLiter(s) IntraMuscular once  lidocaine   Patch 1 Patch Transdermal daily    MEDICATIONS  (PRN):  oxyCODONE    IR 10 milliGRAM(s) Oral every 4 hours PRN Severe Pain (7 - 10)  oxyCODONE    IR 5 milliGRAM(s) Oral every 4 hours PRN Moderate Pain (4 - 6)

## 2021-01-10 NOTE — PROGRESS NOTE ADULT - ASSESSMENT
24y/o with new PE, common iliac DVT and renal infarct.  AC seems failed seen doppler showed extension of DVT.     - Leg more edematous and swollen. Please keep it ACE wrapped and elevated.   - Vascular surgery will continue to follow up    Pavan Tinoco  Vascular Surgery PGY-1  [id number]

## 2021-01-10 NOTE — PROGRESS NOTE ADULT - PROBLEM SELECTOR PLAN 1
CT angio w/ bilateral PE. On heparin.  - likely provoked in setting of recent surgery and immobility.  - O2 support as needed  - Pain management as below  - TTE showing possible R to L shunt, REJI confirmed (1/6), cardiology recs appreciated; no indication for acute closure at the time  - heme consulted for coagulopathy and anemia.   - APLS labs are negative can be discharged on a DOAC (anticardiolipin ab positive, IgG however only 15.2 titre, not significant). will f/u with heme regarding AC  -will need 6 months of treatment CT angio w/ bilateral PE. On heparin.  - likely provoked in setting of recent surgery and immobility.  - O2 support as needed  - Pain management as below  - TTE showing possible R to L shunt, REJI confirmed (1/6), cardiology recs appreciated; no indication for acute closure at the time  - heme consulted for coagulopathy and anemia.   - APLS labs are negative can be discharged on a DOAC (anticardiolipin ab positive, IgG however only 15.2 titre, not significant).  - will need 6 months of treatment  - heme/onc recs appreciated, will f/u and order labs  - rheum recs appreciated, will f/u and order labs

## 2021-01-10 NOTE — PROGRESS NOTE ADULT - PROBLEM SELECTOR PLAN 3
Denies dysuria and other renal complaints. Cr 0.93 on admission.  - Pain management as below  - UA on admission w/ bacteria, blood. However, also with many epithelial cells, likely contaminant. No need for antibiotics presently.

## 2021-01-10 NOTE — CONSULT NOTE ADULT - SUBJECTIVE AND OBJECTIVE BOX
[facility] [place]  [id number]    HISTORY OF PRESENT ILLNESS:    Pt is a 26 yo F with no significant medical history except for tonsillectomy about 3 weeks ago who presented to [facility] ED on  with 1 day history of back, chest, and arm pain. On initial imaging she was found to have b/l lobar PE, common iliac DVT extending into internal iliac vein, and left lower pole renal infarct. pt was started on heparin but now found to have new acute DVT extending from the left calf veins, above the knee, to the level of the left external iliac vein. She also found to have possible PFO on Echo.     Pt reports had an episode of tonsillitis on 2020 and was scheduled for tonsillectomy on mid Dec.  she was not prescribed any antibiotics after tonsillectomy.  she denies any complication, fever, chills s/p sx until she developed severe lower back and leg pain for a day prior to presentation. She then developed chest pain and difficulty catching her breath.     Pt denies any history of fever, chills, weight loss, previous pregnancy/ miscarriage, previous VTE, alopecia, vision disturbance, mouth/ nasal ulcer, rash, joint pain, GI/  complaints, Raynaud's. She denies any family history of autoimmunity or hypercoagulability disorder.   She is a nonsmoker, and uses oral contraceptives for birth control.    On presentation CT scan revealed bilateral common iliac DVT extending into internal illiac, bilateral PE, and renal infarct. Patient given heparin for anticoagulation. but now found to have new acute DVT extending from the left calf veins, above the knee, to the level of the left external iliac vein. She also found to have possible PFO on Echo.     Review of Systems: neg except for what has been documented in HPI    PAST MEDICAL & SURGICAL HISTORY:  No pertinent past medical history    S/P tonsillectomy      MEDICATIONS  (STANDING):  acetaminophen   Tablet .. 975 milliGRAM(s) Oral every 8 hours  fondaparinux Injectable 7.5 milliGRAM(s) SubCutaneous daily  influenza   Vaccine 0.5 milliLiter(s) IntraMuscular once  lidocaine   Patch 1 Patch Transdermal daily    MEDICATIONS  (PRN):  oxyCODONE    IR 10 milliGRAM(s) Oral every 4 hours PRN Severe Pain (7 - 10)  oxyCODONE    IR 5 milliGRAM(s) Oral every 4 hours PRN Moderate Pain (4 - 6)      Allergies    No Known Allergies    Intolerances    PERTINENT MEDICATION HISTORY:  Home Medications:    SOCIAL HISTORY: non smoker, no drug use     FAMILY HISTORY:  No pertinent family history in first degree relative    Vital Signs Last 24 Hrs  T(C): 36.7 (2021 21:13), Max: 36.8 (2021 13:00)  T(F): 98.1 (2021 21:13), Max: 98.3 (2021 13:00)  HR: 110 (10 Wilian 2021 03:09) (102 - 110)  BP: 133/78 (10 Wilian 2021 03:09) (114/76 - 133/78)  BP(mean): --  RR: 18 (10 Wilian 2021 03:09) (18 - 18)  SpO2: 100% (10 Wilian 2021 03:09) (100% - 100%)    Physical Exam:  General: No apparent distress  HEENT: EOMI, MMM  CVS: +S1/S2, RRR, no murmurs/rubs/gallops  Resp: CTA b/l. No crackles/wheezing  GI: Soft, NT/ND +BS  MSK:  Neuro: AAOx3  Skin: no visible rashes    LABS:                        9.7    12.13 )-----------( 405      ( 10 Wilian 2021 07:37 )             32.9     01-10    131<L>  |  96<L>  |  10  ----------------------------<  103<H>  4.2   |  22  |  0.97    Ca    9.6      10 Wilian 2021 07:37  Phos  3.3     01-10  Mg     2.1     01-10    TPro  8.5<H>  /  Alb  3.8  /  TBili  0.4  /  DBili  x   /  AST  31  /  ALT  51<H>  /  AlkPhos  105  01-10    PTT - ( 10 Wilian 2021 07:37 )  PTT:31.2 sec  Urinalysis Basic - ( 10 Wilian 2021 08:00 )    Color: Yellow / Appearance: Slightly Turbid / S.035 / pH: x  Gluc: x / Ketone: Small  / Bili: Negative / Urobili: 12 mg/dL   Blood: x / Protein: 30 mg/dL / Nitrite: Negative   Leuk Esterase: Moderate / RBC: x / WBC x   Sq Epi: x / Non Sq Epi: x / Bacteria: x    protein/Creatinine Ratio Calculation: 0.2 Ratio (01.10.21 @ 08:00)   Beta 2 Glycoprotein 1 Antibody Screen: Negative (21 @ 20:10)     Silica Clotting Time (21 @ 15:03)   Silica Clotting Time S/C Ratio: 0.78: RATIO > 1.33 IS POSITIVE FOR LUPUS.   RATIO <= 1.33 IS NEGATIVE FOR LUPUS. Ratio   Silica Clotting Time Interpretation: Negative: NOTE: The presence of direct thrombin inhibitors (such as Argatroban,   Refludan), UF Heparin >0.5 U/ml or LMW Heparin >1.0 U/ml may affect   Results.    Dilute Pavan's Viper Venom Time (21 @ 15:03)   DRVVT Inhibitor Screen: 30.8: The presence of direct thrombin inhibitors (such as argatroban,   refludan), or direct Xa inhibitors (such as fondaparinux) may cause   false positive results. sec   DRVVT S/C Ratio: Negative   Anticardiolipin IgM, Serum: 3.4: Method:NICKY   Anticardiolipin IgM, Serum: 6.7: Phospholipid Units (APL) or (GPL) or (MPL) Anticardiolipin IgG, Serum: 13.1 GPL (21 @ 15:03)   Cardioliopin Antibody IgA: <5.0 APL (21 @ 10:47)   Anticardiolipin IgG, Serum: 13.1 GPL (21 @ 15:03)   Anticardiolipin IgG, Serum: 15.2 GPL (21 @ 10:47)   Double Stranded DNA Antibody: <12: Method: EIA   Factor V Assay: 68.9: The presence of direct thrombin inhibitors (argatroban, refludan) may   Protein S Free Activity Assay: 26 % (21 @ 04:00)   Protein C Functional Assay with Reflex: 70: Protein C antigen will be performed only when the functional activity is   Antithrombin III Assay with Reflex: 86: The presence of direct thrombin inhibitors   COVID-19 PCR: NotDetec: You can help in the fight against COVID-19. [facility] may contact     RADIOLOGY & ADDITIONAL STUDIES:  < from: CT Abdomen and Pelvis w/ IV Cont (21 @ 01:30) >    EXAM:  CT ANGIO CHEST (W)AW IC      EXAM:  CT ABDOMEN AND PELVIS IC      PROCEDURE DATE:  2021     INTERPRETATION:  CLINICAL INFORMATION: Chest pain. Diffuse upper abdominal pain. Right flank pain.    COMPARISON: None.    PROCEDURE:  CT Angiography of the Chest was performed followed by portal venous phase imaging of the Abdomen and Pelvis.  IV Contrast: 90 ml of Omnipaque 350 was injected intravenously. 10 ml were discarded.  Oral contrast: None.  Sagittal and coronal reformats were performed as well as 3D (MIP) reconstructions.    FINDINGS:  CHEST:  LUNGS AND LARGE AIRWAYS: Patent central airways. Left lower lobe wedge-shaped peripheral groundglass opacity with areas of central lucency, likely infarct.  PLEURA: No pleural effusion.  VESSELS: Right upper lobe lobar pulmonary embolism extending into the segmental branches. Right lower lobe lobar pulmonary embolism extending into the segmental branches. Left lower lobe lobar pulmonary embolism extending into the segmental branches.  HEART: Heart size is normal. No pericardial effusion. Right heart is not enlarged. MEDIASTINUM AND STACI: No lymphadenopathy.  CHEST WALL AND LOWER NECK: Heterogeneous bilateral thyroid glands.    ABDOMEN AND PELVIS:  LIVER: Within normal limits.  BILE DUCTS: Normal caliber.  GALLBLADDER: Cholelithiasis.  SPLEEN: Within normal limits.  PANCREAS: Within normal limits.  ADRENALS: Within normal limits.  KIDNEYS/URETERS: Wedge-shaped left lower pole renal hypodensity, likely infarct. No stonesor hydronephrosis    BLADDER: Within normal limits.  REPRODUCTIVE ORGANS: Uterus and adnexa within normal limits.    BOWEL: No bowel obstruction. Appendix is normal.  PERITONEUM: No ascites.  VESSELS: Bilateral common iliac DVT with possible extension into the bilateral internal iliac veins.  RETROPERITONEUM/LYMPH NODES: No lymphadenopathy.  ABDOMINAL WALL: Within normal limits.  BONES: Within normal limits.    IMPRESSION:  Bilateral lobar pulmonary embolism extending into the segmental branches with left lower lobe pulmonary infarct, as above.    Bilateral common iliac DVT extending into the bilateral internal iliac veins.    Left lower pole renal infarct.    Cholelithiasis without cholecystitis.    Comment: Coexistence of pulmonary embolism/infarct and renal infarct may suggest the presence of right to left shunt. Further evaluation with echocardiogram is recommended.    Findings were discussed with Dr. JAZZ WILLIS 2021 1:43 AM by Dr. Betancourt with read back confirmation.      TRENT PENALOZA; Resident Interventional Radiology  This document has been electronically signed.  PRICE EDMOND MD; Attending Radiologist  This document has been electronically signed. 2021  2:59AM    < end of copied text >    < from: US Duplex Venous Lower Ext Complete, Bilateral (21 @ 18:14) >    EXAM:  US DPLX LWR EXT VEINS COMPL BI        PROCEDURE DATE:  2021         INTERPRETATION:  CLINICAL INFORMATION: 24-year-old with bilateral PE.    COMPARISON: US lower extremity 2021.    TECHNIQUE: Duplex sonography of the BILATERAL LOWER extremity veins with color and spectral Doppler, with and without compression.    FINDINGS:    There is acute thrombus extending from the left calf veins through the left external iliac vein. There is noncompressibility of the veins of the left lower extremity. No flow is seen on Doppler exam.    No acute DVT in the right lower extremity.    IMPRESSION:  Acute DVT extending from the left calf veins, above the knee, to the level of the left external iliac vein.  Dr. Sales discussed these findings with MD Marcelino on 2021 6:19 PM .      CHRISTEN SALES MD; Resident Radiology  This document has been electronically signed.  LITA DUVAL MD; Attending Radiologist  This document has been electronically signed. 2021  8:08AM    < end of copied text >  < from: US Duplex Venous Lower Ext Ltd, Left (21 @ 02:52) >  EXAM:  US DPLX LWR EXT VEINS LTD LT        PROCEDURE DATE:  2021       INTERPRETATION:  CLINICAL INFORMATION: Left lower extremity swelling and pain    COMPARISON: CT abdomen pelvis.    TECHNIQUE: Duplex sonography of the LEFT LOWER extremity veins with color and spectral Doppler, with and without compression.    FINDINGS:    There is normal compressibility of the left common femoral, femoral and popliteal veins.  The contralateral common femoral vein is patent.    Absent respiratory variation in the left common femoral vein    No calf vein thrombosis is detected.    IMPRESSION:  No evidence of left lower extremity deep venous thrombosis below the inguinal ligament.    Absent respiratory variation in the left common femoral vein may be secondary to left common iliac vein DVT seen on CT abdomen pelvis from same day    < end of copied text >      < from: Transesophageal Echocardiogram w/o TTE (21 @ 17:33) >  CONCLUSIONS:  1. Normal left atrium. No left atrial or left atrial  appendage thrombus.  2. Normal left ventricular internal dimensions and wall  thicknesses.  3. Normal left ventricular systolic function. No segmental  wall motion abnormalities.  4. Normal right ventricular size and function.  5. Agitated saline injection demonstrates evidence of a  patent foramen ovale with right to left shunt after  Valsalva maneuver.    < end of copied text >   [facility]  [id number]    HISTORY OF PRESENT ILLNESS:    Pt is a 24 yo F with no significant medical history except for tonsillectomy about 3 weeks ago who presented to [facility] ED on  with 1 day history of back, chest, and arm pain. On initial imaging she was found to have b/l lobar PE, common iliac DVT extending into internal iliac vein, and left lower pole renal infarct. pt was started on heparin but now found to have new acute DVT extending from the left calf veins, above the knee, to the level of the left external iliac vein. She also found to have possible PFO on Echo.     Pt reports had an episode of tonsillitis on 2020 and was scheduled for tonsillectomy on mid Dec.  she was not prescribed any antibiotics after tonsillectomy.  she denies any complication, fever, chills s/p sx until she developed severe lower back and leg pain for a day prior to presentation. She then developed chest pain and difficulty catching her breath.     Pt denies any history of fever, chills, weight loss, previous pregnancy/ miscarriage, previous VTE, alopecia, vision disturbance, mouth/ nasal ulcer, rash, joint pain, GI/  complaints, Raynaud's. She denies any family history of autoimmunity or hypercoagulability disorder. However reports was recently started on OCP ( x 2 months) and had a trip with car to [place]. She is a nonsmoker.    On presentation CT scan revealed bilateral common iliac DVT extending into internal illiac, bilateral PE, and renal infarct. Patient given heparin for anticoagulation. but now found to have new acute DVT extending from the left calf veins, above the knee, to the level of the left external iliac vein. She also found to have possible PFO on Echo.     Review of Systems: neg except for what has been documented in HPI    PAST MEDICAL & SURGICAL HISTORY:  No pertinent past medical history    S/P tonsillectomy      MEDICATIONS  (STANDING):  acetaminophen   Tablet .. 975 milliGRAM(s) Oral every 8 hours  fondaparinux Injectable 7.5 milliGRAM(s) SubCutaneous daily  influenza   Vaccine 0.5 milliLiter(s) IntraMuscular once  lidocaine   Patch 1 Patch Transdermal daily    MEDICATIONS  (PRN):  oxyCODONE    IR 10 milliGRAM(s) Oral every 4 hours PRN Severe Pain (7 - 10)  oxyCODONE    IR 5 milliGRAM(s) Oral every 4 hours PRN Moderate Pain (4 - 6)      Allergies    No Known Allergies    Intolerances    PERTINENT MEDICATION HISTORY:  Home Medications:    SOCIAL HISTORY: non smoker, no drug use     FAMILY HISTORY:  Mother has RA     Vital Signs Last 24 Hrs  T(C): 36.7 (2021 21:13), Max: 36.8 (2021 13:00)  T(F): 98.1 (2021 21:13), Max: 98.3 (2021 13:00)  HR: 110 (10 Wilian 2021 03:09) (102 - 110)  BP: 133/78 (10 Wilian 2021 03:09) (114/76 - 133/78)  BP(mean): --  RR: 18 (10 Wilian 2021 03:09) (18 - 18)  SpO2: 100% (10 Wilian 2021 03:09) (100% - 100%)    Physical Exam:  General: No apparent distress  GI: Soft  MSK: no synovitis , LLE swelling non tender   Neuro: AAOx3  Skin: no visible rashes, no levido    LABS:                        9.7    12.13 )-----------( 405      ( 10 Wilian 2021 07:37 )             32.9     01-10    131<L>  |  96<L>  |  10  ----------------------------<  103<H>  4.2   |  22  |  0.97    Ca    9.6      10 Wilian 2021 07:37  Phos  3.3     01-10  Mg     2.1     01-10    TPro  8.5<H>  /  Alb  3.8  /  TBili  0.4  /  DBili  x   /  AST  31  /  ALT  51<H>  /  AlkPhos  105  01-10    PTT - ( 10 Wilian 2021 07:37 )  PTT:31.2 sec  Urinalysis Basic - ( 10 Wilian 2021 08:00 )    Color: Yellow / Appearance: Slightly Turbid / S.035 / pH: x  Gluc: x / Ketone: Small  / Bili: Negative / Urobili: 12 mg/dL   Blood: x / Protein: 30 mg/dL / Nitrite: Negative   Leuk Esterase: Moderate / RBC: x / WBC x   Sq Epi: x / Non Sq Epi: x / Bacteria: x    protein/Creatinine Ratio Calculation: 0.2 Ratio (01.10.21 @ 08:00)   Beta 2 Glycoprotein 1 Antibody Screen: Negative (21 @ 20:10)     Silica Clotting Time (21 @ 15:03)   Silica Clotting Time S/C Ratio: 0.78: RATIO > 1.33 IS POSITIVE FOR LUPUS.   RATIO <= 1.33 IS NEGATIVE FOR LUPUS. Ratio   Silica Clotting Time Interpretation: Negative: NOTE: The presence of direct thrombin inhibitors (such as Argatroban,   Refludan), UF Heparin >0.5 U/ml or LMW Heparin >1.0 U/ml may affect   Results.    Dilute Pavan's Viper Venom Time (21 @ 15:03)   DRVVT Inhibitor Screen: 30.8: The presence of direct thrombin inhibitors (such as argatroban,   refludan), or direct Xa inhibitors (such as fondaparinux) may cause   false positive results. sec   DRVVT S/C Ratio: Negative   Anticardiolipin IgM, Serum: 3.4: Method:NICKY   Anticardiolipin IgM, Serum: 6.7: Phospholipid Units (APL) or (GPL) or (MPL) Anticardiolipin IgG, Serum: 13.1 GPL (21 @ 15:03)   Cardioliopin Antibody IgA: <5.0 APL (21 @ 10:47)   Anticardiolipin IgG, Serum: 13.1 GPL (21 @ 15:03)   Anticardiolipin IgG, Serum: 15.2 GPL (21 @ 10:47)   Double Stranded DNA Antibody: <12: Method: EIA   Factor V Assay: 68.9: The presence of direct thrombin inhibitors (argatroban, refludan) may   Protein S Free Activity Assay: 26 % (21 @ 04:00)   Protein C Functional Assay with Reflex: 70: Protein C antigen will be performed only when the functional activity is   Antithrombin III Assay with Reflex: 86: The presence of direct thrombin inhibitors   COVID-19 PCR: NotDetec: You can help in the fight against COVID-19. [facility] may contact     RADIOLOGY & ADDITIONAL STUDIES:  < from: CT Abdomen and Pelvis w/ IV Cont (21 @ 01:30) >    EXAM:  CT ANGIO CHEST (W)AW IC      EXAM:  CT ABDOMEN AND PELVIS IC      PROCEDURE DATE:  2021     INTERPRETATION:  CLINICAL INFORMATION: Chest pain. Diffuse upper abdominal pain. Right flank pain.    COMPARISON: None.    PROCEDURE:  CT Angiography of the Chest was performed followed by portal venous phase imaging of the Abdomen and Pelvis.  IV Contrast: 90 ml of Omnipaque 350 was injected intravenously. 10 ml were discarded.  Oral contrast: None.  Sagittal and coronal reformats were performed as well as 3D (MIP) reconstructions.    FINDINGS:  CHEST:  LUNGS AND LARGE AIRWAYS: Patent central airways. Left lower lobe wedge-shaped peripheral groundglass opacity with areas of central lucency, likely infarct.  PLEURA: No pleural effusion.  VESSELS: Right upper lobe lobar pulmonary embolism extending into the segmental branches. Right lower lobe lobar pulmonary embolism extending into the segmental branches. Left lower lobe lobar pulmonary embolism extending into the segmental branches.  HEART: Heart size is normal. No pericardial effusion. Right heart is not enlarged. MEDIASTINUM AND STACI: No lymphadenopathy.  CHEST WALL AND LOWER NECK: Heterogeneous bilateral thyroid glands.    ABDOMEN AND PELVIS:  LIVER: Within normal limits.  BILE DUCTS: Normal caliber.  GALLBLADDER: Cholelithiasis.  SPLEEN: Within normal limits.  PANCREAS: Within normal limits.  ADRENALS: Within normal limits.  KIDNEYS/URETERS: Wedge-shaped left lower pole renal hypodensity, likely infarct. No stonesor hydronephrosis    BLADDER: Within normal limits.  REPRODUCTIVE ORGANS: Uterus and adnexa within normal limits.    BOWEL: No bowel obstruction. Appendix is normal.  PERITONEUM: No ascites.  VESSELS: Bilateral common iliac DVT with possible extension into the bilateral internal iliac veins.  RETROPERITONEUM/LYMPH NODES: No lymphadenopathy.  ABDOMINAL WALL: Within normal limits.  BONES: Within normal limits.    IMPRESSION:  Bilateral lobar pulmonary embolism extending into the segmental branches with left lower lobe pulmonary infarct, as above.    Bilateral common iliac DVT extending into the bilateral internal iliac veins.    Left lower pole renal infarct.    Cholelithiasis without cholecystitis.    Comment: Coexistence of pulmonary embolism/infarct and renal infarct may suggest the presence of right to left shunt. Further evaluation with echocardiogram is recommended.    Findings were discussed with Dr. JAZZ WILLIS 2021 1:43 AM by Dr. Betancourt with read back confirmation.      TRENT PENALOZA; Resident Interventional Radiology  This document has been electronically signed.  PRICE EDMOND MD; Attending Radiologist  This document has been electronically signed. 2021  2:59AM    < end of copied text >    < from: US Duplex Venous Lower Ext Complete, Bilateral (21 @ 18:14) >    EXAM:  US DPLX LWR EXT VEINS COMPL BI        PROCEDURE DATE:  2021         INTERPRETATION:  CLINICAL INFORMATION: 24-year-old with bilateral PE.    COMPARISON: US lower extremity 2021.    TECHNIQUE: Duplex sonography of the BILATERAL LOWER extremity veins with color and spectral Doppler, with and without compression.    FINDINGS:    There is acute thrombus extending from the left calf veins through the left external iliac vein. There is noncompressibility of the veins of the left lower extremity. No flow is seen on Doppler exam.    No acute DVT in the right lower extremity.    IMPRESSION:  Acute DVT extending from the left calf veins, above the knee, to the level of the left external iliac vein.  Dr. Sales discussed these findings with MD Marcelino on 2021 6:19 PM .      CHRISTEN SALES MD; Resident Radiology  This document has been electronically signed.  LITA DUVAL MD; Attending Radiologist  This document has been electronically signed. 2021  8:08AM    < end of copied text >  < from: US Duplex Venous Lower Ext Ltd, Left (21 @ 02:52) >  EXAM:  US DPLX LWR EXT VEINS LTD LT        PROCEDURE DATE:  2021       INTERPRETATION:  CLINICAL INFORMATION: Left lower extremity swelling and pain    COMPARISON: CT abdomen pelvis.    TECHNIQUE: Duplex sonography of the LEFT LOWER extremity veins with color and spectral Doppler, with and without compression.    FINDINGS:    There is normal compressibility of the left common femoral, femoral and popliteal veins.  The contralateral common femoral vein is patent.    Absent respiratory variation in the left common femoral vein    No calf vein thrombosis is detected.    IMPRESSION:  No evidence of left lower extremity deep venous thrombosis below the inguinal ligament.    Absent respiratory variation in the left common femoral vein may be secondary to left common iliac vein DVT seen on CT abdomen pelvis from same day    < end of copied text >      < from: Transesophageal Echocardiogram w/o TTE (21 @ 17:33) >  CONCLUSIONS:  1. Normal left atrium. No left atrial or left atrial  appendage thrombus.  2. Normal left ventricular internal dimensions and wall  thicknesses.  3. Normal left ventricular systolic function. No segmental  wall motion abnormalities.  4. Normal right ventricular size and function.  5. Agitated saline injection demonstrates evidence of a  patent foramen ovale with right to left shunt after  Valsalva maneuver.    < end of copied text >   [facility]  [id number]    HISTORY OF PRESENT ILLNESS:    Pt is a 26 yo F with no significant medical history except for tonsillectomy about 3 weeks ago for recurrent infectious tonsillitis who presented to [facility] ED on  with 1 day history of back, chest, and arm pain. On initial imaging she was found to have b/l lobar PE, common iliac DVT extending into internal iliac vein, and left lower pole renal infarct. pt was started on heparin but now found to have new acute DVT extending from the left calf veins, above the knee, to the level of the left external iliac vein. She also found to have possible PFO on Echo.     Pt reports had an episode of tonsillitis on 2020 and was scheduled for tonsillectomy on mid Dec.  she was not prescribed any antibiotics after tonsillectomy.  she denies any complication, fever, chills s/p sx until she developed severe lower back and leg pain for a day prior to presentation. She then developed chest pain and difficulty catching her breath.     Pt denies any history of fever, chills, weight loss, previous pregnancy/ miscarriage, previous VTE, alopecia, vision disturbance, mouth/ nasal ulcer, rash, joint pain, GI/  complaints, Raynaud's. She denies any family history of autoimmunity or hypercoagulability disorder. However reports was recently started on OCP ( x 2 months) and had a trip with car to [place]. She is a nonsmoker.    On presentation CT scan revealed bilateral common iliac DVT extending into internal illiac, bilateral PE, and renal infarct. Patient given heparin for anticoagulation. but now found to have new acute DVT extending from the left calf veins, above the knee, to the level of the left external iliac vein. She also found to have possible PFO on Echo.     Review of Systems: neg except for what has been documented in HPI    PAST MEDICAL & SURGICAL HISTORY:  No pertinent past medical history    S/P tonsillectomy    MEDICATIONS  (STANDING):  acetaminophen   Tablet .. 975 milliGRAM(s) Oral every 8 hours  fondaparinux Injectable 7.5 milliGRAM(s) SubCutaneous daily  influenza   Vaccine 0.5 milliLiter(s) IntraMuscular once  lidocaine   Patch 1 Patch Transdermal daily    MEDICATIONS  (PRN):  oxyCODONE    IR 10 milliGRAM(s) Oral every 4 hours PRN Severe Pain (7 - 10)  oxyCODONE    IR 5 milliGRAM(s) Oral every 4 hours PRN Moderate Pain (4 - 6)      Allergies    No Known Allergies    Intolerances    PERTINENT MEDICATION HISTORY:  Home Medications:    SOCIAL HISTORY: non smoker, no drug use     FAMILY HISTORY:  Mother has RA     Vital Signs Last 24 Hrs  T(C): 36.7 (2021 21:13), Max: 36.8 (2021 13:00)  T(F): 98.1 (2021 21:13), Max: 98.3 (2021 13:00)  HR: 110 (10 Wilian 2021 03:09) (102 - 110)  BP: 133/78 (10 Wilian 2021 03:09) (114/76 - 133/78)  RR: 18 (10 Wilian 2021 03:09) (18 - 18)  SpO2: 100% (10 Wilian 2021 03:09) (100% - 100%)    Physical Exam:  General: No apparent distress  HEENT: EOMI, MMM, no oral ulcers, no salivary gland fullness  Resp: breathing comfortably, speaking in full sentences    GI: Soft  MSK: no synovitis , LLE swelling over thigh, distal LLE wrapped in ACE wrap, non tender at present, + lidocaine patch. RLE normal   Neuro: AAOx3  Skin: no visible rashes, no livedo    LABS:                        9.7    12.13 )-----------( 405      ( 10 Wilian 2021 07:37 )             32.9     01-10    131<L>  |  96<L>  |  10  ----------------------------<  103<H>  4.2   |  22  |  0.97    Ca    9.6      10 Wilian 2021 07:37  Phos  3.3     01-10  Mg     2.1     01-10    TPro  8.5<H>  /  Alb  3.8  /  TBili  0.4  /  DBili  x   /  AST  31  /  ALT  51<H>  /  AlkPhos  105  01-10    PTT - ( 10 Wilian 2021 07:37 )  PTT:31.2 sec  Urinalysis Basic - ( 10 Wilian 2021 08:00 )    Color: Yellow / Appearance: Slightly Turbid / S.035 / pH: x  Gluc: x / Ketone: Small  / Bili: Negative / Urobili: 12 mg/dL   Blood: x / Protein: 30 mg/dL / Nitrite: Negative   Leuk Esterase: Moderate / RBC: x / WBC x   Sq Epi: x / Non Sq Epi: x / Bacteria: x    protein/Creatinine Ratio Calculation: 0.2 Ratio (01.10.21 @ 08:00)   Beta 2 Glycoprotein 1 Antibody Screen: Negative (21 @ 20:10)     Silica Clotting Time (21 @ 15:03)   Silica Clotting Time S/C Ratio: 0.78: RATIO > 1.33 IS POSITIVE FOR LUPUS.   RATIO <= 1.33 IS NEGATIVE FOR LUPUS. Ratio   Silica Clotting Time Interpretation: Negative: NOTE: The presence of direct thrombin inhibitors (such as Argatroban,   Refludan), UF Heparin >0.5 U/ml or LMW Heparin >1.0 U/ml may affect   Results.    Dilute Pavan's Viper Venom Time (21 @ 15:03)   DRVVT Inhibitor Screen: 30.8: The presence of direct thrombin inhibitors (such as argatroban,   refludan), or direct Xa inhibitors (such as fondaparinux) may cause   false positive results. sec   DRVVT S/C Ratio: Negative   Anticardiolipin IgM, Serum: 3.4: Method:NICKY   Anticardiolipin IgM, Serum: 6.7: Phospholipid Units (APL) or (GPL) or (MPL) Anticardiolipin IgG, Serum: 13.1 GPL (21 @ 15:03)   Cardioliopin Antibody IgA: <5.0 APL (21 @ 10:47)   Anticardiolipin IgG, Serum: 13.1 GPL (21 @ 15:03)   Anticardiolipin IgG, Serum: 15.2 GPL (21 @ 10:47)   Double Stranded DNA Antibody: <12: Method: EIA   Factor V Assay: 68.9: The presence of direct thrombin inhibitors (argatroban, refludan) may   Protein S Free Activity Assay: 26 % (21 @ 04:00)   Protein C Functional Assay with Reflex: 70: Protein C antigen will be performed only when the functional activity is   Antithrombin III Assay with Reflex: 86: The presence of direct thrombin inhibitors   COVID-19 PCR: NotDetec: You can help in the fight against COVID-19. [facility] may contact     RADIOLOGY & ADDITIONAL STUDIES:  < from: CT Abdomen and Pelvis w/ IV Cont (21 @ 01:30) >    EXAM:  CT ANGIO CHEST (W)AW IC      EXAM:  CT ABDOMEN AND PELVIS IC      PROCEDURE DATE:  2021     INTERPRETATION:  CLINICAL INFORMATION: Chest pain. Diffuse upper abdominal pain. Right flank pain.    COMPARISON: None.    PROCEDURE:  CT Angiography of the Chest was performed followed by portal venous phase imaging of the Abdomen and Pelvis.  IV Contrast: 90 ml of Omnipaque 350 was injected intravenously. 10 ml were discarded.  Oral contrast: None.  Sagittal and coronal reformats were performed as well as 3D (MIP) reconstructions.    FINDINGS:  CHEST:  LUNGS AND LARGE AIRWAYS: Patent central airways. Left lower lobe wedge-shaped peripheral groundglass opacity with areas of central lucency, likely infarct.  PLEURA: No pleural effusion.  VESSELS: Right upper lobe lobar pulmonary embolism extending into the segmental branches. Right lower lobe lobar pulmonary embolism extending into the segmental branches. Left lower lobe lobar pulmonary embolism extending into the segmental branches.  HEART: Heart size is normal. No pericardial effusion. Right heart is not enlarged. MEDIASTINUM AND STACI: No lymphadenopathy.  CHEST WALL AND LOWER NECK: Heterogeneous bilateral thyroid glands.    ABDOMEN AND PELVIS:  LIVER: Within normal limits.  BILE DUCTS: Normal caliber.  GALLBLADDER: Cholelithiasis.  SPLEEN: Within normal limits.  PANCREAS: Within normal limits.  ADRENALS: Within normal limits.  KIDNEYS/URETERS: Wedge-shaped left lower pole renal hypodensity, likely infarct. No stonesor hydronephrosis    BLADDER: Within normal limits.  REPRODUCTIVE ORGANS: Uterus and adnexa within normal limits.    BOWEL: No bowel obstruction. Appendix is normal.  PERITONEUM: No ascites.  VESSELS: Bilateral common iliac DVT with possible extension into the bilateral internal iliac veins.  RETROPERITONEUM/LYMPH NODES: No lymphadenopathy.  ABDOMINAL WALL: Within normal limits.  BONES: Within normal limits.    IMPRESSION:  Bilateral lobar pulmonary embolism extending into the segmental branches with left lower lobe pulmonary infarct, as above.    Bilateral common iliac DVT extending into the bilateral internal iliac veins.    Left lower pole renal infarct.    Cholelithiasis without cholecystitis.    Comment: Coexistence of pulmonary embolism/infarct and renal infarct may suggest the presence of right to left shunt. Further evaluation with echocardiogram is recommended.    Findings were discussed with Dr. JAZZ WILLIS 2021 1:43 AM by Dr. Betancourt with read back confirmation.      TRENT PENALOZA; Resident Interventional Radiology  This document has been electronically signed.  PRICE EDMOND MD; Attending Radiologist  This document has been electronically signed. 2021  2:59AM    < end of copied text >    < from: US Duplex Venous Lower Ext Complete, Bilateral (21 @ 18:14) >    EXAM:  US DPLX LWR EXT VEINS COMPL BI        PROCEDURE DATE:  2021         INTERPRETATION:  CLINICAL INFORMATION: 24-year-old with bilateral PE.    COMPARISON: US lower extremity 2021.    TECHNIQUE: Duplex sonography of the BILATERAL LOWER extremity veins with color and spectral Doppler, with and without compression.    FINDINGS:    There is acute thrombus extending from the left calf veins through the left external iliac vein. There is noncompressibility of the veins of the left lower extremity. No flow is seen on Doppler exam.    No acute DVT in the right lower extremity.    IMPRESSION:  Acute DVT extending from the left calf veins, above the knee, to the level of the left external iliac vein.  Dr. Sales discussed these findings with MD Marcelino on 2021 6:19 PM .      CHRISTEN SALES MD; Resident Radiology  This document has been electronically signed.  LITA DUVAL MD; Attending Radiologist  This document has been electronically signed. 2021  8:08AM    < end of copied text >  < from: US Duplex Venous Lower Ext Ltd, Left (21 @ 02:52) >  EXAM:  US DPLX LWR EXT VEINS LTD LT        PROCEDURE DATE:  2021       INTERPRETATION:  CLINICAL INFORMATION: Left lower extremity swelling and pain    COMPARISON: CT abdomen pelvis.    TECHNIQUE: Duplex sonography of the LEFT LOWER extremity veins with color and spectral Doppler, with and without compression.    FINDINGS:    There is normal compressibility of the left common femoral, femoral and popliteal veins.  The contralateral common femoral vein is patent.    Absent respiratory variation in the left common femoral vein    No calf vein thrombosis is detected.    IMPRESSION:  No evidence of left lower extremity deep venous thrombosis below the inguinal ligament.    Absent respiratory variation in the left common femoral vein may be secondary to left common iliac vein DVT seen on CT abdomen pelvis from same day    < end of copied text >      < from: Transesophageal Echocardiogram w/o TTE (21 @ 17:33) >  CONCLUSIONS:  1. Normal left atrium. No left atrial or left atrial  appendage thrombus.  2. Normal left ventricular internal dimensions and wall  thicknesses.  3. Normal left ventricular systolic function. No segmental  wall motion abnormalities.  4. Normal right ventricular size and function.  5. Agitated saline injection demonstrates evidence of a  patent foramen ovale with right to left shunt after  Valsalva maneuver.    < end of copied text >

## 2021-01-10 NOTE — PROGRESS NOTE ADULT - ATTENDING COMMENTS
Pt with persistent LLE pain related to large thrombus. Now more comfortable s/p analgesia. Appreciate vascular, heme, and rheum follow-up.

## 2021-01-10 NOTE — CONSULT NOTE ADULT - ASSESSMENT
Pt is a 26 yo F with no significant medical history except for tonsillectomy 2 weeks ago who presented to [facility] ED on 1/1 with 1 day history of back,  chest, and arm pain. On initial imaging she was found to have b/l lobar PE, common iliac DVT extending into internal iliac vein, and left lower pole renal infarct. pt was started on heparin but now found to have new acute DVT extending from the left calf veins, above the knee, to the level of the left external iliac vein. She also found to have possible PFO on Echo.     Impression:   # extensive thromboembolic events likely in the setting of obesity, OCP and immobilization s/p tonsillectomy, DDX; APS, malignancy, infection  One should consider APS as a differential in a young female with extensive VTE. However, given negative typical APLs ( Lupus Anticoagulant, Anticardiolipin Ab: only Ig G slightly elevated to 15 once, Beta 2 glycoprotein ) , no evidence of organ failure, and no other clinical evidence for any underlying autoimmune disease we have low suspicion for APS/ CAPS at this time.     Recommend:   - Please send Atypical APLs including PSPT, Phosphatidylserine, Phosphatidylinositol, Phosphatidylethanolamine ( send out tests. please send with requisition from ), complement cascade, and urine pro/cr , QuantiFeron Hep B and C Abs  - C/w AC as per Hem: switched from heparin to Fondaparinux  - infection and malignancy w/u as primary team   - please resend COVID swab  - Follow with vascular for extensive LLE DVT    Rheum will follow     Case was discussed with Dr. Aaron Bateman MD  Rheum fellow PGY 5  Pager [phone]/ [phone].     24 yo F with no significant medical history except for tonsillectomy 3 weeks ago who presented to [facility] ED on 1/1 with 1 day history of back,  chest, and arm pain. On initial imaging she was found to have b/l lobar PE, common iliac DVT extending into internal iliac vein, and left lower pole renal infarct. pt was started on heparin but now found to have new acute DVT extending from the left calf veins, above the knee, to the level of the left external iliac vein. She also found to have PFO on REJI. Rheumatology was called concerned for APS/ CAPS.    Impression:   # extensive thromboembolic events likely in the setting of obesity, OCP and immobilization s/p tonsillectomy, DDX; APS, malignancy, infection  One should consider APS as a differential in a young female with extensive VTE. However, given negative typical APLs ( Lupus Anticoagulant, Anticardiolipin Ab: only Ig G slightly elevated to 15 once, Beta 2 glycoprotein ) , no evidence of organ failure, and no other clinical evidence for any underlying autoimmune disease we have low suspicion for APS/ CAPS at this time.     Recommend:   - Please send Atypical APLs including PSPT, Phosphatidylserine, Phosphatidylinositol, Phosphatidylethanolamine ( send out tests. please send with requisition from ), complement cascade, and urine pro/cr , QuantiFeron Hep B and C Abs  - C/w AC as per Hem: switched from heparin to Fondaparinux  - infection and malignancy w/u as primary team   - please resend COVID swab  - Follow with vascular for extensive LLE DVT    Rheum will follow     Case was discussed with Dr. Aaron Bateman MD  Rheum fellow PGY 5  Pager [phone]/ [phone].     26 yo F with no significant medical history except for tonsillectomy 3 weeks ago who presented to [facility] ED on 1/1 with 1 day history of back,  chest, and arm pain. On initial imaging she was found to have b/l lobar PE, common iliac DVT extending into internal iliac vein, and left lower pole renal infarct. pt was started on heparin but now found to have new acute DVT extending from the left calf veins, above the knee, to the level of the left external iliac vein. She also found to have PFO on REJI. Rheumatology was called concerned for APS/ CAPS.    Impression:   # extensive thromboembolic events likely in the setting of obesity, OCP and immobilization s/p tonsillectomy, DDX; APS, malignancy, infection  One should consider APS as a differential in a young female with extensive VTE. However, given negative typical APLs ( Lupus Anticoagulant, Anticardiolipin Ab: only Ig G slightly elevated to 15 once, Beta 2 glycoprotein ) , no evidence of organ failure, no thrombocytopenia or hemolytic anemia and no other clinical evidence for any underlying autoimmune disease we have low suspicion for APS/ CAPS at this time.     Recommend:   - Please send Atypical APLs including PSPT, Phosphatidylserine, Phosphatidylinositol, Phosphatidylethanolamine ( send out tests. please send with requisition from ), complement cascade, and urine pro/cr , QuantiFeron Hep B and C Abs  - C/w AC as per Hem: switched from heparin to Fondaparinux  - infection and malignancy w/u as primary team   - please resend COVID swab  - Follow with vascular for extensive LLE DVT    Rheum will follow     Case was discussed with Dr. Aaron Bateman MD  Rheum fellow PGY 5  Pager [phone]/ [phone].     26 yo F with no significant medical history except for tonsillectomy 3 weeks ago who presented to [facility] ED on 1/1 with 1 day history of back,  chest, and arm pain. On initial imaging she was found to have b/l lobar PE, common iliac DVT extending into internal iliac vein, and left lower pole renal infarct. pt was started on heparin but now found to have new acute DVT extending from the left calf veins, above the knee, to the level of the left external iliac vein. She also found to have PFO on REJI. Rheumatology was called concerned for APS/ CAPS.    Impression:   # extensive thromboembolic events likely in the setting of obesity, recently initiated OCP and immobilization  Other DDX include APS, malignancy, infection  One should consider APS as a differential in a young female with extensive VTE. However, given negative typical APLs ( Lupus Anticoagulant, Anticardiolipin Ab: only Ig G slightly elevated to 15 once, Beta 2 glycoprotein ) , no evidence of organ failure, no thrombocytopenia or hemolytic anemia and no other clinical evidence for any underlying autoimmune disease we have low suspicion for APS/ CAPS at this time.     Recommend:   - Please send Atypical APLs including PSPT, Phosphatidylserine, Phosphatidylinositol, Phosphatidylethanolamine ( send out tests. please send with requisition from ), complement cascade, and urine pro/cr , QuantiFeron Hep B and C Abs  - C/w AC as per Hem  - infection and malignancy w/u as primary team   - please resend COVID swab  - Follow with vascular for extensive LLE DVT    Rheum will follow     Case was discussed with Dr. Pineda Bateman MD  Rheum fellow PGY 5  Pager [phone]/ [phone].     24 yo F with no significant medical history except for tonsillectomy 3 weeks ago who presented to [facility] ED on 1/1 with 1 day history of back,  chest, and arm pain. On initial imaging she was found to have b/l lobar PE, common iliac DVT extending into internal iliac vein, and left lower pole renal infarct. pt was started on heparin but now found to have new acute DVT extending from the left calf veins, above the knee, to the level of the left external iliac vein. She also found to have PFO on REJI. Rheumatology was called concerned for APS/ CAPS.    Impression:   # extensive thromboembolic events likely in the setting of obesity, recently initiated OCP and immobilization  Other DDX include APS, malignancy, infection  One should consider APS as a differential in a young female with extensive VTE. However, given negative typical APLs ( Lupus Anticoagulant, Anticardiolipin Ab: only Ig G slightly elevated to 15 once, Beta 2 glycoprotein ) , no evidence of organ failure, no thrombocytopenia or hemolytic anemia and no other clinical evidence for any underlying autoimmune disease we have low suspicion for APS/ CAPS at this time.     Recommend:   - Please send Atypical APLs including PSPT, Phosphatidylserine, Phosphatidylinositol, Phosphatidylethanolamine ( send out tests. please send with requisition from ), and urine pro/cr , QuantiFeron Hep B and C Abs -- in case she needs steroids   - C/w AC as per Heme  - infection and malignancy w/u as primary team   - please resend COVID swab  - Follow with vascular for extensive LLE DVT    Rheum will follow   Case was discussed with Dr. Pineda Bateman MD  Rheum fellow PGY 5  Pager [phone]/ [phone].

## 2021-01-10 NOTE — PROGRESS NOTE ADULT - ASSESSMENT
25 y.o. F w/ a hx of recent tonsillectomy, on OCP's p/w SOB, CP, Back pain, and leg pain found to have extensive VTE (bilateral common iliac DVT, b/l PE w/ pulmonary and renal infarct) currently on Heparin gtt.

## 2021-01-11 LAB
ALBUMIN SERPL ELPH-MCNC: 3.4 G/DL — SIGNIFICANT CHANGE UP (ref 3.3–5)
ALP SERPL-CCNC: 100 U/L — SIGNIFICANT CHANGE UP (ref 40–120)
ALT FLD-CCNC: 36 U/L — HIGH (ref 4–33)
ANION GAP SERPL CALC-SCNC: 14 MMOL/L — SIGNIFICANT CHANGE UP (ref 7–14)
AST SERPL-CCNC: 20 U/L — SIGNIFICANT CHANGE UP (ref 4–32)
BILIRUB SERPL-MCNC: 0.5 MG/DL — SIGNIFICANT CHANGE UP (ref 0.2–1.2)
BUN SERPL-MCNC: 10 MG/DL — SIGNIFICANT CHANGE UP (ref 7–23)
C3 SERPL-MCNC: 210 MG/DL — HIGH (ref 90–180)
C4 SERPL-MCNC: 38 MG/DL — SIGNIFICANT CHANGE UP (ref 10–40)
CALCIUM SERPL-MCNC: 9.4 MG/DL — SIGNIFICANT CHANGE UP (ref 8.4–10.5)
CHLORIDE SERPL-SCNC: 97 MMOL/L — LOW (ref 98–107)
CO2 SERPL-SCNC: 22 MMOL/L — SIGNIFICANT CHANGE UP (ref 22–31)
CREAT SERPL-MCNC: 0.84 MG/DL — SIGNIFICANT CHANGE UP (ref 0.5–1.3)
GLUCOSE SERPL-MCNC: 95 MG/DL — SIGNIFICANT CHANGE UP (ref 70–99)
HCT VFR BLD CALC: 30.9 % — LOW (ref 34.5–45)
HEPARIN-PF4 AB RESULT: 0.43 — HIGH (ref 0–0.39)
HGB BLD-MCNC: 9.2 G/DL — LOW (ref 11.5–15.5)
MAGNESIUM SERPL-MCNC: 2.1 MG/DL — SIGNIFICANT CHANGE UP (ref 1.6–2.6)
MCHC RBC-ENTMCNC: 20.2 PG — LOW (ref 27–34)
MCHC RBC-ENTMCNC: 29.8 GM/DL — LOW (ref 32–36)
MCV RBC AUTO: 67.9 FL — LOW (ref 80–100)
NRBC # BLD: 0 /100 WBCS — SIGNIFICANT CHANGE UP
NRBC # FLD: 0 K/UL — SIGNIFICANT CHANGE UP
PF4 HEPARIN CMPLX AB SER-ACNC: POSITIVE — SIGNIFICANT CHANGE UP
PHOSPHATE SERPL-MCNC: 3.2 MG/DL — SIGNIFICANT CHANGE UP (ref 2.5–4.5)
PLATELET # BLD AUTO: 387 K/UL — SIGNIFICANT CHANGE UP (ref 150–400)
POTASSIUM SERPL-MCNC: 4.3 MMOL/L — SIGNIFICANT CHANGE UP (ref 3.5–5.3)
POTASSIUM SERPL-SCNC: 4.3 MMOL/L — SIGNIFICANT CHANGE UP (ref 3.5–5.3)
PROT SERPL-MCNC: 8 G/DL — SIGNIFICANT CHANGE UP (ref 6–8.3)
RBC # BLD: 4.55 M/UL — SIGNIFICANT CHANGE UP (ref 3.8–5.2)
RBC # FLD: 15.7 % — HIGH (ref 10.3–14.5)
SARS-COV-2 IGG SERPL QL IA: NEGATIVE — SIGNIFICANT CHANGE UP
SARS-COV-2 IGM SERPL IA-ACNC: <0.1 INDEX — SIGNIFICANT CHANGE UP
SODIUM SERPL-SCNC: 133 MMOL/L — LOW (ref 135–145)
WBC # BLD: 11.08 K/UL — HIGH (ref 3.8–10.5)
WBC # FLD AUTO: 11.08 K/UL — HIGH (ref 3.8–10.5)

## 2021-01-11 PROCEDURE — G0452: CPT | Mod: 26

## 2021-01-11 PROCEDURE — 99233 SBSQ HOSP IP/OBS HIGH 50: CPT | Mod: GC

## 2021-01-11 RX ORDER — LIDOCAINE 4 G/100G
1 CREAM TOPICAL DAILY
Refills: 0 | Status: DISCONTINUED | OUTPATIENT
Start: 2021-01-11 | End: 2021-01-11

## 2021-01-11 RX ORDER — LIDOCAINE 4 G/100G
1 CREAM TOPICAL
Refills: 0 | Status: DISCONTINUED | OUTPATIENT
Start: 2021-01-11 | End: 2021-01-11

## 2021-01-11 RX ORDER — POLYETHYLENE GLYCOL 3350 17 G/17G
17 POWDER, FOR SOLUTION ORAL
Refills: 0 | Status: DISCONTINUED | OUTPATIENT
Start: 2021-01-11 | End: 2021-01-14

## 2021-01-11 RX ORDER — LIDOCAINE 4 G/100G
1 CREAM TOPICAL DAILY
Refills: 0 | Status: DISCONTINUED | OUTPATIENT
Start: 2021-01-11 | End: 2021-01-14

## 2021-01-11 RX ORDER — SENNA PLUS 8.6 MG/1
1 TABLET ORAL DAILY
Refills: 0 | Status: DISCONTINUED | OUTPATIENT
Start: 2021-01-11 | End: 2021-01-14

## 2021-01-11 RX ADMIN — POLYETHYLENE GLYCOL 3350 17 GRAM(S): 17 POWDER, FOR SOLUTION ORAL at 21:21

## 2021-01-11 RX ADMIN — OXYCODONE HYDROCHLORIDE 10 MILLIGRAM(S): 5 TABLET ORAL at 00:30

## 2021-01-11 RX ADMIN — LIDOCAINE 1 PATCH: 4 CREAM TOPICAL at 12:28

## 2021-01-11 RX ADMIN — OXYCODONE HYDROCHLORIDE 10 MILLIGRAM(S): 5 TABLET ORAL at 09:33

## 2021-01-11 RX ADMIN — FONDAPARINUX SODIUM 7.5 MILLIGRAM(S): 2.5 INJECTION, SOLUTION SUBCUTANEOUS at 12:29

## 2021-01-11 RX ADMIN — LIDOCAINE 1 PATCH: 4 CREAM TOPICAL at 19:22

## 2021-01-11 RX ADMIN — OXYCODONE HYDROCHLORIDE 10 MILLIGRAM(S): 5 TABLET ORAL at 14:49

## 2021-01-11 RX ADMIN — Medication 975 MILLIGRAM(S): at 21:21

## 2021-01-11 RX ADMIN — Medication 975 MILLIGRAM(S): at 05:27

## 2021-01-11 RX ADMIN — SENNA PLUS 1 TABLET(S): 8.6 TABLET ORAL at 14:49

## 2021-01-11 RX ADMIN — Medication 975 MILLIGRAM(S): at 12:29

## 2021-01-11 RX ADMIN — LIDOCAINE 1 PATCH: 4 CREAM TOPICAL at 12:29

## 2021-01-11 RX ADMIN — LIDOCAINE 1 PATCH: 4 CREAM TOPICAL at 07:35

## 2021-01-11 RX ADMIN — POLYETHYLENE GLYCOL 3350 17 GRAM(S): 17 POWDER, FOR SOLUTION ORAL at 12:28

## 2021-01-11 RX ADMIN — LIDOCAINE 1 PATCH: 4 CREAM TOPICAL at 12:45

## 2021-01-11 RX ADMIN — OXYCODONE HYDROCHLORIDE 10 MILLIGRAM(S): 5 TABLET ORAL at 19:09

## 2021-01-11 RX ADMIN — OXYCODONE HYDROCHLORIDE 10 MILLIGRAM(S): 5 TABLET ORAL at 23:19

## 2021-01-11 NOTE — PROGRESS NOTE ADULT - SUBJECTIVE AND OBJECTIVE BOX
PROGRESS NOTE:   Evon Yanes MD   Internal Medicine PGY-1  [place] [phone] / [phone]    Patient is a 25y old  Female who presents with a chief complaint of DVT + PE + renal infarct (10 Wilian 2021 16:50)    SUBJECTIVE / OVERNIGHT EVENTS: Overnight patient complained of LLE pain.    ADDITIONAL REVIEW OF SYSTEMS:  CONSTITUTIONAL: No weakness, fevers or chills  EYES/ENT: No throat pain   NECK: No pain or stiffness  RESPIRATORY: No cough, wheezing, shortness of breath  CARDIOVASCULAR: No chest pain or palpitations  GASTROINTESTINAL: No abdominal pain; nausea, vomiting;  diarrhea or constipation  GENITOURINARY: No dysuria, frequency  NEUROLOGICAL: No numbness or weakness  SKIN: No rashes    MEDICATIONS  (STANDING):  acetaminophen   Tablet .. 975 milliGRAM(s) Oral every 8 hours  fondaparinux Injectable 7.5 milliGRAM(s) SubCutaneous daily  influenza   Vaccine 0.5 milliLiter(s) IntraMuscular once  lidocaine   Patch 1 Patch Transdermal daily  lidocaine   Patch 1 Patch Transdermal daily    MEDICATIONS  (PRN):  oxyCODONE    IR 10 milliGRAM(s) Oral every 4 hours PRN Severe Pain (7 - 10)  oxyCODONE    IR 5 milliGRAM(s) Oral every 4 hours PRN Moderate Pain (4 - 6)    CAPILLARY BLOOD GLUCOSE  POCT Blood Glucose.: 100 mg/dL (10 Wilian 2021 08:25)    PHYSICAL EXAM:  Vital Signs Last 24 Hrs  T(C): 36.7 (2021 05:23), Max: 36.8 (10 Wilian 2021 12:50)  T(F): 98.1 (2021 05:23), Max: 98.2 (10 Wilian 2021 12:50)  HR: 124 (2021 05:23) (108 - 125)  BP: 108/58 (2021 05:23) (108/58 - 118/73)  RR: 18 (2021 05:23) (18 - 18)  SpO2: 98% (2021 05:23) (98% - 100%)    CONSTITUTIONAL: NAD, lying in bed comfortably  EYES: EOMI, PERRLA; conjunctiva and sclera clear  ENMT: Moist oral mucosa; normal dentition  NECK: Supple, no palpable masses  RESPIRATORY: Lungs clear to ascultation b/l; No rales, rhonchi, or wheezing; Unlabored respirations  CARDIOVASCULAR: Regular rate and rhythm, normal S1 and S2, no murmurs, rubs, or gallops  ABDOMEN: Soft, nontender, nondistended, normal bowel sounds  MUSCULOSKELETAL: LLE swelling left > right. DP pulses palpable  PSYCH: Affect appropriate  NEUROLOGY: AAOx3, CNs grossly intact  SKIN: No rashes; no palpable lesions    LABS:             9.7    12.13 )-----------( 405      ( 10 Wilian 2021 07:37 )             32.9     01-10    131<L>  |  96<L>  |  10  ----------------------------<  103<H>  4.2   |  22  |  0.97    Ca    9.6      10 Wilian 2021 07:37  Phos  3.3     10  Mg     2.1     10    TPro  8.5<H>  /  Alb  3.8  /  TBili  0.4  /  DBili  x   /  AST  31  /  ALT  51<H>  /  AlkPhos  105  01-10    PTT - ( 10 Wilian 2021 07:37 )  PTT:31.2 sec    Urinalysis Basic - ( 10 Wilian 2021 08:00 )  Color: Yellow / Appearance: Slightly Turbid / S.035 / pH: x  Gluc: x / Ketone: Small  / Bili: Negative / Urobili: 12 mg/dL   Blood: x / Protein: 30 mg/dL / Nitrite: Negative   Leuk Esterase: Moderate / RBC: 0-2 /HPF / WBC 6-11 /HPF   Sq Epi: x / Non Sq Epi: 6-10 /HPF / Bacteria: Few    Culture - Blood (collected 2021 12:44)  Source: .Blood Blood  Preliminary Report (10 Wilian 2021 13:01):    No growth to date.    Culture - Blood (collected 2021 12:44)  Source: .Blood Blood  Preliminary Report (10 Wilian 2021 13:01):    No growth to date.    RADIOLOGY & ADDITIONAL TESTS:  Results Reviewed:  Imaging Personally Reviewed:  Electrocardiogram Personally Reviewed:    COORDINATION OF CARE:  Care Discussed with Consultants/Other Providers [Y/N]:   Prior or Outpatient Records Reviewed [Y/N]:    PROGRESS NOTE:   Evon Yanes MD   Internal Medicine PGY-1  [place] [phone] / [phone]    Patient is a 25y old  Female who presents with a chief complaint of DVT + PE + renal infarct (10 Wilian 2021 16:50)    SUBJECTIVE / OVERNIGHT EVENTS: Overnight patient complained of LLE pain. Patient seen in AM, complaining of LLE throbbing pain improved by lidocaine.     ADDITIONAL REVIEW OF SYSTEMS:  CONSTITUTIONAL: +Fatigue. No weakness, fevers or chills  NECK: No pain or stiffness  RESPIRATORY: No shortness of breath  CARDIOVASCULAR: No chest pain or palpitations  GASTROINTESTINAL: No abdominal pain; nausea, vomiting;  diarrhea. +constipation  GENITOURINARY: No dysuria  NEUROLOGICAL: No numbness or weakness  SKIN: No rashes    MEDICATIONS  (STANDING):  acetaminophen   Tablet .. 975 milliGRAM(s) Oral every 8 hours  fondaparinux Injectable 7.5 milliGRAM(s) SubCutaneous daily  influenza   Vaccine 0.5 milliLiter(s) IntraMuscular once  lidocaine   Patch 1 Patch Transdermal daily  lidocaine   Patch 1 Patch Transdermal daily    MEDICATIONS  (PRN):  oxyCODONE    IR 10 milliGRAM(s) Oral every 4 hours PRN Severe Pain (7 - 10)  oxyCODONE    IR 5 milliGRAM(s) Oral every 4 hours PRN Moderate Pain (4 - 6)    CAPILLARY BLOOD GLUCOSE  POCT Blood Glucose.: 100 mg/dL (10 Wilian 2021 08:25)    PHYSICAL EXAM:  Vital Signs Last 24 Hrs  T(C): 36.7 (2021 05:23), Max: 36.8 (10 Wilian 2021 12:50)  T(F): 98.1 (2021 05:23), Max: 98.2 (10 Wilian 2021 12:50)  HR: 124 (2021 05:23) (108 - 125)  BP: 108/58 (2021 05:23) (108/58 - 118/73)  RR: 18 (2021 05:23) (18 - 18)  SpO2: 98% (2021 05:23) (98% - 100%)    CONSTITUTIONAL: NAD, lying in bed comfortably  EYES: EOMI, conjunctiva and sclera clear  ENMT: Moist oral mucosa; normal dentition  NECK: Supple, no palpable masses  RESPIRATORY: Lungs clear to ascultation b/l; No rales, rhonchi, or wheezing; Unlabored respirations  CARDIOVASCULAR: +tachycardic, Regular rate and rhythm, normal S1 and S2, no murmurs, rubs, or gallops  ABDOMEN: Soft, nontender, nondistended, normal bowel sounds  MUSCULOSKELETAL: LLE swelling left > right. DP pulses palpable  PSYCH: Affect appropriate  NEUROLOGY: AAOx3, CNs grossly intact  SKIN: No rashes; no palpable lesions    LABS:             9.7    12.13 )-----------( 405      ( 10 Wilian 2021 07:37 )             32.9     01-10    131<L>  |  96<L>  |  10  ----------------------------<  103<H>  4.2   |  22  |  0.97    Ca    9.6      10 Wilian 2021 07:37  Phos  3.3     01-10  Mg     2.1     01-10    TPro  8.5<H>  /  Alb  3.8  /  TBili  0.4  /  DBili  x   /  AST  31  /  ALT  51<H>  /  AlkPhos  105  01-10    PTT - ( 10 Wilian 2021 07:37 )  PTT:31.2 sec    Urinalysis Basic - ( 10 Wilian 2021 08:00 )  Color: Yellow / Appearance: Slightly Turbid / S.035 / pH: x  Gluc: x / Ketone: Small  / Bili: Negative / Urobili: 12 mg/dL   Blood: x / Protein: 30 mg/dL / Nitrite: Negative   Leuk Esterase: Moderate / RBC: 0-2 /HPF / WBC 6-11 /HPF   Sq Epi: x / Non Sq Epi: 6-10 /HPF / Bacteria: Few    Culture - Blood (collected 2021 12:44)  Source: .Blood Blood  Preliminary Report (10 Wilian 2021 13:01):    No growth to date.    Culture - Blood (collected 2021 12:44)  Source: .Blood Blood  Preliminary Report (10 Wilian 2021 13:01):    No growth to date.    RADIOLOGY & ADDITIONAL TESTS:  Results Reviewed:  Imaging Personally Reviewed:  Electrocardiogram Personally Reviewed:    COORDINATION OF CARE:  Care Discussed with Consultants/Other Providers [Y/N]:   Prior or Outpatient Records Reviewed [Y/N]:    PROGRESS NOTE:   Evon Yanes MD   Internal Medicine PGY-1  [place] [phone] / [phone]    Patient is a 25y old  Female who presents with a chief complaint of DVT + PE + renal infarct (10 Wilian 2021 16:50)    SUBJECTIVE / OVERNIGHT EVENTS: Overnight patient complained of LLE pain. Patient seen in AM, complaining of LLE throbbing pain improved by lidocaine.     ADDITIONAL REVIEW OF SYSTEMS:  CONSTITUTIONAL: +Fatigue. No weakness, fevers or chills  NECK: No pain or stiffness  RESPIRATORY: No shortness of breath  CARDIOVASCULAR: No chest pain or palpitations  GASTROINTESTINAL: No abdominal pain; nausea, vomiting;  diarrhea. +constipation  GENITOURINARY: No dysuria  NEUROLOGICAL: No numbness or weakness  SKIN: No rashes    MEDICATIONS  (STANDING):  acetaminophen   Tablet .. 975 milliGRAM(s) Oral every 8 hours  fondaparinux Injectable 7.5 milliGRAM(s) SubCutaneous daily  influenza   Vaccine 0.5 milliLiter(s) IntraMuscular once  lidocaine   Patch 1 Patch Transdermal daily  lidocaine   Patch 1 Patch Transdermal daily    MEDICATIONS  (PRN):  oxyCODONE    IR 10 milliGRAM(s) Oral every 4 hours PRN Severe Pain (7 - 10)  oxyCODONE    IR 5 milliGRAM(s) Oral every 4 hours PRN Moderate Pain (4 - 6)    CAPILLARY BLOOD GLUCOSE  POCT Blood Glucose.: 100 mg/dL (10 Wilian 2021 08:25)    PHYSICAL EXAM:  Vital Signs Last 24 Hrs  T(C): 36.7 (2021 05:23), Max: 36.8 (10 Wilian 2021 12:50)  T(F): 98.1 (2021 05:23), Max: 98.2 (10 Wilian 2021 12:50)  HR: 124 (2021 05:23) (108 - 125)  BP: 108/58 (2021 05:23) (108/58 - 118/73)  RR: 18 (2021 05:23) (18 - 18)  SpO2: 98% (2021 05:23) (98% - 100%)    CONSTITUTIONAL: NAD, lying in bed comfortably  EYES: EOMI, conjunctiva and sclera clear  ENMT: Moist oral mucosa; normal dentition  NECK: Supple, no palpable masses  RESPIRATORY: Lungs clear to ascultation b/l; No rales, rhonchi, or wheezing; Unlabored respirations  CARDIOVASCULAR: +tachycardic, Regular rate and rhythm, normal S1 and S2, no murmurs, rubs, or gallops  ABDOMEN: Soft, nontender, nondistended, normal bowel sounds  MUSCULOSKELETAL: LLE swelling left > right. DP pulses palpable  PSYCH: Tearful at times  NEUROLOGY: AAOx3, CNs grossly intact  SKIN: No rashes; no palpable lesions    LABS:             9.7    12.13 )-----------( 405      ( 10 Wilian 2021 07:37 )             32.9     01-10    131<L>  |  96<L>  |  10  ----------------------------<  103<H>  4.2   |  22  |  0.97    Ca    9.6      10 Wilian 2021 07:37  Phos  3.3     01-10  Mg     2.1     01-10    TPro  8.5<H>  /  Alb  3.8  /  TBili  0.4  /  DBili  x   /  AST  31  /  ALT  51<H>  /  AlkPhos  105  01-10    PTT - ( 10 Wilian 2021 07:37 )  PTT:31.2 sec    Urinalysis Basic - ( 10 Wilian 2021 08:00 )  Color: Yellow / Appearance: Slightly Turbid / S.035 / pH: x  Gluc: x / Ketone: Small  / Bili: Negative / Urobili: 12 mg/dL   Blood: x / Protein: 30 mg/dL / Nitrite: Negative   Leuk Esterase: Moderate / RBC: 0-2 /HPF / WBC 6-11 /HPF   Sq Epi: x / Non Sq Epi: 6-10 /HPF / Bacteria: Few    Culture - Blood (collected 2021 12:44)  Source: .Blood Blood  Preliminary Report (10 Wilian 2021 13:01):    No growth to date.    Culture - Blood (collected 2021 12:44)  Source: .Blood Blood  Preliminary Report (10 Wilian 2021 13:01):    No growth to date.    RADIOLOGY & ADDITIONAL TESTS:  Results Reviewed: Y  Imaging Personally Reviewed: Y  Electrocardiogram Personally Reviewed: Y    COORDINATION OF CARE:  Care Discussed with Consultants/Other Providers [Y/N]: Y   Prior or Outpatient Records Reviewed [Y/N]: Y

## 2021-01-11 NOTE — PROGRESS NOTE ADULT - SUBJECTIVE AND OBJECTIVE BOX
INTERVAL HPI/OVERNIGHT EVENTS:  Patient S&E at bedside. No o/n events,     VITAL SIGNS:  T(F): 98.1 (21 @ 05:23)  HR: 124 (21 @ 05:23)  BP: 108/58 (21 @ 05:23)  RR: 18 (21 @ 05:23)  SpO2: 98% (21 @ 05:23)  Wt(kg): --    PHYSICAL EXAM:    Constitutional: NAD  Eyes: EOMI, sclera non-icteric  Neck: supple  Respiratory: CTAB, no wheezes or crackles   Cardiovascular: RRR  Gastrointestinal: soft, NTND, + BS  Extremities: no cyanosis, clubbing or edema   Neurological: awake and alert      MEDICATIONS  (STANDING):  acetaminophen   Tablet .. 975 milliGRAM(s) Oral every 8 hours  fondaparinux Injectable 7.5 milliGRAM(s) SubCutaneous daily  influenza   Vaccine 0.5 milliLiter(s) IntraMuscular once  lidocaine   Patch 1 Patch Transdermal daily  lidocaine   Patch 1 Patch Transdermal daily  polyethylene glycol 3350 17 Gram(s) Oral two times a day  senna 1 Tablet(s) Oral daily    MEDICATIONS  (PRN):  oxyCODONE    IR 10 milliGRAM(s) Oral every 4 hours PRN Severe Pain (7 - 10)  oxyCODONE    IR 5 milliGRAM(s) Oral every 4 hours PRN Moderate Pain (4 - 6)      Allergies    No Known Allergies    Intolerances        LABS:                        9.2    11.08 )-----------( 387      ( 2021 07:00 )             30.9         133<L>  |  97<L>  |  10  ----------------------------<  95  4.3   |  22  |  0.84    Ca    9.4      2021 07:00  Phos  3.2       Mg     2.1         TPro  8.0  /  Alb  3.4  /  TBili  0.5  /  DBili  x   /  AST  20  /  ALT  36<H>  /  AlkPhos  100      PTT - ( 10 Wilian 2021 07:37 )  PTT:31.2 sec  Urinalysis Basic - ( 10 Wilian 2021 08:00 )    Color: Yellow / Appearance: Slightly Turbid / S.035 / pH: x  Gluc: x / Ketone: Small  / Bili: Negative / Urobili: 12 mg/dL   Blood: x / Protein: 30 mg/dL / Nitrite: Negative   Leuk Esterase: Moderate / RBC: 0-2 /HPF / WBC 6-11 /HPF   Sq Epi: x / Non Sq Epi: 6-10 /HPF / Bacteria: Few        RADIOLOGY & ADDITIONAL TESTS:  Studies reviewed.   INTERVAL HPI/OVERNIGHT EVENTS:  Patient S&E at bedside. No o/n events. States pain in her leg is improving.     VITAL SIGNS:  T(F): 98.1 (21 @ 05:23)  HR: 124 (21 @ 05:23)  BP: 108/58 (21 @ 05:23)  RR: 18 (21 @ 05:23)  SpO2: 98% (21 @ 05:23)  Wt(kg): --    PHYSICAL EXAM:    Constitutional: NAD  Eyes: EOMI, sclera non-icteric  Neck: supple  Respiratory: CTAB, no wheezes or crackles   Cardiovascular: RRR  Gastrointestinal: soft, NTND, + BS  Extremities: no cyanosis, clubbing or edema   Neurological: awake and alert      MEDICATIONS  (STANDING):  acetaminophen   Tablet .. 975 milliGRAM(s) Oral every 8 hours  fondaparinux Injectable 7.5 milliGRAM(s) SubCutaneous daily  influenza   Vaccine 0.5 milliLiter(s) IntraMuscular once  lidocaine   Patch 1 Patch Transdermal daily  lidocaine   Patch 1 Patch Transdermal daily  polyethylene glycol 3350 17 Gram(s) Oral two times a day  senna 1 Tablet(s) Oral daily    MEDICATIONS  (PRN):  oxyCODONE    IR 10 milliGRAM(s) Oral every 4 hours PRN Severe Pain (7 - 10)  oxyCODONE    IR 5 milliGRAM(s) Oral every 4 hours PRN Moderate Pain (4 - 6)      Allergies    No Known Allergies    Intolerances        LABS:                        9.2    11.08 )-----------( 387      ( 2021 07:00 )             30.9         133<L>  |  97<L>  |  10  ----------------------------<  95  4.3   |  22  |  0.84    Ca    9.4      2021 07:00  Phos  3.2       Mg     2.1         TPro  8.0  /  Alb  3.4  /  TBili  0.5  /  DBili  x   /  AST  20  /  ALT  36<H>  /  AlkPhos  100      PTT - ( 10 Wilian 2021 07:37 )  PTT:31.2 sec  Urinalysis Basic - ( 10 Wilian 2021 08:00 )    Color: Yellow / Appearance: Slightly Turbid / S.035 / pH: x  Gluc: x / Ketone: Small  / Bili: Negative / Urobili: 12 mg/dL   Blood: x / Protein: 30 mg/dL / Nitrite: Negative   Leuk Esterase: Moderate / RBC: 0-2 /HPF / WBC 6-11 /HPF   Sq Epi: x / Non Sq Epi: 6-10 /HPF / Bacteria: Few        RADIOLOGY & ADDITIONAL TESTS:  Studies reviewed.

## 2021-01-11 NOTE — DIETITIAN INITIAL EVALUATION ADULT. - OTHER INFO
RD visited with patient for length of stay. Appetite reported to be good, denies any GI issues i.e. nausea, vomiting, diarrhea.  No chewing or swallowing difficulties reported.  Patient is actively filling out menus in accordance with food preferences.  No nutritional concerns.  On regular diet at [facility] as appropriate.

## 2021-01-11 NOTE — PROGRESS NOTE ADULT - ASSESSMENT
25 y.o. F w/ a hx of recent tonsillectomy, on OCP's p/w SOB, CP, Back pain, and leg pain found to have extensive VTE (bilateral common iliac DVT, b/l PE w/ pulmonary and renal infarct) currently on Heparin gtt.  25 y.o. 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

## 2021-01-11 NOTE — PROGRESS NOTE ADULT - ASSESSMENT
24y/o F hx morbid obesity p/w new PE, common iliac DVT and renal infarct i/s/o PFO and OCP use. Now on AC, pending hematologic workup.     - Tentative plan for thrombectomy pending OR date   - Please keep it ACE wrapped and elevated  - Please Obtain full blood for coagulopathy  - f/u heme recs    - Vascular surgery will continue to follow up    Marian Troncoso PGY2  Vascular Surgery   [id number]

## 2021-01-11 NOTE — PROGRESS NOTE ADULT - ATTENDING COMMENTS
25 y.o. F w/ a hx of recent tonsillectomy, on OCP's hospitalized for extensive VTE (bilateral common iliac DVT, b/l PE w/ pulmonary and renal infarct) currently on Fondaparinox.     # Acute hypoxic respiratory failure 2/2 PE, DVT: Off O2. Unclear etiology, originally thought to be 2/2 recent surgery and relative immobility though patient developed additional LE DVT while on Heparin. PFO seen on REJI, f/u OP. New external iliac DVT causing sig discomfort. Cont ACE wraps, pain control. Will t/b Vascular surgery.      I have personally seen, examined and participated in the care of this patient. I have reviewed all pertinent clinical information, including history, physical exam, plan and the resident's note and agree except as noted.

## 2021-01-11 NOTE — PROGRESS NOTE ADULT - ASSESSMENT
25F with no previous medical history other than tonsilectomy 2 weeks ago presenting with 1 day of back, lower left leg, chest, and arm pain found to have b/l lobar PE, common iliac DVT extending into internal iliac vein, and left lower pole renal infarct.     #B/L Lobar PE  #Common/Internal Iliac Vein DVT   #Left Lower Pole Renal Infarct   #Acute DVT extending from the left calf veins, above the knee, to the level of the left external iliac vein  #PFO    -DVT/PE, likely in the setting of obesity, OCP use, and immobilization after surgery along with possible PFO, but need to r/o catastrophic APLS given new acute thrombus in lower extremity while anticoagulated with heparin gtt. Other ddx includes myeloproliferative disorder, malignancy, covid.  - C/w Fondaparinux 10mg subq daily  -COVID negative   -Rheumatology consulted to r/o possible catastrophic APLS (doubt however sending atypical APLS labs)  -Anticardiolipin positive however IgG titre 15.2 which is not clinically significant for typical APLS, LA neg  -Please send  PNH antigen, JACK 2, NIGHAT, MPL, BCR/ABL, HIT Ab and ROE (doubt any of these)  -Please send factor V Leiden, prothrombin gene mutation  -UA protein negative   -Will need to r/o malignancy, please find out which hospital patient had tonsillectomy may need to ask pathology to recheck the specimen   -avoid estrogen containing OCP use in the future   -Please consult IR/ vascular for possibility of thrombectomy vs TPA: vascular following   -Protein C and S low in the setting of acute clot   -ATIII normal (can also be low while on heparin)   -TTE with bubble study showing bubbles in LV, possible PFO, REJI confirmed PFO and cardiology f/u. Discussed with cardiology again considering she clotted through the heparin and PFO small and in the setting of acute clot typically do not offer closure and it could increased cardiac pressure   -will need to follow [facility] at [facility] after discharge      Adam Rivera MD  Hematology/Oncology Fellow   pager [phone]  After 5pm and on weekends page on call fellow    25F with no previous medical history other than tonsilectomy 2 weeks ago presenting with 1 day of back, lower left leg, chest, and arm pain found to have b/l lobar PE, common iliac DVT extending into internal iliac vein, and left lower pole renal infarct.     #B/L Lobar PE  #Common/Internal Iliac Vein DVT   #Left Lower Pole Renal Infarct   #Acute DVT extending from the left calf veins, above the knee, to the level of the left external iliac vein  #PFO    -DVT/PE, likely in the setting of obesity, OCP use, and immobilization after surgery along with possible PFO, but need to r/o catastrophic APLS given new acute thrombus in lower extremity while anticoagulated with heparin gtt. Other ddx includes myeloproliferative disorder, malignancy, covid.  - C/w Fondaparinux 10mg subq daily  -COVID negative   -Rheumatology consulted to r/o possible catastrophic APLS (doubt however sending atypical APLS labs)  -Anticardiolipin positive however IgG titre 15.2 which is not clinically significant for typical APLS, LA neg  -Please send  PNH antigen, JACK 2, NIGHAT, MPL, BCR/ABL, HIT Ab and ROE (doubt any of these)  -Please send factor V Leiden, prothrombin gene mutation  -UA protein negative   -HIT PF4ab 0.4, not positive or clinically significant and platelet count normal/high  -Will need to r/o malignancy, please find out which hospital patient had tonsillectomy may need to ask pathology to recheck the specimen   -avoid estrogen containing OCP use in the future   -Please consult IR/ vascular for possibility of thrombectomy vs TPA: vascular following   -Protein C and S low in the setting of acute clot   -ATIII normal (can also be low while on heparin)   -TTE with bubble study showing bubbles in LV, possible PFO, REJI confirmed PFO and cardiology f/u. Discussed with cardiology again considering she clotted through the heparin and PFO small and in the setting of acute clot typically do not offer closure and it could increased cardiac pressure   -will need to follow up at [facility] after discharge      Adam Rivera MD  Hematology/Oncology Fellow   pager [phone]  After 5pm and on weekends page on call fellow    25F with no previous medical history other than tonsilectomy 2 weeks ago presenting with 1 day of back, lower left leg, chest, and arm pain found to have b/l lobar PE, common iliac DVT extending into internal iliac vein, and left lower pole renal infarct.     #B/L Lobar PE  #Common/Internal Iliac Vein DVT   #Left Lower Pole Renal Infarct   #Acute DVT extending from the left calf veins, above the knee, to the level of the left external iliac vein  #PFO    -DVT/PE, likely in the setting of obesity, OCP use, and immobilization after surgery along with possible PFO, but need to r/o catastrophic APLS (unlikely per Rheum and given typical APLS work up negative) given new acute thrombus in lower extremity while anticoagulated with heparin gtt. Other ddx includes myeloproliferative disorder, malignancy, covid.  - C/w Fondaparinux 10mg subq daily, can transition to coumadin for goal INR 2-3 and follow up outpatient. Discussed with patient option of fundapariunaux injection vs coumadin which patient would prefer coumadin. Although DOAC preferred and typical APLS negative, possibility of atypical APLS which can take time for the labs to result therefore safest choice of anticoagulation would be coumadin.   -COVID negative   -Rheumatology consulted to r/o possible catastrophic APLS (doubt however sending atypical APLS labs)  -Anticardiolipin positive however IgG titre 15.2 which is not clinically significant for typical APLS, LA neg  -Please send  PNH antigen, JACK 2, NIGHAT, MPL, BCR/ABL, HIT Ab and ROE (doubt any of these)  -Please send factor V Leiden, prothrombin gene mutation  -UA protein negative   -HIT PF4ab 0.4, not positive or clinically significant and platelet count normal/high  -Will need to r/o malignancy, please find out which hospital patient had tonsillectomy may need to ask pathology to recheck the specimen   -avoid estrogen containing OCP use in the future   -Please consult IR/ vascular for possibility of thrombectomy vs TPA: vascular following   -Protein C and S low in the setting of acute clot   -ATIII normal (can also be low while on heparin)   -TTE with bubble study showing bubbles in LV, possible PFO, REJI confirmed PFO and cardiology f/u. Discussed with cardiology again considering she clotted through the heparin and PFO small and in the setting of acute clot typically do not offer closure and it could increased cardiac pressure   -will need to follow up at [facility] after discharge. She can follow up the rest of hypercoaguable work up including MPN work up outpatient.       Adam Rivera MD  Hematology/Oncology Fellow   pager [phone]  After 5pm and on weekends page on call fellow    25F with no previous medical history other than tonsilectomy 2 weeks ago presenting with 1 day of back, lower left leg, chest, and arm pain found to have b/l lobar PE, common iliac DVT extending into internal iliac vein, and left lower pole renal infarct.     #B/L Lobar PE  #Common/Internal Iliac Vein DVT   #Left Lower Pole Renal Infarct   #Acute DVT extending from the left calf veins, above the knee, to the level of the left external iliac vein  #PFO    -DVT/PE, likely in the setting of obesity, OCP use, and immobilization after surgery along with possible PFO, but need to r/o catastrophic APLS (unlikely per Rheum and given typical APLS work up negative) given new acute thrombus in lower extremity while anticoagulated with heparin gtt. Other ddx includes myeloproliferative disorder, malignancy, covid.  - C/w Fondaparinux 10mg subq daily, can transition to coumadin for goal INR 2-3 and follow up outpatient. Discussed with patient option of fundapariunaux injection vs coumadin which patient would prefer coumadin. Although DOAC preferred and typical APLS negative, possibility of atypical APLS which can take time for the labs to result therefore safest choice of anticoagulation would be coumadin.   -Vascular planning on thrombectomy, continue fundaparinaux for now while awaiting procedure, obtain coags PT/PTT/INR  -COVID negative   -Rheumatology consulted to r/o possible catastrophic APLS (doubt however sending atypical APLS labs)  -Anticardiolipin positive however IgG titre 15.2 which is not clinically significant for typical APLS, LA neg  -Please send  PNH antigen, JACK 2, NIGHAT, MPL, BCR/ABL, HIT Ab and ROE (doubt any of these)  -Please send factor V Leiden, prothrombin gene mutation  -UA protein negative   -HIT PF4ab 0.4, not positive or clinically significant and platelet count normal/high  -Will need to r/o malignancy, please find out which hospital patient had tonsillectomy may need to ask pathology to recheck the specimen   -avoid estrogen containing OCP use in the future   -Please consult IR/ vascular for possibility of thrombectomy vs TPA: vascular following   -Protein C and S low in the setting of acute clot   -ATIII normal (can also be low while on heparin)   -TTE with bubble study showing bubbles in LV, possible PFO, REJI confirmed PFO and cardiology f/u. Discussed with cardiology again considering she clotted through the heparin and PFO small and in the setting of acute clot typically do not offer closure and it could increased cardiac pressure   -will need to follow up at [facility] after discharge. She can follow up the rest of hypercoaguable work up including MPN work up outpatient.       Adam Rivera MD  Hematology/Oncology Fellow   pager [phone]  After 5pm and on weekends page on call fellow

## 2021-01-11 NOTE — PROGRESS NOTE ADULT - ATTENDING COMMENTS
Pt seen at bedside. Young woman with recent tonsillectomy, on hormonal meds for dysmenorrhea found to have PE and renal infarct. Echo confirmed R-L shunt via a PFO. Pt has enough reasons for thrombophilia but since she had a significant VTE while n therapeutic dose Heparin, atypical APLS may be a possibility. Therefore, continuing on Fondaparinux or switching to Coumadin are best options. She will need to follow up with hem as outpatient.

## 2021-01-11 NOTE — DIETITIAN INITIAL EVALUATION ADULT. - PERTINENT LABORATORY DATA
01-11 Na133 mmol/L<L> Glu 95 mg/dL K+ 4.3 mmol/L Cr  0.84 mg/dL BUN 10 mg/dL 01-11 Phos 3.2 mg/dL 01-11 Alb 3.4 g/dL

## 2021-01-11 NOTE — PROGRESS NOTE ADULT - PROBLEM SELECTOR PLAN 1
CT angio w/ bilateral PE. On heparin.  - likely provoked in setting of recent surgery and immobility.  - O2 support as needed  - Pain management as below  - TTE showing possible R to L shunt, REJI confirmed (1/6), cardiology recs appreciated; no indication for acute closure at the time  - heme consulted for coagulopathy and anemia.   - APLS labs are negative can be discharged on a DOAC (anticardiolipin ab positive, IgG however only 15.2 titre, not significant).  - will need 6 months of treatment  - heme/onc recs appreciated, will f/u and order labs  - rheum recs appreciated, will f/u and order labs

## 2021-01-11 NOTE — PROGRESS NOTE ADULT - SUBJECTIVE AND OBJECTIVE BOX
SURGERY DAILY PROGRESS NOTE:     SUBJECTIVE/24hr Events:     Patient seen and examined on am rounds. Reports LLE pain overnight, improved after leg re-wrapped more proximally. Otherwise no complaints. Denies chest pain, shortness of breath, nausea, vomiting, fever chills.     OBJECTIVE:    MEDICATIONS  (STANDING):  acetaminophen   Tablet .. 975 milliGRAM(s) Oral every 8 hours  fondaparinux Injectable 7.5 milliGRAM(s) SubCutaneous daily  influenza   Vaccine 0.5 milliLiter(s) IntraMuscular once  lidocaine   Patch 1 Patch Transdermal daily  lidocaine   Patch 1 Patch Transdermal daily  polyethylene glycol 3350 17 Gram(s) Oral two times a day  senna 1 Tablet(s) Oral daily    MEDICATIONS  (PRN):  bisacodyl Suppository 10 milliGRAM(s) Rectal daily PRN Constipation  oxyCODONE    IR 10 milliGRAM(s) Oral every 4 hours PRN Severe Pain (7 - 10)  oxyCODONE    IR 5 milliGRAM(s) Oral every 4 hours PRN Moderate Pain (4 - 6)      Vital Signs Last 24 Hrs  T(C): 36.7 (2021 12:05), Max: 36.7 (2021 05:23)  T(F): 98.1 (2021 12:05), Max: 98.1 (2021 05:23)  HR: 110 (2021 12:05) (110 - 124)  BP: 119/65 (2021 12:05) (108/58 - 119/65)  BP(mean): --  RR: 19 (2021 12:05) (18 - 19)  SpO2: 100% (2021 12:05) (98% - 100%)      I&O's Detail        Daily     Daily           LABS:                        9.2    11.08 )-----------( 387      ( 2021 07:00 )             30.9     -11    133<L>  |  97<L>  |  10  ----------------------------<  95  4.3   |  22  |  0.84    Ca    9.4      2021 07:00  Phos  3.2     11  Mg     2.1     11    TPro  8.0  /  Alb  3.4  /  TBili  0.5  /  DBili  x   /  AST  20  /  ALT  36<H>  /  AlkPhos  100  01-11    PTT - ( 10 Wilian 2021 07:37 )  PTT:31.2 sec  Urinalysis Basic - ( 10 Wilian 2021 08:00 )    Color: Yellow / Appearance: Slightly Turbid / S.035 / pH: x  Gluc: x / Ketone: Small  / Bili: Negative / Urobili: 12 mg/dL   Blood: x / Protein: 30 mg/dL / Nitrite: Negative   Leuk Esterase: Moderate / RBC: 0-2 /HPF / WBC 6-11 /HPF   Sq Epi: x / Non Sq Epi: 6-10 /HPF / Bacteria: Few                PHYSICAL EXAM:  Constitutional: NAD  ENMT: normal facies, symmetric  Respiratory: Normal respiratory effort   Extremities: Left lower extremity warm, soft, w/ significant swelling. Sensation intact, tenderness on passive dorsiflexion. DP/PT palpable. Strength 5/5.   Psychiatric: oriented x 3; appropriate

## 2021-01-11 NOTE — DIETITIAN INITIAL EVALUATION ADULT. - PERTINENT MEDS FT
MEDICATIONS  (STANDING):  acetaminophen   Tablet .. 975 milliGRAM(s) Oral every 8 hours  fondaparinux Injectable 7.5 milliGRAM(s) SubCutaneous daily  influenza   Vaccine 0.5 milliLiter(s) IntraMuscular once  lidocaine   Patch 1 Patch Transdermal daily  lidocaine   Patch 1 Patch Transdermal daily  polyethylene glycol 3350 17 Gram(s) Oral two times a day  senna 1 Tablet(s) Oral daily    MEDICATIONS  (PRN):  oxyCODONE    IR 10 milliGRAM(s) Oral every 4 hours PRN Severe Pain (7 - 10)  oxyCODONE    IR 5 milliGRAM(s) Oral every 4 hours PRN Moderate Pain (4 - 6)

## 2021-01-12 LAB
ALBUMIN SERPL ELPH-MCNC: 3.4 G/DL — SIGNIFICANT CHANGE UP (ref 3.3–5)
ALP SERPL-CCNC: 94 U/L — SIGNIFICANT CHANGE UP (ref 40–120)
ALT FLD-CCNC: 31 U/L — SIGNIFICANT CHANGE UP (ref 4–33)
ANION GAP SERPL CALC-SCNC: 16 MMOL/L — HIGH (ref 7–14)
AST SERPL-CCNC: 20 U/L — SIGNIFICANT CHANGE UP (ref 4–32)
BASOPHILS # BLD AUTO: 0.05 K/UL — SIGNIFICANT CHANGE UP (ref 0–0.2)
BASOPHILS NFR BLD AUTO: 0.4 % — SIGNIFICANT CHANGE UP (ref 0–2)
BILIRUB SERPL-MCNC: 0.5 MG/DL — SIGNIFICANT CHANGE UP (ref 0.2–1.2)
BUN SERPL-MCNC: 11 MG/DL — SIGNIFICANT CHANGE UP (ref 7–23)
CALCIUM SERPL-MCNC: 9.5 MG/DL — SIGNIFICANT CHANGE UP (ref 8.4–10.5)
CHLORIDE SERPL-SCNC: 95 MMOL/L — LOW (ref 98–107)
CO2 SERPL-SCNC: 24 MMOL/L — SIGNIFICANT CHANGE UP (ref 22–31)
CREAT SERPL-MCNC: 0.88 MG/DL — SIGNIFICANT CHANGE UP (ref 0.5–1.3)
EOSINOPHIL # BLD AUTO: 0.1 K/UL — SIGNIFICANT CHANGE UP (ref 0–0.5)
EOSINOPHIL NFR BLD AUTO: 0.8 % — SIGNIFICANT CHANGE UP (ref 0–6)
GAMMA INTERFERON BACKGROUND BLD IA-ACNC: 0.02 IU/ML — SIGNIFICANT CHANGE UP
GLUCOSE SERPL-MCNC: 88 MG/DL — SIGNIFICANT CHANGE UP (ref 70–99)
HCT VFR BLD CALC: 29.7 % — LOW (ref 34.5–45)
HGB BLD-MCNC: 9 G/DL — LOW (ref 11.5–15.5)
IANC: 8.41 K/UL — SIGNIFICANT CHANGE UP (ref 1.5–8.5)
IMM GRANULOCYTES NFR BLD AUTO: 1 % — SIGNIFICANT CHANGE UP (ref 0–1.5)
LYMPHOCYTES # BLD AUTO: 1.75 K/UL — SIGNIFICANT CHANGE UP (ref 1–3.3)
LYMPHOCYTES # BLD AUTO: 14.7 % — SIGNIFICANT CHANGE UP (ref 13–44)
M TB IFN-G BLD-IMP: ABNORMAL
M TB IFN-G CD4+ BCKGRND COR BLD-ACNC: 0 IU/ML — SIGNIFICANT CHANGE UP
M TB IFN-G CD4+CD8+ BCKGRND COR BLD-ACNC: 0 IU/ML — SIGNIFICANT CHANGE UP
MAGNESIUM SERPL-MCNC: 2.3 MG/DL — SIGNIFICANT CHANGE UP (ref 1.6–2.6)
MCHC RBC-ENTMCNC: 20.3 PG — LOW (ref 27–34)
MCHC RBC-ENTMCNC: 30.3 GM/DL — LOW (ref 32–36)
MCV RBC AUTO: 67 FL — LOW (ref 80–100)
MONOCYTES # BLD AUTO: 1.47 K/UL — HIGH (ref 0–0.9)
MONOCYTES NFR BLD AUTO: 12.4 % — SIGNIFICANT CHANGE UP (ref 2–14)
NEUTROPHILS # BLD AUTO: 8.41 K/UL — HIGH (ref 1.8–7.4)
NEUTROPHILS NFR BLD AUTO: 70.7 % — SIGNIFICANT CHANGE UP (ref 43–77)
NRBC # BLD: 0 /100 WBCS — SIGNIFICANT CHANGE UP
NRBC # FLD: 0 K/UL — SIGNIFICANT CHANGE UP
PHOSPHATE SERPL-MCNC: 3.5 MG/DL — SIGNIFICANT CHANGE UP (ref 2.5–4.5)
PLATELET # BLD AUTO: 430 K/UL — HIGH (ref 150–400)
POTASSIUM SERPL-MCNC: 4.2 MMOL/L — SIGNIFICANT CHANGE UP (ref 3.5–5.3)
POTASSIUM SERPL-SCNC: 4.2 MMOL/L — SIGNIFICANT CHANGE UP (ref 3.5–5.3)
PROT SERPL-MCNC: 8.5 G/DL — HIGH (ref 6–8.3)
QUANT TB PLUS MITOGEN MINUS NIL: 0.36 IU/ML — SIGNIFICANT CHANGE UP
RBC # BLD: 4.43 M/UL — SIGNIFICANT CHANGE UP (ref 3.8–5.2)
RBC # FLD: 16.1 % — HIGH (ref 10.3–14.5)
SARS-COV-2 RNA SPEC QL NAA+PROBE: SIGNIFICANT CHANGE UP
SODIUM SERPL-SCNC: 135 MMOL/L — SIGNIFICANT CHANGE UP (ref 135–145)
SRA INTERP SER-IMP: SIGNIFICANT CHANGE UP
WBC # BLD: 11.9 K/UL — HIGH (ref 3.8–10.5)
WBC # FLD AUTO: 11.9 K/UL — HIGH (ref 3.8–10.5)

## 2021-01-12 PROCEDURE — 99232 SBSQ HOSP IP/OBS MODERATE 35: CPT | Mod: GC

## 2021-01-12 PROCEDURE — G0452: CPT | Mod: 26

## 2021-01-12 PROCEDURE — 99233 SBSQ HOSP IP/OBS HIGH 50: CPT | Mod: GC

## 2021-01-12 RX ORDER — FONDAPARINUX SODIUM 2.5 MG/.5ML
7.5 INJECTION, SOLUTION SUBCUTANEOUS
Qty: 225 | Refills: 0
Start: 2021-01-12 | End: 2021-02-10

## 2021-01-12 RX ORDER — FONDAPARINUX SODIUM 2.5 MG/.5ML
7.5 INJECTION, SOLUTION SUBCUTANEOUS DAILY
Refills: 0 | Status: DISCONTINUED | OUTPATIENT
Start: 2021-01-12 | End: 2021-01-13

## 2021-01-12 RX ADMIN — LIDOCAINE 1 PATCH: 4 CREAM TOPICAL at 11:13

## 2021-01-12 RX ADMIN — Medication 975 MILLIGRAM(S): at 11:12

## 2021-01-12 RX ADMIN — OXYCODONE HYDROCHLORIDE 10 MILLIGRAM(S): 5 TABLET ORAL at 08:54

## 2021-01-12 RX ADMIN — OXYCODONE HYDROCHLORIDE 10 MILLIGRAM(S): 5 TABLET ORAL at 04:43

## 2021-01-12 RX ADMIN — POLYETHYLENE GLYCOL 3350 17 GRAM(S): 17 POWDER, FOR SOLUTION ORAL at 04:43

## 2021-01-12 RX ADMIN — Medication 975 MILLIGRAM(S): at 04:43

## 2021-01-12 RX ADMIN — LIDOCAINE 1 PATCH: 4 CREAM TOPICAL at 00:16

## 2021-01-12 RX ADMIN — FONDAPARINUX SODIUM 7.5 MILLIGRAM(S): 2.5 INJECTION, SOLUTION SUBCUTANEOUS at 17:04

## 2021-01-12 RX ADMIN — OXYCODONE HYDROCHLORIDE 10 MILLIGRAM(S): 5 TABLET ORAL at 21:16

## 2021-01-12 RX ADMIN — SENNA PLUS 1 TABLET(S): 8.6 TABLET ORAL at 11:13

## 2021-01-12 RX ADMIN — Medication 5 MILLIGRAM(S): at 21:15

## 2021-01-12 RX ADMIN — OXYCODONE HYDROCHLORIDE 10 MILLIGRAM(S): 5 TABLET ORAL at 12:56

## 2021-01-12 RX ADMIN — LIDOCAINE 1 PATCH: 4 CREAM TOPICAL at 06:02

## 2021-01-12 RX ADMIN — OXYCODONE HYDROCHLORIDE 10 MILLIGRAM(S): 5 TABLET ORAL at 17:04

## 2021-01-12 RX ADMIN — LIDOCAINE 1 PATCH: 4 CREAM TOPICAL at 19:41

## 2021-01-12 RX ADMIN — Medication 975 MILLIGRAM(S): at 21:16

## 2021-01-12 RX ADMIN — POLYETHYLENE GLYCOL 3350 17 GRAM(S): 17 POWDER, FOR SOLUTION ORAL at 17:04

## 2021-01-12 RX ADMIN — LIDOCAINE 1 PATCH: 4 CREAM TOPICAL at 00:15

## 2021-01-12 NOTE — PROGRESS NOTE ADULT - ASSESSMENT
25 y.o. 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

## 2021-01-12 NOTE — PROGRESS NOTE ADULT - ASSESSMENT
26y/o F hx morbid obesity p/w new PE, common iliac DVT and renal infarct i/s/o PFO and OCP use. Now on AC, pending hematologic workup.     - Monitor VS, c/f propagation of PE, consider repeat scan   - Tentative plan for thrombectomy pending OR date   - Please keep it ACE wrapped and elevated  - Please Obtain full blood for coagulopathy  - f/u heme recs    - Will continue to follow     Marian Troncoso PGY2  Vascular Surgery   [id number]

## 2021-01-12 NOTE — PROGRESS NOTE ADULT - ATTENDING COMMENTS
pt seen and examined by me personally   agree with above   patient is aware of our impression and plan

## 2021-01-12 NOTE — PROGRESS NOTE ADULT - PROBLEM SELECTOR PLAN 2
likely provoked by recent surgery and OCP use  - anticoagulated on heparin with therapeutic aptt  - repeat LLE US showed new DVT on hep gtt. Per discussion with heme, wouldn't call it AC failure given patient has been on AC since 1/2. However will leave formal recs  - per vascular tentative plan for thrombectomy  - Pain management as below  - F/u coagulapathy workup

## 2021-01-12 NOTE — PROGRESS NOTE ADULT - ATTENDING COMMENTS
25 y.o. F w/ a hx of recent tonsillectomy, on OCP's hospitalized for extensive VTE (bilateral common iliac DVT, b/l PE w/ pulmonary and renal infarct) currently on Fondaparinux.     # Acute hypoxic respiratory failure 2/2 PE, DVT: Off O2. Unclear etiology, originally thought to be 2/2 recent surgery and relative immobility though patient developed additional LE DVT while on Heparin. PFO seen on REJI, f/u OP. New external iliac DVT causing sig discomfort. Cont ACE wraps, pain control. Vascular for poss thrombectomy.      I have personally seen, examined and participated in the care of this patient. I have reviewed all pertinent clinical information, including history, physical exam, plan and the resident's note and agree except as noted.

## 2021-01-12 NOTE — PROGRESS NOTE ADULT - SUBJECTIVE AND OBJECTIVE BOX
CAROLYN LICEA  [id number]    INTERVAL HPI/OVERNIGHT EVENTS:    The patient is lying in bed, says she feels better.     Denies CP, SOB, n/v/d, fever or chills.     MEDICATIONS  (STANDING):  acetaminophen   Tablet .. 975 milliGRAM(s) Oral every 8 hours  fondaparinux Injectable 7.5 milliGRAM(s) SubCutaneous daily  influenza   Vaccine 0.5 milliLiter(s) IntraMuscular once  lidocaine   Patch 1 Patch Transdermal daily  lidocaine   Patch 1 Patch Transdermal daily  polyethylene glycol 3350 17 Gram(s) Oral two times a day  senna 1 Tablet(s) Oral daily    MEDICATIONS  (PRN):  bisacodyl Suppository 10 milliGRAM(s) Rectal daily PRN Constipation  oxyCODONE    IR 10 milliGRAM(s) Oral every 4 hours PRN Severe Pain (7 - 10)  oxyCODONE    IR 5 milliGRAM(s) Oral every 4 hours PRN Moderate Pain (4 - 6)    Vital Signs Last 24 Hrs  T(C): 37.2 (12 Jan 2021 13:55), Max: 37.5 (11 Jan 2021 21:21)  T(F): 99 (12 Jan 2021 13:55), Max: 99.5 (11 Jan 2021 21:21)  HR: 110 (12 Jan 2021 13:55) (110 - 120)  BP: 117/70 (12 Jan 2021 13:55) (115/69 - 117/70)  BP(mean): --  RR: 19 (12 Jan 2021 13:55) (18 - 19)  SpO2: 98% (12 Jan 2021 13:55) (98% - 100%)    Physical Exam:  General: NAD  HEENT: EOMI, MMM  Cardio: +S1/S2, RRR  Resp: CTA b/l  GI: +BS, soft, NT/ND  MSK: No signs of synovitis, limitation of ROM or deformity noted in any joint. ACE wrap in LLE.   Neuro: AAOx3    LABS:                        9.0    11.90 )-----------( 430      ( 12 Jan 2021 07:53 )             29.7     01-12    135  |  95<L>  |  11  ----------------------------<  88  4.2   |  24  |  0.88    Ca    9.5      12 Jan 2021 07:53  Phos  3.5     01-12  Mg     2.3     01-12    TPro  8.5<H>  /  Alb  3.4  /  TBili  0.5  /  DBili  x   /  AST  20  /  ALT  31  /  AlkPhos  94  01-12    Lupus Anticoagulant Profile (01.05.21 @ 15:03)   Lupus Anticoagulant Profile Result: Completed [facility] Lupus Anticoagulant Profile test is a tracking test used to order:   CARDIGG, CARDIGM, Activated Partial Thromboplastin Time, PT/INR, Dilute   Thrombin Time, Diluted Pavan's Viper Venom Time, Silica Clotting Time.   Results will be reported separately.   Beta 2 Glycoprotein 1 Antibody Screen (01.05.21 @ 20:10)   Beta 2 Glycoprotein 1 Antibody Screen: Negative       Historical Values  Beta 2 Glycoprotein 1 Antibody Screen (01.05.21 @ 20:10)   Beta 2 Glycoprotein 1 Antibody Screen: Negative   Beta 2 Glycoprotein 1 Antibody Screen (01.02.21 @ 10:47)   Beta 2 Glycoprotein 1 Antibody Screen: Negative   Anticardiolipin Antibody Level, Total (01.05.21 @ 20:10)   Anticardiolipin Antibody Level, Total: Positive: Some false positive results are to be expected when comparing the PIPPA   Screen to specific IgG and IgM anticardiolipin tests. A slight   oversensitivity is built into the PIPPA Screen as a margin to ensure that   weakly positive samples will not be missed. In addition, some samples   may have low-level concentrations of IgG, IgA, and IgM anticardiolipin   antibodies that would be found below the positive cutoff for each   individual test yet the additive effect might cause the PIPPA Screen assay   resultsto be positive.   Method: EIA       Historical Values  Anticardiolipin Antibody Level, Total (01.05.21 @ 20:10)   Anticardiolipin Antibody Level, Total: Positive: Some false positive results are to be expected when comparing the PIPPA   Screen to specific IgG and IgM anticardiolipin tests. A slight   oversensitivity is built into the PIPPA Screen as a margin to ensure that   weakly positive samples will not be missed. In addition, some samples   may have low-level concentrations of IgG, IgA, and IgM anticardiolipin   antibodies that would be found below the positive cutoff for each     RADIOLOGY & ADDITIONAL STUDIES:  < from: CT Abdomen and Pelvis w/ IV Cont (01.02.21 @ 01:30) >    EXAM:  CT ANGIO CHEST (W)AW IC      EXAM:  CT ABDOMEN AND PELVIS IC      PROCEDURE DATE:  Jan 2 2021     INTERPRETATION:  CLINICAL INFORMATION: Chest pain. Diffuse upper abdominal pain. Right flank pain.    COMPARISON: None.    PROCEDURE:  CT Angiography of the Chest was performed followed by portal venous phase imaging of the Abdomen and Pelvis.  IV Contrast: 90 ml of Omnipaque 350 was injected intravenously. 10 ml were discarded.  Oral contrast: None.  Sagittal and coronal reformats were performed as well as 3D (MIP) reconstructions.    FINDINGS:  CHEST:  LUNGS AND LARGE AIRWAYS: Patent central airways. Left lower lobe wedge-shaped peripheral groundglass opacity with areas of central lucency, likely infarct.  PLEURA: No pleural effusion.  VESSELS: Right upper lobe lobar pulmonary embolism extending into the segmental branches. Right lower lobe lobar pulmonary embolism extending into the segmental branches. Left lower lobe lobar pulmonary embolism extending into the segmental branches.  HEART: Heart size is normal. No pericardial effusion. Right heart is not enlarged. MEDIASTINUM AND STACI: No lymphadenopathy.  CHEST WALL AND LOWER NECK: Heterogeneous bilateral thyroid glands.    ABDOMEN AND PELVIS:  LIVER: Within normal limits.  BILE DUCTS: Normal caliber.  GALLBLADDER: Cholelithiasis.  SPLEEN: Within normal limits.  PANCREAS: Within normal limits.  ADRENALS: Within normal limits.  KIDNEYS/URETERS: Wedge-shaped left lower pole renal hypodensity, likely infarct. No stonesor hydronephrosis    BLADDER: Within normal limits.  REPRODUCTIVE ORGANS: Uterus and adnexa within normal limits.    BOWEL: No bowel obstruction. Appendix is normal.  PERITONEUM: No ascites.  VESSELS: Bilateral common iliac DVT with possible extension into the bilateral internal iliac veins.  RETROPERITONEUM/LYMPH NODES: No lymphadenopathy.  ABDOMINAL WALL: Within normal limits.  BONES: Within normal limits.    IMPRESSION:  Bilateral lobar pulmonary embolism extending into the segmental branches with left lower lobe pulmonary infarct, as above.    Bilateral common iliac DVT extending into the bilateral internal iliac veins.    Left lower pole renal infarct.    Cholelithiasis without cholecystitis.    Comment: Coexistence of pulmonary embolism/infarct and renal infarct may suggest the presence of right to left shunt. Further evaluation with echocardiogram is recommended.    Findings were discussed with Dr. JAZZ WILLIS 1/2/2021 1:43 AM by Dr. Betancourt with read back confirmation.      TRENT PENALOZA; Resident Interventional Radiology  This document has been electronically signed.  PRICE EDMOND MD; Attending Radiologist  This document has been electronically signed. Jan 2 2021  2:59AM    < end of copied text >    < from: US Duplex Venous Lower Ext Complete, Bilateral (01.08.21 @ 18:14) >    EXAM:  US DPLX LWR EXT VEINS COMPL BI        PROCEDURE DATE:  Jan 8 2021         INTERPRETATION:  CLINICAL INFORMATION: 24-year-old with bilateral PE.    COMPARISON: US lower extremity 1/2/2021.    TECHNIQUE: Duplex sonography of the BILATERAL LOWER extremity veins with color and spectral Doppler, with and without compression.    FINDINGS:    There is acute thrombus extending from the left calf veins through the left external iliac vein. There is noncompressibility of the veins of the left lower extremity. No flow is seen on Doppler exam.    No acute DVT in the right lower extremity.    IMPRESSION:  Acute DVT extending from the left calf veins, above the knee, to the level of the left external iliac vein.  Dr. Sales discussed these findings with MD Marcelino on 1/8/2021 6:19 PM .      CHRISTEN SALES MD; Resident Radiology  This document has been electronically signed.  LITA DUVAL MD; Attending Radiologist  This document has been electronically signed. Jan 9 2021  8:08AM    < end of copied text >  < from: US Duplex Venous Lower Ext Ltd, Left (01.02.21 @ 02:52) >  EXAM:  US DPLX LWR EXT VEINS LTD LT        PROCEDURE DATE:  Jan 2 2021       INTERPRETATION:  CLINICAL INFORMATION: Left lower extremity swelling and pain    COMPARISON: CT abdomen pelvis.    TECHNIQUE: Duplex sonography of the LEFT LOWER extremity veins with color and spectral Doppler, with and without compression.    FINDINGS:    There is normal compressibility of the left common femoral, femoral and popliteal veins.  The contralateral common femoral vein is patent.    Absent respiratory variation in the left common femoral vein    No calf vein thrombosis is detected.    IMPRESSION:  No evidence of left lower extremity deep venous thrombosis below the inguinal ligament.    Absent respiratory variation in the left common femoral vein may be secondary to left common iliac vein DVT seen on CT abdomen pelvis from same day    < end of copied text >      < from: Transesophageal Echocardiogram w/o TTE (01.06.21 @ 17:33) >  CONCLUSIONS:  1. Normal left atrium. No left atrial or left atrial  appendage thrombus.  2. Normal left ventricular internal dimensions and wall  thicknesses.  3. Normal left ventricular systolic function. No segmental  wall motion abnormalities.  4. Normal right ventricular size and function.  5. Agitated saline injection demonstrates evidence of a  patent foramen ovale with right to left shunt after  Valsalva maneuver.    < end of copied text >

## 2021-01-12 NOTE — PROVIDER CONTACT NOTE (OTHER) - ACTION/TREATMENT ORDERED:
MD aware. Give morning Tylenol.
Pulse palpated .  NO treatment ordered
Awaiting for Dr. Thomas's orders
MD aware. Give standing Tylenol now. Give PRN pain medication when due.
MD aware. Give PRN medication. MD will assess patient at the bedside and rewrap the left leg.

## 2021-01-12 NOTE — PROGRESS NOTE ADULT - PROBLEM SELECTOR PLAN 1
CT angio w/ bilateral PE  - likely provoked in setting of recent surgery and immobility  - O2 support as needed  - Pain management as below  - TTE showing possible R to L shunt, REJI confirmed (1/6), cardiology recs appreciated; no indication for acute closure at the time  - heme consulted for coagulopathy and anemia.   - APLS labs are negative can be discharged on a DOAC (anticardiolipin ab positive, IgG however only 15.2 titre, not significant).  - will need 6 months of treatment  - heme/onc recs appreciated  - rheum recs appreciated, f/u hypercoagulability work up  - continue fondaparinux 7.5 qD, dosing confirmed by pharmacy 1/12

## 2021-01-12 NOTE — PROGRESS NOTE ADULT - ASSESSMENT
25 Y F with no significant PMHx except for tonsillectomy 3 weeks ago who presented to [facility] with 1 day history of back, chest, and arm pain. On initial imaging she was found to have b/l lobar PE, common iliac DVT extending into internal iliac vein, and left lower pole renal infarct. Pt. was started on heparin but then found to have new acute DVT extending from the left calf veins, above the knee, to the level of the left external iliac vein. She also found to have PFO on REJI. Rheumatology was called concerned for APS/ CAPS.    Impression:   # Extensive thromboembolic events likely in the setting of obesity, recently initiated OCP and immobilization.   Other DDX include Inherited or genetic hypercoagulable states, malignancy, infection.   APS is a differential in a young female with extensive VTE, however, given negative typical APLs (Lupus Anticoagulant, Anticardiolipin Ab: only Ig G slightly elevated to 15 once, Beta 2 glycoprotein) , no evidence of organ failure, no thrombocytopenia or hemolytic anemia and no other clinical evidence for any underlying autoimmune disease we have low suspicion for APS/ CAPS at this time.   W/u for malignancy so far unrevealing; team trying to obtain bx results from tonsillectomy from previous hospital.   W/u for hypercoagulable state underway, found to have low protein C and S, will let Hematology comment on their significance.   Currently on Fondaparinux as she had a DVT on Heparin.   Vascular surgery following, planning a Thrombectomy.      Recs:   F/u atypical APLs including PSPT, Phosphatidylserine, Phosphatidylinositol, Phosphatidylethanolamine.  C/w AC as per Heme.  F/u vascular surgery recs on thrombectomy site.      DW with Dr. Zander Benson MD  Rheum fellow PGY 4  Pager [phone]

## 2021-01-12 NOTE — PROGRESS NOTE ADULT - SUBJECTIVE AND OBJECTIVE BOX
PROGRESS NOTE:   Evon Yanes MD   Internal Medicine PGY-1  [place] [phone] / [phone]    Patient is a 25y old  Female who presents with a chief complaint of DVT + PE + renal infarct (12 Jan 2021 14:31)    SUBJECTIVE / OVERNIGHT EVENTS: No acute events overnight, remains tachycardic and continues to complain of LLE throbbing pain improved by lidocaine and constipation    ADDITIONAL REVIEW OF SYSTEMS:  CONSTITUTIONAL: No weakness, fevers or chills  NECK: No pain or stiffness  RESPIRATORY: No shortness of breath  CARDIOVASCULAR: No chest pain or palpitations  GASTROINTESTINAL: No abdominal pain; nausea, vomiting;  diarrhea. +constipation  GENITOURINARY: No dysuria  NEUROLOGICAL: No numbness or weakness  SKIN: No rashes    MEDICATIONS  (STANDING):  acetaminophen   Tablet .. 975 milliGRAM(s) Oral every 8 hours  fondaparinux Injectable 7.5 milliGRAM(s) SubCutaneous daily  influenza   Vaccine 0.5 milliLiter(s) IntraMuscular once  lidocaine   Patch 1 Patch Transdermal daily  lidocaine   Patch 1 Patch Transdermal daily  polyethylene glycol 3350 17 Gram(s) Oral two times a day  senna 1 Tablet(s) Oral daily    MEDICATIONS  (PRN):  bisacodyl Suppository 10 milliGRAM(s) Rectal daily PRN Constipation  oxyCODONE    IR 10 milliGRAM(s) Oral every 4 hours PRN Severe Pain (7 - 10)  oxyCODONE    IR 5 milliGRAM(s) Oral every 4 hours PRN Moderate Pain (4 - 6)    PHYSICAL EXAM:  Vital Signs Last 24 Hrs  T(C): 37.2 (12 Jan 2021 13:55), Max: 37.5 (11 Jan 2021 21:21)  T(F): 99 (12 Jan 2021 13:55), Max: 99.5 (11 Jan 2021 21:21)  HR: 110 (12 Jan 2021 13:55) (110 - 120)  BP: 117/70 (12 Jan 2021 13:55) (115/69 - 117/70)  RR: 19 (12 Jan 2021 13:55) (18 - 19)  SpO2: 98% (12 Jan 2021 13:55) (98% - 100%)    CONSTITUTIONAL: NAD, lying in bed comfortably  EYES: EOMI, conjunctiva and sclera clear  ENMT: Moist oral mucosa; normal dentition  NECK: Supple, no palpable masses  RESPIRATORY: Lungs clear to ascultation b/l; No rales, rhonchi, or wheezing; Unlabored respirations  CARDIOVASCULAR: +tachycardic, Regular rate and rhythm, normal S1 and S2, no murmurs, rubs, or gallops  ABDOMEN: Soft, nontender, nondistended, normal bowel sounds  MUSCULOSKELETAL: LLE swelling left > right. DP pulses palpable  PSYCH: Tearful at times  NEUROLOGY: AAOx3, CNs grossly intact  SKIN: No rashes; no palpable lesions    LABS:             9.0    11.90 )-----------( 430      ( 12 Jan 2021 07:53 )             29.7     01-12    135  |  95<L>  |  11  ----------------------------<  88  4.2   |  24  |  0.88    Ca    9.5      12 Jan 2021 07:53  Phos  3.5     01-12  Mg     2.3     01-12    TPro  8.5<H>  /  Alb  3.4  /  TBili  0.5  /  DBili  x   /  AST  20  /  ALT  31  /  AlkPhos  94  01-12    RADIOLOGY & ADDITIONAL TESTS:  Results Reviewed:   Imaging Personally Reviewed:   Electrocardiogram Personally Reviewed:     COORDINATION OF CARE:  Care Discussed with Consultants/Other Providers [Y/N]:   Prior or Outpatient Records Reviewed [Y/N]:

## 2021-01-12 NOTE — PROVIDER CONTACT NOTE (OTHER) - BACKGROUND
Patient admitted for pulmonary embolism
Pt admitted with PE/DVT
Patient admitted for pulmonary embolism.
Patient admitted with PE and left leg DVT. Hx of tonsillectomy
Patient admitted for pulmonary embolism

## 2021-01-12 NOTE — CHART NOTE - NSCHARTNOTEFT_GEN_A_CORE
Heme update    Called by vascular surgery as they said they may be taking patient for thrombectomy tomorrow.  Patient received fondaparinux at 5 PM.  Ideally patient would be off for 24-36 hours prior to planned procedures, however if needed to take patient emergently they could consider reversing the agent with andexxa.      Would hold fondaparinux at this time for possible procedure.  Christopher will follow up tomorrow AM.

## 2021-01-12 NOTE — PROGRESS NOTE ADULT - SUBJECTIVE AND OBJECTIVE BOX
SURGERY DAILY PROGRESS NOTE:     SUBJECTIVE/24hr Events:     Patient seen and examined on am rounds. Overnight with worsening tachycardia -> 120s, normotensive. Pt states LLE pain stable. Denies chest pain, shortness of breath, nausea, vomiting, fever chills.     OBJECTIVE:    MEDICATIONS  (STANDING):  acetaminophen   Tablet .. 975 milliGRAM(s) Oral every 8 hours  fondaparinux Injectable 7.5 milliGRAM(s) SubCutaneous daily  influenza   Vaccine 0.5 milliLiter(s) IntraMuscular once  lidocaine   Patch 1 Patch Transdermal daily  lidocaine   Patch 1 Patch Transdermal daily  polyethylene glycol 3350 17 Gram(s) Oral two times a day  senna 1 Tablet(s) Oral daily    MEDICATIONS  (PRN):  bisacodyl Suppository 10 milliGRAM(s) Rectal daily PRN Constipation  oxyCODONE    IR 10 milliGRAM(s) Oral every 4 hours PRN Severe Pain (7 - 10)  oxyCODONE    IR 5 milliGRAM(s) Oral every 4 hours PRN Moderate Pain (4 - 6)      Vital Signs Last 24 Hrs  T(C): 37.5 (11 Jan 2021 21:21), Max: 37.5 (11 Jan 2021 21:21)  T(F): 99.5 (11 Jan 2021 21:21), Max: 99.5 (11 Jan 2021 21:21)  HR: 120 (11 Jan 2021 21:21) (110 - 120)  BP: 115/69 (11 Jan 2021 21:21) (115/69 - 119/65)  BP(mean): --  RR: 18 (11 Jan 2021 21:21) (18 - 19)  SpO2: 100% (11 Jan 2021 21:21) (100% - 100%)      I&O's Detail        Daily     Daily           LABS:                        9.2    11.08 )-----------( 387      ( 11 Jan 2021 07:00 )             30.9     01-12    135  |  95<L>  |  11  ----------------------------<  88  4.2   |  24  |  0.88    Ca    9.5      12 Jan 2021 07:53  Phos  3.5     01-12  Mg     2.3     01-12    TPro  8.5<H>  /  Alb  3.4  /  TBili  0.5  /  DBili  x   /  AST  20  /  ALT  31  /  AlkPhos  94  01-12      PHYSICAL EXAM:  Constitutional: NAD  ENMT: normal facies, symmetric  Respiratory: Normal respiratory effort   Extremities: Left lower extremity warm, soft, w/ significant swelling. Sensation intact, tenderness on passive dorsiflexion. DP/PT palpable. Strength 5/5.   Psychiatric: oriented x 3; appropria

## 2021-01-13 LAB
ALBUMIN SERPL ELPH-MCNC: 3.4 G/DL — SIGNIFICANT CHANGE UP (ref 3.3–5)
ALP SERPL-CCNC: 124 U/L — HIGH (ref 40–120)
ALT FLD-CCNC: 40 U/L — HIGH (ref 4–33)
ANION GAP SERPL CALC-SCNC: 14 MMOL/L — SIGNIFICANT CHANGE UP (ref 7–14)
APTT BLD: 32.8 SEC — SIGNIFICANT CHANGE UP (ref 27–36.3)
AST SERPL-CCNC: 36 U/L — HIGH (ref 4–32)
BILIRUB SERPL-MCNC: 0.5 MG/DL — SIGNIFICANT CHANGE UP (ref 0.2–1.2)
BLD GP AB SCN SERPL QL: NEGATIVE — SIGNIFICANT CHANGE UP
BUN SERPL-MCNC: 12 MG/DL — SIGNIFICANT CHANGE UP (ref 7–23)
CALCIUM SERPL-MCNC: 9.7 MG/DL — SIGNIFICANT CHANGE UP (ref 8.4–10.5)
CHLORIDE SERPL-SCNC: 96 MMOL/L — LOW (ref 98–107)
CO2 SERPL-SCNC: 25 MMOL/L — SIGNIFICANT CHANGE UP (ref 22–31)
CREAT SERPL-MCNC: 0.91 MG/DL — SIGNIFICANT CHANGE UP (ref 0.5–1.3)
GLUCOSE SERPL-MCNC: 83 MG/DL — SIGNIFICANT CHANGE UP (ref 70–99)
HCG SERPL-ACNC: <5 MIU/ML — SIGNIFICANT CHANGE UP
HCT VFR BLD CALC: 32.2 % — LOW (ref 34.5–45)
HGB BLD-MCNC: 9.6 G/DL — LOW (ref 11.5–15.5)
INR BLD: 1.41 RATIO — HIGH (ref 0.88–1.16)
MAGNESIUM SERPL-MCNC: 2.5 MG/DL — SIGNIFICANT CHANGE UP (ref 1.6–2.6)
MCHC RBC-ENTMCNC: 20.2 PG — LOW (ref 27–34)
MCHC RBC-ENTMCNC: 29.8 GM/DL — LOW (ref 32–36)
MCV RBC AUTO: 67.6 FL — LOW (ref 80–100)
NRBC # BLD: 0 /100 WBCS — SIGNIFICANT CHANGE UP
NRBC # FLD: 0 K/UL — SIGNIFICANT CHANGE UP
PHOSPHATE SERPL-MCNC: 3.8 MG/DL — SIGNIFICANT CHANGE UP (ref 2.5–4.5)
PLATELET # BLD AUTO: 460 K/UL — HIGH (ref 150–400)
POTASSIUM SERPL-MCNC: 4.2 MMOL/L — SIGNIFICANT CHANGE UP (ref 3.5–5.3)
POTASSIUM SERPL-SCNC: 4.2 MMOL/L — SIGNIFICANT CHANGE UP (ref 3.5–5.3)
PROT SERPL-MCNC: 8.5 G/DL — HIGH (ref 6–8.3)
PROTHROM AB SERPL-ACNC: 16 SEC — HIGH (ref 10.6–13.6)
RBC # BLD: 4.76 M/UL — SIGNIFICANT CHANGE UP (ref 3.8–5.2)
RBC # FLD: 16.1 % — HIGH (ref 10.3–14.5)
RH IG SCN BLD-IMP: POSITIVE — SIGNIFICANT CHANGE UP
RH IG SCN BLD-IMP: POSITIVE — SIGNIFICANT CHANGE UP
SODIUM SERPL-SCNC: 135 MMOL/L — SIGNIFICANT CHANGE UP (ref 135–145)
UFH PPP CHRO-ACNC: 0.12 IU/ML — LOW (ref 0.3–0.7)
WBC # BLD: 11.47 K/UL — HIGH (ref 3.8–10.5)
WBC # FLD AUTO: 11.47 K/UL — HIGH (ref 3.8–10.5)

## 2021-01-13 PROCEDURE — 93010 ELECTROCARDIOGRAM REPORT: CPT

## 2021-01-13 PROCEDURE — 99232 SBSQ HOSP IP/OBS MODERATE 35: CPT | Mod: GC

## 2021-01-13 RX ORDER — FONDAPARINUX SODIUM 2.5 MG/.5ML
7.5 INJECTION, SOLUTION SUBCUTANEOUS DAILY
Refills: 0 | Status: DISCONTINUED | OUTPATIENT
Start: 2021-01-13 | End: 2021-01-14

## 2021-01-13 RX ORDER — SODIUM CHLORIDE 9 MG/ML
1000 INJECTION, SOLUTION INTRAVENOUS
Refills: 0 | Status: DISCONTINUED | OUTPATIENT
Start: 2021-01-13 | End: 2021-01-14

## 2021-01-13 RX ADMIN — LIDOCAINE 1 PATCH: 4 CREAM TOPICAL at 07:42

## 2021-01-13 RX ADMIN — SENNA PLUS 1 TABLET(S): 8.6 TABLET ORAL at 13:26

## 2021-01-13 RX ADMIN — LIDOCAINE 1 PATCH: 4 CREAM TOPICAL at 00:40

## 2021-01-13 RX ADMIN — OXYCODONE HYDROCHLORIDE 10 MILLIGRAM(S): 5 TABLET ORAL at 17:35

## 2021-01-13 RX ADMIN — Medication 975 MILLIGRAM(S): at 04:25

## 2021-01-13 RX ADMIN — Medication 975 MILLIGRAM(S): at 09:27

## 2021-01-13 RX ADMIN — LIDOCAINE 1 PATCH: 4 CREAM TOPICAL at 13:26

## 2021-01-13 RX ADMIN — POLYETHYLENE GLYCOL 3350 17 GRAM(S): 17 POWDER, FOR SOLUTION ORAL at 15:46

## 2021-01-13 RX ADMIN — OXYCODONE HYDROCHLORIDE 10 MILLIGRAM(S): 5 TABLET ORAL at 21:19

## 2021-01-13 RX ADMIN — LIDOCAINE 1 PATCH: 4 CREAM TOPICAL at 00:41

## 2021-01-13 RX ADMIN — LIDOCAINE 1 PATCH: 4 CREAM TOPICAL at 20:07

## 2021-01-13 RX ADMIN — OXYCODONE HYDROCHLORIDE 10 MILLIGRAM(S): 5 TABLET ORAL at 04:25

## 2021-01-13 RX ADMIN — Medication 5 MILLIGRAM(S): at 21:19

## 2021-01-13 RX ADMIN — SODIUM CHLORIDE 75 MILLILITER(S): 9 INJECTION, SOLUTION INTRAVENOUS at 09:26

## 2021-01-13 RX ADMIN — OXYCODONE HYDROCHLORIDE 10 MILLIGRAM(S): 5 TABLET ORAL at 13:26

## 2021-01-13 RX ADMIN — POLYETHYLENE GLYCOL 3350 17 GRAM(S): 17 POWDER, FOR SOLUTION ORAL at 04:25

## 2021-01-13 RX ADMIN — FONDAPARINUX SODIUM 7.5 MILLIGRAM(S): 2.5 INJECTION, SOLUTION SUBCUTANEOUS at 19:02

## 2021-01-13 RX ADMIN — Medication 975 MILLIGRAM(S): at 21:19

## 2021-01-13 NOTE — CHART NOTE - NSCHARTNOTEFT_GEN_A_CORE
Preop Dx: Bilateral Illiac deep vein thrombosis  Surgeon: Dr. Pham  Procedure: Thrombectomy    Vital Signs Last 24 Hrs  T(C): 37 (2021 22:00), Max: 37.2 (2021 13:55)  T(F): 98.6 (2021 22:00), Max: 99 (2021 13:55)  HR: 108 (2021 22:00) (108 - 110)  BP: 125/64 (2021 22:00) (117/70 - 125/64)  BP(mean): --  RR: 18 (2021 22:00) (18 - 19)  SpO2: 100% (2021 22:00) (98% - 100%)                        9.0    11.90 )-----------( 430      ( 2021 07:53 )             29.7     01-12    135  |  95<L>  |  11  ----------------------------<  88  4.2   |  24  |  0.88    Ca    9.5      2021 07:53  Phos  3.5     01-12  Mg     2.3     -12    TPro  8.5<H>  /  Alb  3.4  /  TBili  0.5  /  DBili  x   /  AST  20  /  ALT  31  /  AlkPhos  94  01-12      Daily     Daily     EK21  CT Angio chest: IMPRESSION:  Bilateral lobar pulmonary embolism extending into the segmental branches with left lower lobe pulmonary infarct, as above.    Bilateral common iliac DVT extending into the bilateral internal iliac veins.    Left lower pole renal infarct.    Cholelithiasis without cholecystitis.    Comment: Coexistence of pulmonary embolism/infarct and renal infarct may suggest the presence of right to left shunt. Further evaluation with echocardiogram is recommended.    Type and Screen:         A/P:  24y/o F hx morbid obesity p/w new PE, common iliac DVT and renal infarct i/s/o PFO and OCP use. Now on AC, pending hematologic workup.       - OR 21 for Thrombectomy with Dr. Pham  - NPO past midnight, except medications  - IVF while NPO  - Medical clearance for OR

## 2021-01-13 NOTE — PROGRESS NOTE ADULT - PROBLEM SELECTOR PLAN 5
- PT pending  - Likely home  - Will need follow-up with an anticoagulation and thrombosis clinic - pending PT  - Will need follow-up with an anticoagulation and thrombosis clinic

## 2021-01-13 NOTE — CHART NOTE - NSCHARTNOTEFT_GEN_A_CORE
After discussion with hematology, vascular surgery to cancel case for today. Risks of proceeding on current anticoagulation outweigh benefits.  This was discussed with patient by the vascular surgery fellow Dr. Nichols.

## 2021-01-13 NOTE — PROGRESS NOTE ADULT - ASSESSMENT
25F with no previous medical history other than tonsilectomy 2 weeks ago presenting with 1 day of back, lower left leg, chest, and arm pain found to have b/l lobar PE, common iliac DVT extending into internal iliac vein, and left lower pole renal infarct.     #B/L Lobar PE  #Common/Internal Iliac Vein DVT   #Left Lower Pole Renal Infarct   #Acute DVT extending from the left calf veins, above the knee, to the level of the left external iliac vein  #PFO    -DVT/PE, likely in the setting of obesity, OCP use, and immobilization after surgery along with possible PFO, but need to r/o catastrophic APLS (unlikely per Rheum and given typical APLS work up negative) given new acute thrombus in lower extremity while anticoagulated with heparin gtt. Other ddx includes myeloproliferative disorder, malignancy, covid.  - C/w Fondaparinux 10mg subq daily, can transition to coumadin for goal INR 2-3 and follow up outpatient. Discussed with patient option of fundapariunaux injection vs coumadin which patient would prefer coumadin. Although DOAC preferred and typical APLS negative, possibility of atypical APLS which can take time for the labs to result therefore safest choice of anticoagulation would be coumadin when cleared from vascular after procedure.   -Vascular planning on thrombectomy, HOLD fundaparinaux which typically needs 36-48hrs held prior to procedure. Last dose 5pm 1/12/21. Recommend if not urgent to wait until 1/14 for procedure. Fundaparinaux will require reversal if vascular believes this is urgent which would be PCC or rFVII. These reversal agents are very prothrombotic and if procedure can wait would not give these reversal agents. Will check an ant-Xa level to assess her anticoagulation status. May need to start heparin in the interim to have at least some anticoagulation prior to procedure. Waiting for vascular if plan to take today at 2pm scheduled time or another date.   -COVID negative   -Rheumatology consulted to r/o possible catastrophic APLS (doubt however atypical APLS labs sent)  -Anticardiolipin positive however IgG titre 15.2 which is not clinically significant for typical APLS, LA neg  -Please send  PNH antigen, JACK 2, NIGHAT, MPL, BCR/ABL,   -HIT Ab low positive and ROE negative- not LEOBARDO   -Please send factor V Leiden, prothrombin gene mutation  -UA protein negative   -HIT PF4ab 0.4, not positive or clinically significant and platelet count normal/high  -Will need to r/o malignancy, please find out which hospital patient had tonsillectomy may need to ask pathology to recheck the specimen. Primary team updates?  -avoid estrogen containing OCP use in the future   -Please consult IR/ vascular for possibility of thrombectomy vs TPA: vascular following   -Protein C and S low in the setting of acute clot   -ATIII normal (can also be low while on heparin)   -TTE with bubble study showing bubbles in LV, possible PFO, REJI confirmed PFO and cardiology f/u. Discussed with cardiology again considering she clotted through the heparin and PFO small and in the setting of acute clot typically do not offer closure and it could increased cardiac pressure   -will need to follow up at [facility] after discharge. She can follow up the rest of hypercoaguable work up including MPN work up outpatient.       Adam Rivera MD  Hematology/Oncology Fellow   pager [phone]  After 5pm and on weekends page on call fellow

## 2021-01-13 NOTE — PROGRESS NOTE ADULT - ASSESSMENT
25 y.o. 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 Followed by heme/onc, vascular, rheumatology, currently undergoing hypercoagulability work up. Vascular surgery planning for thrombectomy 25 y.o. 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 Followed by heme/onc, vascular, rheumatology, currently undergoing hypercoagulability work up. Vascular surgery planned for thrombectomy, however later decided against procedure.

## 2021-01-13 NOTE — PROGRESS NOTE ADULT - SUBJECTIVE AND OBJECTIVE BOX
INTERVAL HPI/OVERNIGHT EVENTS:  Patient S&E at bedside. No overnight events.   Vascular surgery had last minute opening in OR inquiring regarding fundaparinaux and timing of procedure.     VITAL SIGNS:  T(F): 98.6 (01-12-21 @ 22:00)  HR: 108 (01-12-21 @ 22:00)  BP: 125/64 (01-12-21 @ 22:00)  RR: 18 (01-12-21 @ 22:00)  SpO2: 100% (01-12-21 @ 22:00)  Wt(kg): --    PHYSICAL EXAM:    Constitutional: NAD  Eyes: EOMI, sclera non-icteric  Neck: supple  Respiratory: CTAB, no wheezes or crackles   Cardiovascular: RRR  Gastrointestinal: soft, NTND, + BS  Extremities: no cyanosis, clubbing or edema   Neurological: awake and alert      MEDICATIONS  (STANDING):  acetaminophen   Tablet .. 975 milliGRAM(s) Oral every 8 hours  bisacodyl 5 milliGRAM(s) Oral at bedtime  dextrose 5% + lactated ringers. 1000 milliLiter(s) (75 mL/Hr) IV Continuous <Continuous>  influenza   Vaccine 0.5 milliLiter(s) IntraMuscular once  lidocaine   Patch 1 Patch Transdermal daily  lidocaine   Patch 1 Patch Transdermal daily  polyethylene glycol 3350 17 Gram(s) Oral two times a day  senna 1 Tablet(s) Oral daily    MEDICATIONS  (PRN):  oxyCODONE    IR 10 milliGRAM(s) Oral every 4 hours PRN Severe Pain (7 - 10)  oxyCODONE    IR 5 milliGRAM(s) Oral every 4 hours PRN Moderate Pain (4 - 6)      Allergies    No Known Allergies    Intolerances        LABS:                        9.6    11.47 )-----------( 460      ( 13 Jan 2021 07:14 )             32.2     01-13    135  |  96<L>  |  12  ----------------------------<  83  4.2   |  25  |  0.91    Ca    9.7      13 Jan 2021 07:14  Phos  3.8     01-13  Mg     2.5     01-13    TPro  8.5<H>  /  Alb  3.4  /  TBili  0.5  /  DBili  x   /  AST  36<H>  /  ALT  40<H>  /  AlkPhos  124<H>  01-13    PT/INR - ( 13 Jan 2021 06:49 )   PT: 16.0 sec;   INR: 1.41 ratio         PTT - ( 13 Jan 2021 06:49 )  PTT:32.8 sec      RADIOLOGY & ADDITIONAL TESTS:  Studies reviewed.

## 2021-01-13 NOTE — PROGRESS NOTE ADULT - ATTENDING COMMENTS
25 y.o. F w/ a hx of recent tonsillectomy, on OCP's hospitalized for extensive VTE (bilateral common iliac DVT, b/l PE w/ pulmonary and renal infarct) currently on Fondaparinux.     # Acute hypoxic respiratory failure 2/2 PE, DVT: Pt developed additional DVT while on Heparin. Hypercoagulable workup pending. PFO seen on REJI, f/u OP. Vascular c/s for possible thrombectomy though last dose of Fondaparinux was last night at 5pm. Factor Xa pending. Hematology following.     I have personally seen, examined and participated in the care of this patient. I have reviewed all pertinent clinical information, including history, physical exam, plan and the resident's note and agree except as noted.

## 2021-01-13 NOTE — PROGRESS NOTE ADULT - PROBLEM SELECTOR PLAN 2
likely provoked by recent surgery and OCP use  - anticoagulated on heparin with therapeutic aptt  - repeat LLE US showed new DVT on hep gtt. Per discussion with heme, wouldn't call it AC failure given patient has been on AC since 1/2. However will leave formal recs  - per vascular tentative plan for thrombectomy  - Pain management as below  - F/u coagulapathy workup likely provoked by recent surgery and OCP use  - anticoagulated on heparin with therapeutic aptt  - repeat LLE US showed new DVT on hep gtt. Per discussion with heme, wouldn't call it AC failure given patient has been on AC since 1/2. However will leave formal recs  - vascular planned thrombectomy today, later decided against procedure. will continue fondaparinux  - Pain management as below  - F/u coagulapathy workup

## 2021-01-13 NOTE — CHART NOTE - NSCHARTNOTEFT_GEN_A_CORE
#Pre-op clearance    RCRI score of 1 with class II risk. 6% 30-day risk of death, MI, or cardiac arrest  METs>4( pt able to comfortably climb more than two flight of stairs, walk up hill, run for long periods of time and participate in athletic activities such as cheerleading)   Pt is medically optimized for intermediate risk surgery #Pre-op risk assessment    RCRI score of 1 with class II risk. 6% 30-day risk of death, MI, or cardiac arrest  METs>4( pt able to comfortably climb more than two flight of stairs, walk up hill, run for long periods of time and participate in athletic activities such as cheerleading)   Pt is medically optimized for intermediate risk surgery

## 2021-01-13 NOTE — PROGRESS NOTE ADULT - SUBJECTIVE AND OBJECTIVE BOX
PROGRESS NOTE:   Evon Yanes MD   Internal Medicine PGY-1  [facility] [id number] / [phone]    Patient is a 25y old  Female who presents with a chief complaint of DVT + PE + renal infarct (12 Jan 2021 14:53)    SUBJECTIVE / OVERNIGHT EVENTS: No acute events overnight. Vascular surgery planning for thrombectomy today    ADDITIONAL REVIEW OF SYSTEMS:    MEDICATIONS  (STANDING):  acetaminophen   Tablet .. 975 milliGRAM(s) Oral every 8 hours  bisacodyl 5 milliGRAM(s) Oral at bedtime  fondaparinux Injectable 7.5 milliGRAM(s) SubCutaneous daily  influenza   Vaccine 0.5 milliLiter(s) IntraMuscular once  lidocaine   Patch 1 Patch Transdermal daily  lidocaine   Patch 1 Patch Transdermal daily  polyethylene glycol 3350 17 Gram(s) Oral two times a day  senna 1 Tablet(s) Oral daily    MEDICATIONS  (PRN):  oxyCODONE    IR 10 milliGRAM(s) Oral every 4 hours PRN Severe Pain (7 - 10)  oxyCODONE    IR 5 milliGRAM(s) Oral every 4 hours PRN Moderate Pain (4 - 6)    PHYSICAL EXAM:  Vital Signs Last 24 Hrs  T(C): 37 (12 Jan 2021 22:00), Max: 37.2 (12 Jan 2021 13:55)  T(F): 98.6 (12 Jan 2021 22:00), Max: 99 (12 Jan 2021 13:55)  HR: 108 (12 Jan 2021 22:00) (108 - 110)  BP: 125/64 (12 Jan 2021 22:00) (117/70 - 125/64)  RR: 18 (12 Jan 2021 22:00) (18 - 19)  SpO2: 100% (12 Jan 2021 22:00) (98% - 100%)    CONSTITUTIONAL: NAD, lying in bed comfortably  EYES: EOMI, conjunctiva and sclera clear  ENMT: Moist oral mucosa; normal dentition  NECK: Supple, no palpable masses  RESPIRATORY: Lungs clear to ascultation b/l; No rales, rhonchi, or wheezing; Unlabored respirations  CARDIOVASCULAR: +tachycardic, Regular rate and rhythm, normal S1 and S2, no murmurs, rubs, or gallops  ABDOMEN: Soft, nontender, nondistended, normal bowel sounds  MUSCULOSKELETAL: LLE swelling left > right. DP pulses palpable  PSYCH: Tearful at times  NEUROLOGY: AAOx3, CNs grossly intact  SKIN: No rashes; no palpable lesions      LABS:                        9.6    11.47 )-----------( 460      ( 13 Jan 2021 07:14 )             32.2     01-13    135  |  96<L>  |  12  ----------------------------<  83  4.2   |  25  |  0.91    Ca    9.7      13 Jan 2021 07:14  Phos  3.8     01-13  Mg     2.5     01-13    TPro  8.5<H>  /  Alb  3.4  /  TBili  0.5  /  DBili  x   /  AST  36<H>  /  ALT  40<H>  /  AlkPhos  124<H>  01-13    PT/INR - ( 13 Jan 2021 06:49 )   PT: 16.0 sec;   INR: 1.41 ratio         PTT - ( 13 Jan 2021 06:49 )  PTT:32.8 sec            RADIOLOGY & ADDITIONAL TESTS:  Results Reviewed: Y  Imaging Personally Reviewed: Y  Electrocardiogram Personally Reviewed: Y    COORDINATION OF CARE:  Care Discussed with Consultants/Other Providers [Y/N]:   Prior or Outpatient Records Reviewed [Y/N]:    PROGRESS NOTE:   Evon Yanes MD   Internal Medicine PGY-1  [place] [phone] / [phone]    Patient is a 25y old  Female who presents with a chief complaint of DVT + PE + renal infarct (12 Jan 2021 14:53)    SUBJECTIVE / OVERNIGHT EVENTS: No acute events overnight. Had BM overnight.    ADDITIONAL REVIEW OF SYSTEMS:  CONSTITUTIONAL: No weakness, fevers or chills  NECK: No pain or stiffness  RESPIRATORY: No shortness of breath  CARDIOVASCULAR: No chest pain or palpitations  GASTROINTESTINAL: No abdominal pain; nausea, vomiting;  diarrhea  GENITOURINARY: No dysuria  NEUROLOGICAL: No numbness or weakness  SKIN: No rashes    MEDICATIONS  (STANDING):  acetaminophen   Tablet .. 975 milliGRAM(s) Oral every 8 hours  bisacodyl 5 milliGRAM(s) Oral at bedtime  fondaparinux Injectable 7.5 milliGRAM(s) SubCutaneous daily  influenza   Vaccine 0.5 milliLiter(s) IntraMuscular once  lidocaine   Patch 1 Patch Transdermal daily  lidocaine   Patch 1 Patch Transdermal daily  polyethylene glycol 3350 17 Gram(s) Oral two times a day  senna 1 Tablet(s) Oral daily    MEDICATIONS  (PRN):  oxyCODONE    IR 10 milliGRAM(s) Oral every 4 hours PRN Severe Pain (7 - 10)  oxyCODONE    IR 5 milliGRAM(s) Oral every 4 hours PRN Moderate Pain (4 - 6)    PHYSICAL EXAM:  Vital Signs Last 24 Hrs  T(C): 37 (12 Jan 2021 22:00), Max: 37.2 (12 Jan 2021 13:55)  T(F): 98.6 (12 Jan 2021 22:00), Max: 99 (12 Jan 2021 13:55)  HR: 108 (12 Jan 2021 22:00) (108 - 110)  BP: 125/64 (12 Jan 2021 22:00) (117/70 - 125/64)  RR: 18 (12 Jan 2021 22:00) (18 - 19)  SpO2: 100% (12 Jan 2021 22:00) (98% - 100%)    CONSTITUTIONAL: NAD, lying in bed comfortably  EYES: EOMI, conjunctiva and sclera clear  ENMT: Moist oral mucosa; normal dentition  NECK: Supple, no palpable masses  RESPIRATORY: Lungs clear to ascultation b/l; No rales, rhonchi, or wheezing; Unlabored respirations  CARDIOVASCULAR: +tachycardic, Regular rate and rhythm, normal S1 and S2, no murmurs, rubs, or gallops  ABDOMEN: Soft, nontender, nondistended, normal bowel sounds  MUSCULOSKELETAL: LLE swelling left > right. DP pulses palpable  PSYCH: Tearful at times  NEUROLOGY: AAOx3, CNs grossly intact  SKIN: No rashes; no palpable lesions    LABS:             9.6    11.47 )-----------( 460      ( 13 Jan 2021 07:14 )             32.2     01-13    135  |  96<L>  |  12  ----------------------------<  83  4.2   |  25  |  0.91    Ca    9.7      13 Jan 2021 07:14  Phos  3.8     01-13  Mg     2.5     01-13    TPro  8.5<H>  /  Alb  3.4  /  TBili  0.5  /  DBili  x   /  AST  36<H>  /  ALT  40<H>  /  AlkPhos  124<H>  01-13    PT/INR - ( 13 Jan 2021 06:49 )   PT: 16.0 sec;   INR: 1.41 ratio       PTT - ( 13 Jan 2021 06:49 )  PTT:32.8 sec    RADIOLOGY & ADDITIONAL TESTS:  Results Reviewed: Y  Imaging Personally Reviewed: Y  Electrocardiogram Personally Reviewed: Y    COORDINATION OF CARE:  Care Discussed with Consultants/Other Providers [Y/N]:   Prior or Outpatient Records Reviewed [Y/N]:

## 2021-01-14 ENCOUNTER — TRANSCRIPTION ENCOUNTER (OUTPATIENT)
Age: 26
End: 2021-01-14

## 2021-01-14 VITALS
DIASTOLIC BLOOD PRESSURE: 69 MMHG | OXYGEN SATURATION: 97 % | TEMPERATURE: 98 F | HEART RATE: 110 BPM | SYSTOLIC BLOOD PRESSURE: 111 MMHG | RESPIRATION RATE: 17 BRPM

## 2021-01-14 PROBLEM — Z00.00 ENCOUNTER FOR PREVENTIVE HEALTH EXAMINATION: Status: ACTIVE | Noted: 2021-01-14

## 2021-01-14 LAB
CULTURE RESULTS: SIGNIFICANT CHANGE UP
CULTURE RESULTS: SIGNIFICANT CHANGE UP
JAK2 P.V617F BLD/T QL: SIGNIFICANT CHANGE UP
SPECIMEN SOURCE: SIGNIFICANT CHANGE UP
SPECIMEN SOURCE: SIGNIFICANT CHANGE UP

## 2021-01-14 PROCEDURE — 99238 HOSP IP/OBS DSCHRG MGMT 30/<: CPT | Mod: GC

## 2021-01-14 RX ORDER — OXYCODONE HYDROCHLORIDE 5 MG/1
1 TABLET ORAL
Qty: 30 | Refills: 0
Start: 2021-01-14 | End: 2021-01-18

## 2021-01-14 RX ORDER — FONDAPARINUX SODIUM 2.5 MG/.5ML
7.5 INJECTION, SOLUTION SUBCUTANEOUS
Qty: 225 | Refills: 0
Start: 2021-01-14 | End: 2021-02-12

## 2021-01-14 RX ORDER — POLYETHYLENE GLYCOL 3350 17 G/17G
17 POWDER, FOR SOLUTION ORAL
Qty: 238 | Refills: 0
Start: 2021-01-14 | End: 2021-01-27

## 2021-01-14 RX ADMIN — LIDOCAINE 1 PATCH: 4 CREAM TOPICAL at 10:00

## 2021-01-14 RX ADMIN — OXYCODONE HYDROCHLORIDE 10 MILLIGRAM(S): 5 TABLET ORAL at 04:21

## 2021-01-14 RX ADMIN — OXYCODONE HYDROCHLORIDE 10 MILLIGRAM(S): 5 TABLET ORAL at 09:56

## 2021-01-14 RX ADMIN — LIDOCAINE 1 PATCH: 4 CREAM TOPICAL at 00:36

## 2021-01-14 RX ADMIN — FONDAPARINUX SODIUM 7.5 MILLIGRAM(S): 2.5 INJECTION, SOLUTION SUBCUTANEOUS at 10:01

## 2021-01-14 RX ADMIN — POLYETHYLENE GLYCOL 3350 17 GRAM(S): 17 POWDER, FOR SOLUTION ORAL at 04:21

## 2021-01-14 RX ADMIN — LIDOCAINE 1 PATCH: 4 CREAM TOPICAL at 00:37

## 2021-01-14 RX ADMIN — OXYCODONE HYDROCHLORIDE 10 MILLIGRAM(S): 5 TABLET ORAL at 18:15

## 2021-01-14 RX ADMIN — LIDOCAINE 1 PATCH: 4 CREAM TOPICAL at 10:03

## 2021-01-14 RX ADMIN — LIDOCAINE 1 PATCH: 4 CREAM TOPICAL at 08:37

## 2021-01-14 RX ADMIN — Medication 975 MILLIGRAM(S): at 12:09

## 2021-01-14 NOTE — PHYSICAL THERAPY INITIAL EVALUATION ADULT - GENERAL OBSERVATIONS, REHAB EVAL
Patient received semi supine in bed, swelling of lower extremities ,inability to bear weight on LLE due to pain and weakness.RN aware.

## 2021-01-14 NOTE — PROGRESS NOTE ADULT - PROBLEM SELECTOR PROBLEM 1
Acute pulmonary embolism, unspecified pulmonary embolism type, unspecified whether acute cor pulmonale present

## 2021-01-14 NOTE — DISCHARGE NOTE NURSING/CASE MANAGEMENT/SOCIAL WORK - NSDCPEARIXTRACOMP_GEN_ALL_CORE
Fondaparinux/Arixtra is used to treat and prevent blood clots. Alternate sides of your abdomen for each injection. After you remove the needle guard, do not let the needle touch anything before you inject the medicine – this will help prevent infection. Never skip a dose of Fondaparinux/Arixtra. If you forget to take your Fondaparinux/Arixtra, take a dose as soon as you remember. If it is almost time for your next dose, wait until then and take a regular dose. NEVER TAKE A DOUBLE DOSE. Notify your doctor that you missed a dose.   Take Fondaparinux/Arixtra at the same time each day.

## 2021-01-14 NOTE — DISCHARGE NOTE NURSING/CASE MANAGEMENT/SOCIAL WORK - NSDCPEARIXTRADIET_GEN_ALL_CORE
Eat healthy foods you enjoy. Fondaparinux/Arixtra DOES NOT have a special diet. Limit your alcohol intake.

## 2021-01-14 NOTE — PROGRESS NOTE ADULT - PROVIDER SPECIALTY LIST ADULT
Rheumatology
Vascular Surgery
Heme/Onc
Internal Medicine
Vascular Surgery
Heme/Onc
Vascular Surgery
Internal Medicine

## 2021-01-14 NOTE — PROGRESS NOTE ADULT - REASON FOR ADMISSION
DVT + PE + renal infarct

## 2021-01-14 NOTE — PHYSICAL THERAPY INITIAL EVALUATION ADULT - PERTINENT HX OF CURRENT PROBLEM, REHAB EVAL
This is a 25 y.o. F w/ a hx of recent tonsillectomy, on OCP's p/w SOB, CP, Back pain, and leg pain found to have extensive VTE (bilateral common iliac DVT, b/l PE w/ pulmonary and renal infarct)

## 2021-01-14 NOTE — PROGRESS NOTE ADULT - ATTENDING COMMENTS
25 y.o. F w/ a hx of recent tonsillectomy, on OCP's hospitalized for extensive VTE (bilateral common iliac DVT, b/l PE w/ pulmonary and renal infarct) currently on Fondaparinux.     # Acute hypoxic respiratory failure 2/2 PE, DVT: Pt developed additional DVT while on Heparin. Hypercoagulable workup pending. PFO seen on REJI, f/u OP. No thrombectomy as sx are improving. Plan for D/C home today. F/u OP with Cardiology, Hematology, Rheumatology and PCP. Will provide 1 week of opioid script, will schedule a PCP appt within a week for refill if necessary.     I have personally seen, examined and participated in the care of this patient. I have reviewed all pertinent clinical information, including history, physical exam, plan and the resident's note and agree except as noted. 25 y.o. F w/ a hx of recent tonsillectomy, on OCP's hospitalized for extensive VTE (bilateral common iliac DVT, b/l PE w/ pulmonary and renal infarct) currently on Fondaparinux.     # Acute hypoxic respiratory failure 2/2 PE, DVT: Pt developed additional DVT while on Heparin. Hypercoagulable workup pending. PFO seen on REJI, f/u OP. No thrombectomy as sx are improving. Plan for D/C home today. F/u OP with Cardiology, Hematology, Rheumatology and PCP. Will provide 1 week of opioid script, will schedule a PCP appt within a week for refill if necessary. Pathology slides and report requested.     I have personally seen, examined and participated in the care of this patient. I have reviewed all pertinent clinical information, including history, physical exam, plan and the resident's note and agree except as noted.

## 2021-01-14 NOTE — DISCHARGE NOTE NURSING/CASE MANAGEMENT/SOCIAL WORK - NSDCPEARIXTRA_GEN_ALL_CORE
Fondaparinux/Arixtra - Compliance/Fondaparinux/Arixtra - Dietary Advice/Fondaparinux/Arixtra - Follow up monitoring/Fondaparinux/Arixtra - Potential for adverse drug reactions and interactions

## 2021-01-14 NOTE — PROGRESS NOTE ADULT - PROBLEM SELECTOR PROBLEM 3
Renal infarct

## 2021-01-14 NOTE — PROGRESS NOTE ADULT - PROBLEM SELECTOR PROBLEM 4
Pain management

## 2021-01-14 NOTE — PHYSICAL THERAPY INITIAL EVALUATION ADULT - LIVES WITH, PROFILE
ayt home , 5 steps to enter , bedroom is in the 2nd floor ,however patient will remain in the guest room in the main floor until gets better,/parents

## 2021-01-14 NOTE — PHYSICAL THERAPY INITIAL EVALUATION ADULT - ACTIVE RANGE OF MOTION EXAMINATION, REHAB EVAL
except limited ROM on LLE due to anticipating pain/bilateral upper extremity Active ROM was WFL (within functional limits)/bilateral  lower extremity Active ROM was WFL (within functional limits)

## 2021-01-14 NOTE — PROGRESS NOTE ADULT - PROBLEM SELECTOR PLAN 1
CT angio w/ bilateral PE  - likely provoked in setting of recent surgery and immobility  - O2 support as needed  - Pain management as below  - TTE showing possible R to L shunt, REJI confirmed (1/6), cardiology recs appreciated; no indication for acute closure at the time  - heme consulted for coagulopathy and anemia.   - APLS labs are negative can be discharged on a DOAC (anticardiolipin ab positive, IgG however only 15.2 titre, not significant).  - will need 6 months of treatment  - heme/onc recs appreciated  - rheum recs appreciated, f/u hypercoagulability work up  - continue fondaparinux 7.5 qD, dosing confirmed by pharmacy 1/12 CT angio w/ bilateral PE  - likely provoked in setting of recent surgery and immobility  - O2 support as needed  - Pain management as below  - TTE showing possible R to L shunt, REJI confirmed (1/6), cardiology recs appreciated; no indication for acute closure at the time  - heme consulted for coagulopathy and anemia.   - APLS labs are negative can be discharged on a DOAC (anticardiolipin ab positive, IgG however only 15.2 titre, not significant).  - will need 6 months of treatment  - heme/onc recs appreciated, cleared for D/C on fondaparinux 7.5 mg qD, heme will call patient for appointment  - rheum recs appreciated, cleared for D/C, rheum will call patient for appointment. continue hypercoagulability work up  - continue fondaparinux 7.5 qD, dosing confirmed by pharmacy 1/12

## 2021-01-14 NOTE — DISCHARGE NOTE NURSING/CASE MANAGEMENT/SOCIAL WORK - PATIENT PORTAL LINK FT
You can access the FollowMyHealth Patient Portal offered by [facility] by registering at the following website: [email or web address]. By joining [facility]’s FollowMyHealth portal, you will also be able to view your health information using other applications (apps) compatible with our system.

## 2021-01-14 NOTE — PROGRESS NOTE ADULT - SUBJECTIVE AND OBJECTIVE BOX
PROGRESS NOTE:   Evon Yanes MD   Internal Medicine PGY-1  [place] [id number] / [phone]    Patient is a 25y old  Female who presents with a chief complaint of DVT + PE + renal infarct (13 Jan 2021 09:31)    SUBJECTIVE / OVERNIGHT EVENTS: no acute events overnight    ADDITIONAL REVIEW OF SYSTEMS:  CONSTITUTIONAL: No weakness, fevers or chills  EYES/ENT: No throat pain   NECK: No pain or stiffness  RESPIRATORY: No cough, wheezing, shortness of breath  CARDIOVASCULAR: No chest pain or palpitations  GASTROINTESTINAL: No abdominal pain; nausea, vomiting;  diarrhea or constipation  GENITOURINARY: No dysuria, frequency  NEUROLOGICAL: No numbness or weakness  SKIN: No rashes    MEDICATIONS  (STANDING):  acetaminophen   Tablet .. 975 milliGRAM(s) Oral every 8 hours  bisacodyl 5 milliGRAM(s) Oral at bedtime  dextrose 5% + lactated ringers. 1000 milliLiter(s) (75 mL/Hr) IV Continuous <Continuous>  fondaparinux Injectable 7.5 milliGRAM(s) SubCutaneous daily  influenza   Vaccine 0.5 milliLiter(s) IntraMuscular once  lidocaine   Patch 1 Patch Transdermal daily  lidocaine   Patch 1 Patch Transdermal daily  polyethylene glycol 3350 17 Gram(s) Oral two times a day  senna 1 Tablet(s) Oral daily    MEDICATIONS  (PRN):  oxyCODONE    IR 10 milliGRAM(s) Oral every 4 hours PRN Severe Pain (7 - 10)  oxyCODONE    IR 5 milliGRAM(s) Oral every 4 hours PRN Moderate Pain (4 - 6)    PHYSICAL EXAM:  Vital Signs Last 24 Hrs  T(C): 37.2 (13 Jan 2021 21:00), Max: 37.2 (13 Jan 2021 21:00)  T(F): 98.9 (13 Jan 2021 21:00), Max: 98.9 (13 Jan 2021 21:00)  HR: 111 (13 Jan 2021 21:00) (106 - 111)  BP: 115/71 (13 Jan 2021 21:00) (114/79 - 115/71)  RR: 18 (13 Jan 2021 21:00) (18 - 18)  SpO2: 100% (13 Jan 2021 21:00) (100% - 100%)    GENERAL: WDWN, NAD  SKIN: No rashes  HEENT: NCAT, PERRL, MMM  LUNGS: Normal respiratory effort, CTAB, no w/r/r  CV: RRR, +S1S2, no m/g/r, no JVD  ABDOMEN: +BS, soft NTND  EXTREMITIES: no peripheral edema, 2+ distal pulses  NEURO: A&Ox3, MAEx4, no gross focal neurologic deficits  PSYCH: normal mood and affect    LABS:              9.6    11.47 )-----------( 460      ( 13 Jan 2021 07:14 )             32.2     01-13    135  |  96<L>  |  12  ----------------------------<  83  4.2   |  25  |  0.91    Ca    9.7      13 Jan 2021 07:14  Phos  3.8     01-13  Mg     2.5     01-13    TPro  8.5<H>  /  Alb  3.4  /  TBili  0.5  /  DBili  x   /  AST  36<H>  /  ALT  40<H>  /  AlkPhos  124<H>  01-13    PT/INR - ( 13 Jan 2021 06:49 )   PT: 16.0 sec;   INR: 1.41 ratio      PTT - ( 13 Jan 2021 06:49 )  PTT:32.8 sec    RADIOLOGY & ADDITIONAL TESTS:  Results Reviewed:   Imaging Personally Reviewed:   Electrocardiogram Personally Reviewed:     COORDINATION OF CARE:  Care Discussed with Consultants/Other Providers [Y/N]:   Prior or Outpatient Records Reviewed [Y/N]:    PROGRESS NOTE:   Evon Yanes MD   Internal Medicine PGY-1  [place] [id number] / [phone]    Patient is a 25y old  Female who presents with a chief complaint of DVT + PE + renal infarct (13 Jan 2021 09:31)    SUBJECTIVE / OVERNIGHT EVENTS: no acute events overnight. patient seen in AM, complaining of same LLE pain alleviated by oxycodone. Denied CP, SOB, abdominal pain/n/v.    MEDICATIONS  (STANDING):  acetaminophen   Tablet .. 975 milliGRAM(s) Oral every 8 hours  bisacodyl 5 milliGRAM(s) Oral at bedtime  dextrose 5% + lactated ringers. 1000 milliLiter(s) (75 mL/Hr) IV Continuous <Continuous>  fondaparinux Injectable 7.5 milliGRAM(s) SubCutaneous daily  influenza   Vaccine 0.5 milliLiter(s) IntraMuscular once  lidocaine   Patch 1 Patch Transdermal daily  lidocaine   Patch 1 Patch Transdermal daily  polyethylene glycol 3350 17 Gram(s) Oral two times a day  senna 1 Tablet(s) Oral daily    MEDICATIONS  (PRN):  oxyCODONE    IR 10 milliGRAM(s) Oral every 4 hours PRN Severe Pain (7 - 10)  oxyCODONE    IR 5 milliGRAM(s) Oral every 4 hours PRN Moderate Pain (4 - 6)    PHYSICAL EXAM:  Vital Signs Last 24 Hrs  T(C): 37.2 (13 Jan 2021 21:00), Max: 37.2 (13 Jan 2021 21:00)  T(F): 98.9 (13 Jan 2021 21:00), Max: 98.9 (13 Jan 2021 21:00)  HR: 111 (13 Jan 2021 21:00) (106 - 111)  BP: 115/71 (13 Jan 2021 21:00) (114/79 - 115/71)  RR: 18 (13 Jan 2021 21:00) (18 - 18)  SpO2: 100% (13 Jan 2021 21:00) (100% - 100%)    GENERAL: WDWN, NAD  SKIN: No rashes  HEENT: NCAT, PERRL, MMM  LUNGS: normal respiratory effort, CTAB, no w/r/r  CV: RRR, +S1S2, no m/g/r, no JVD  ABDOMEN: +BS, soft NTND  EXTREMITIES: no peripheral edema, 2+ distal pulses  NEURO: A&Ox3, MAEx4, no gross focal neurologic deficits  PSYCH: normal mood and affect    LABS:              9.6    11.47 )-----------( 460      ( 13 Jan 2021 07:14 )             32.2     01-13    135  |  96<L>  |  12  ----------------------------<  83  4.2   |  25  |  0.91    Ca    9.7      13 Jan 2021 07:14  Phos  3.8     01-13  Mg     2.5     01-13    TPro  8.5<H>  /  Alb  3.4  /  TBili  0.5  /  DBili  x   /  AST  36<H>  /  ALT  40<H>  /  AlkPhos  124<H>  01-13    PT/INR - ( 13 Jan 2021 06:49 )   PT: 16.0 sec;   INR: 1.41 ratio      PTT - ( 13 Jan 2021 06:49 )  PTT:32.8 sec    RADIOLOGY & ADDITIONAL TESTS:  Results Reviewed:   Imaging Personally Reviewed:   Electrocardiogram Personally Reviewed:     COORDINATION OF CARE:  Care Discussed with Consultants/Other Providers [Y/N]:   Prior or Outpatient Records Reviewed [Y/N]:    PROGRESS NOTE:   Evon Yanes MD   Internal Medicine PGY-1  [place] [id number] / [phone]    Patient is a 25y old  Female who presents with a chief complaint of DVT + PE + renal infarct (13 Jan 2021 09:31)    SUBJECTIVE / OVERNIGHT EVENTS: no acute events overnight. patient seen in AM, complaining of same LLE pain alleviated by oxycodone. Denied CP, SOB, abdominal pain/n/v.    MEDICATIONS  (STANDING):  acetaminophen   Tablet .. 975 milliGRAM(s) Oral every 8 hours  bisacodyl 5 milliGRAM(s) Oral at bedtime  dextrose 5% + lactated ringers. 1000 milliLiter(s) (75 mL/Hr) IV Continuous <Continuous>  fondaparinux Injectable 7.5 milliGRAM(s) SubCutaneous daily  influenza   Vaccine 0.5 milliLiter(s) IntraMuscular once  lidocaine   Patch 1 Patch Transdermal daily  lidocaine   Patch 1 Patch Transdermal daily  polyethylene glycol 3350 17 Gram(s) Oral two times a day  senna 1 Tablet(s) Oral daily    MEDICATIONS  (PRN):  oxyCODONE    IR 10 milliGRAM(s) Oral every 4 hours PRN Severe Pain (7 - 10)  oxyCODONE    IR 5 milliGRAM(s) Oral every 4 hours PRN Moderate Pain (4 - 6)    PHYSICAL EXAM:  Vital Signs Last 24 Hrs  T(C): 37.2 (13 Jan 2021 21:00), Max: 37.2 (13 Jan 2021 21:00)  T(F): 98.9 (13 Jan 2021 21:00), Max: 98.9 (13 Jan 2021 21:00)  HR: 111 (13 Jan 2021 21:00) (106 - 111)  BP: 115/71 (13 Jan 2021 21:00) (114/79 - 115/71)  RR: 18 (13 Jan 2021 21:00) (18 - 18)  SpO2: 100% (13 Jan 2021 21:00) (100% - 100%)    CONSTITUTIONAL: NAD, lying in bed comfortably  EYES: EOMI, conjunctiva and sclera clear  ENMT: Moist oral mucosa; normal dentition  NECK: Supple, no palpable masses  RESPIRATORY: Lungs clear to ascultation b/l; No rales, rhonchi, or wheezing; Unlabored respirations  CARDIOVASCULAR: +tachycardic, Regular rate and rhythm, normal S1 and S2, no murmurs, rubs, or gallops  ABDOMEN: soft, nontender, nondistended, normal bowel sounds  MUSCULOSKELETAL: LLE swelling left, ACE wrapped. DP pulses palpable  PSYCH: Tearful at times  NEUROLOGY: AAOx3, CNs grossly intact  SKIN: No rashes; no palpable lesions    LABS:              9.6    11.47 )-----------( 460      ( 13 Jan 2021 07:14 )             32.2     01-13    135  |  96<L>  |  12  ----------------------------<  83  4.2   |  25  |  0.91    Ca    9.7      13 Jan 2021 07:14  Phos  3.8     01-13  Mg     2.5     01-13    TPro  8.5<H>  /  Alb  3.4  /  TBili  0.5  /  DBili  x   /  AST  36<H>  /  ALT  40<H>  /  AlkPhos  124<H>  01-13    PT/INR - ( 13 Jan 2021 06:49 )   PT: 16.0 sec;   INR: 1.41 ratio      PTT - ( 13 Jan 2021 06:49 )  PTT: 32.8 sec    RADIOLOGY & ADDITIONAL TESTS:  Results Reviewed:   Imaging Personally Reviewed:   Electrocardiogram Personally Reviewed:     COORDINATION OF CARE:  Care Discussed with Consultants/Other Providers [Y/N]:   Prior or Outpatient Records Reviewed [Y/N]:

## 2021-01-14 NOTE — PROGRESS NOTE ADULT - ASSESSMENT
25 y.o. 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 Followed by heme/onc, vascular, rheumatology, currently undergoing hypercoagulability work up. Vascular surgery planned for thrombectomy, however later decided against procedure. 25 y.o. 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 Followed by heme/onc, vascular, rheumatology, hypercoagulability work up pending. Vascular surgery planned for thrombectomy, however later decided against procedure.

## 2021-01-14 NOTE — PHYSICAL THERAPY INITIAL EVALUATION ADULT - WEIGHT-BEARING RESTRICTIONS: GAIT, REHAB EVAL
LE's , however pt was maintaining TTWB on LLE due to anticipating pain with weight bearing/weight-bearing as tolerated

## 2021-01-14 NOTE — PROGRESS NOTE ADULT - PROBLEM SELECTOR PROBLEM 2
Acute deep vein thrombosis (DVT) of iliac vein of both lower extremities

## 2021-01-14 NOTE — CHART NOTE - NSCHARTNOTESELECT_GEN_ALL_CORE
Cardiology Fellow/Event Note
Hematology/Off Service Note
Heme/Event Note
Vascular Surgery
Event Note
Event Note
Pre-op/Event Note
Preop/Event Note
Rheumatology/Event Note
Vascular Surgery/Off Service Note

## 2021-01-14 NOTE — PROGRESS NOTE ADULT - PROBLEM SELECTOR PLAN 2
likely provoked by recent surgery and OCP use  - anticoagulated on heparin with therapeutic aptt  - repeat LLE US showed new DVT on hep gtt. Per discussion with heme, wouldn't call it AC failure given patient has been on AC since 1/2. However will leave formal recs  - vascular planned thrombectomy today, later decided against procedure. will continue fondaparinux  - Pain management as below  - F/u coagulapathy workup likely provoked by recent surgery and OCP use  - vascular had tentatively planned for thrombectomy, no further plans inpatient, cleared for D/C  - Pain controlled by Tylenol and PRN oxycodone  - F/u coagulapathy workup

## 2021-01-14 NOTE — PROGRESS NOTE ADULT - PROBLEM SELECTOR PLAN 5
- pending PT  - Will need follow-up with an anticoagulation and thrombosis clinic - pending PT evaluation  - d/c on fondaparinux  - Follow up with heme/onc, rheum outpatient

## 2021-01-14 NOTE — PROGRESS NOTE ADULT - PROBLEM SELECTOR PLAN 3
Denies dysuria and other renal complaints. Cr 0.93 on admission.  - Pain management as below  - UA on admission w/ bacteria, blood. However, also with many epithelial cells, likely contaminant. No need for antibiotics presently. Denies dysuria and other renal complaints. Cr 0.93 on admission.  - Pain management as below

## 2021-01-14 NOTE — CHART NOTE - NSCHARTNOTEFT_GEN_A_CORE
24y/o F hx morbid obesity p/w new PE, common iliac DVT and renal infarct i/s/o PFO and OCP use. Now on AC, pending hematologic workup.     - No plan for thrombectomy per discussion with patient and hematology team. Currently risks of the procedure outweigh the benefits.   - Please keep it ACE wrapped and elevated  - f/u heme recs    - Rec compression stockings to be continued after discharge   - Pt will need to follow up with Dr. Pham outpatient     Valentina Pham    Vascular Surgery     [phone]  - Please age with additional questions or concerns     Marian Troncoso PGY2  Vascular Surgery   [id number]

## 2021-01-14 NOTE — DISCHARGE NOTE NURSING/CASE MANAGEMENT/SOCIAL WORK - NSDCPEARIXTRAREACT_GEN_ALL_CORE
Fondaparinux/Arixtra increases your risk for bleeding. Notify your doctor if you experience any of the following side effects: unusual bleeding or bruising, unexplained nosebleeds, blood in your urine or stool, itching or hives, chest tightness, trouble breathing, swelling in your face or hands, swelling in your mouth or throat, numbness or tingling in your legs, back pain, loss of bowel or bladder control, or tiny red dots on skin or legs. When Fondaparinux/Arixtra is taken with other medicines, they can affect how it works. Taking other medications such as aspirin, blood thinners, and nonsteroidal anti-inflammatories increases your risk of bleeding. It is very important to tell your health care provider about all other medicines, including over-the-counter medications, herbs, and vitamins you are taking. DO NOT start, stop, or change the dosage of any medicine, including over-the-counter medicines, vitamins, and herbal products without your doctor’s approval. Any products containing aspirin or are nonsteroidal anti-inflammatories lessen the blood’s ability to form clots and adds to the effect of Fondaparinux/Arixtra. Never take aspirin or medicines that contain aspirin without speaking to your doctor.

## 2021-01-14 NOTE — PROGRESS NOTE ADULT - PROBLEM SELECTOR PLAN 4
-standing tylenol 975 q8h  -oxy IR 10 q4h PRN  -oxy IR 5 q4h PRN -standing Tylenol 975 q8h  -oxy IR 10 q4h PRN  -oxy IR 5 q4h PRN

## 2021-01-16 LAB
PS IGA SER-ACNC: 5 — SIGNIFICANT CHANGE UP (ref 0–20)
PS IGG SER-ACNC: 4 — SIGNIFICANT CHANGE UP (ref 0–11)
PS IGM SER-ACNC: 12 — SIGNIFICANT CHANGE UP (ref 0–25)

## 2021-01-18 ENCOUNTER — APPOINTMENT (OUTPATIENT)
Dept: INTERNAL MEDICINE | Facility: CLINIC | Age: 26
End: 2021-01-18
Payer: COMMERCIAL

## 2021-01-18 ENCOUNTER — NON-APPOINTMENT (OUTPATIENT)
Age: 26
End: 2021-01-18

## 2021-01-18 VITALS
HEIGHT: 65 IN | SYSTOLIC BLOOD PRESSURE: 119 MMHG | BODY MASS INDEX: 34.99 KG/M2 | DIASTOLIC BLOOD PRESSURE: 79 MMHG | WEIGHT: 210 LBS | HEART RATE: 112 BPM | OXYGEN SATURATION: 96 % | TEMPERATURE: 99.4 F

## 2021-01-18 DIAGNOSIS — Z82.49 FAMILY HISTORY OF ISCHEMIC HEART DISEASE AND OTHER DISEASES OF THE CIRCULATORY SYSTEM: ICD-10-CM

## 2021-01-18 DIAGNOSIS — J35.01 CHRONIC TONSILLITIS: ICD-10-CM

## 2021-01-18 DIAGNOSIS — Z83.3 FAMILY HISTORY OF DIABETES MELLITUS: ICD-10-CM

## 2021-01-18 DIAGNOSIS — Z09 ENCOUNTER FOR FOLLOW-UP EXAMINATION AFTER COMPLETED TREATMENT FOR CONDITIONS OTHER THAN MALIGNANT NEOPLASM: ICD-10-CM

## 2021-01-18 DIAGNOSIS — K59.00 CONSTIPATION, UNSPECIFIED: ICD-10-CM

## 2021-01-18 DIAGNOSIS — Z82.61 FAMILY HISTORY OF ARTHRITIS: ICD-10-CM

## 2021-01-18 DIAGNOSIS — E66.9 OBESITY, UNSPECIFIED: ICD-10-CM

## 2021-01-18 DIAGNOSIS — Z78.9 OTHER SPECIFIED HEALTH STATUS: ICD-10-CM

## 2021-01-18 PROCEDURE — 99496 TRANSJ CARE MGMT HIGH F2F 7D: CPT | Mod: 25

## 2021-01-18 PROCEDURE — 99214 OFFICE O/P EST MOD 30 MIN: CPT

## 2021-01-18 PROCEDURE — 99072 ADDL SUPL MATRL&STAF TM PHE: CPT

## 2021-01-18 PROCEDURE — 99204 OFFICE O/P NEW MOD 45 MIN: CPT | Mod: 25

## 2021-01-18 RX ORDER — LEVONORGESTREL AND ETHINYL ESTRADIOL 0.1-0.02MG
0.1-2 KIT ORAL
Qty: 56 | Refills: 0 | Status: COMPLETED | COMMUNITY
Start: 2020-09-24 | End: 2021-01-18

## 2021-01-18 RX ORDER — LEVONORGESTREL 1.5 MG/1
1.5 TABLET ORAL
Qty: 8 | Refills: 0 | Status: COMPLETED | COMMUNITY
Start: 2020-09-24 | End: 2021-01-18

## 2021-01-18 RX ORDER — CONDOMS, FEMALE
EACH MISCELLANEOUS
Qty: 24 | Refills: 0 | Status: COMPLETED | COMMUNITY
Start: 2020-09-24 | End: 2021-01-18

## 2021-01-18 RX ORDER — OXYCODONE AND ACETAMINOPHEN 5; 325 MG/1; MG/1
5-325 TABLET ORAL
Qty: 30 | Refills: 0 | Status: COMPLETED | COMMUNITY
Start: 2020-12-21 | End: 2021-01-18

## 2021-01-18 RX ORDER — PREDNISONE 20 MG/1
20 TABLET ORAL
Qty: 10 | Refills: 0 | Status: COMPLETED | COMMUNITY
Start: 2020-11-11 | End: 2021-01-18

## 2021-01-18 RX ORDER — OXYCODONE 10 MG/1
10 TABLET ORAL
Qty: 30 | Refills: 0 | Status: ACTIVE | COMMUNITY
Start: 2021-01-14

## 2021-01-18 NOTE — PHYSICAL EXAM
[No Acute Distress] : no acute distress [Well Nourished] : well nourished [Well Developed] : well developed [No JVD] : no jugular venous distention [Supple] : supple [No Respiratory Distress] : no respiratory distress  [Clear to Auscultation] : lungs were clear to auscultation bilaterally [Normal Rate] : normal rate  [Regular Rhythm] : with a regular rhythm [Normal S1, S2] : normal S1 and S2 [Soft] : abdomen soft [Non Tender] : non-tender [Normal Bowel Sounds] : normal bowel sounds [Normal Supraclavicular Nodes] : no supraclavicular lymphadenopathy [Normal Posterior Cervical Nodes] : no posterior cervical lymphadenopathy [Normal Anterior Cervical Nodes] : no anterior cervical lymphadenopathy [No CVA Tenderness] : no CVA  tenderness [No Spinal Tenderness] : no spinal tenderness [No Joint Swelling] : no joint swelling [Speech Grossly Normal] : speech grossly normal [Normal Affect] : the affect was normal [Alert and Oriented x3] : oriented to person, place, and time [Normal Mood] : the mood was normal [de-identified] : obese female presented to office in a wheel chair, [de-identified] : R thigh with tense edema and was tender, but no erythema, [de-identified] : Pt did not stand for more than 1-2 minutes due to L thigh pain, [95218 - High Complexity requires an extensive number of possible diagnoses and/or the management options, extensive complexity of the medical data (tests, etc.) to be reviewed, and a high risk of significant complications, morbidity, and/or mortality as w] : High Complexity

## 2021-01-18 NOTE — HISTORY OF PRESENT ILLNESS
[Post-hospitalization from ___ Hospital] : Post-hospitalization from [unfilled] Hospital [Admitted on: ___] : The patient was admitted on [unfilled] [Discharged on ___] : discharged on [unfilled] [Discharge Summary] : discharge summary [Discharge Med List] : discharge medication list [Med Reconciliation] : medication reconciliation has been completed [Radiology Findings] : radiology findings [FreeTextEntry2] : Patient presented for the first time for transition of care, she's looking for a new PCP.  She was accompanied by her mother.\par \par She had tonsillectomy on 12/15/2021.   She was very sedentary for 2 weeks and was on OCP.  She had LBP that eventually resolved.  She developed CP & SOB on 1/1/2021.  She went to ED and was diagnosed with PE/DVT and renal infarct.  She had PFO, and they thought it was the reason for renal infarct.  She develop L leg pain and swelling about 5-6 days after hospitalization and found a new DVT, so AC was changed and pt is now on Arixtra injection.\par \par She has been home since 1/14/2021, she has a lot of pain on her L leg and was taking Oxycodone, but switching to Tylenol now.  She had constipation when she was on Oxycodone, but constipation resolved now that she is taking Tylenol.\par \par Her chest pain is better now, and she's not SOB.  She was treated with Morphine while she was in the hospital, then switched to Percocet, then discharged on Oxycodone 10 mg PRN.

## 2021-01-19 LAB — MPL EXON 10 MUTATION: SIGNIFICANT CHANGE UP

## 2021-01-20 LAB — JAK2 P.V617F BLD/T QL: SIGNIFICANT CHANGE UP

## 2021-01-21 ENCOUNTER — RESULT REVIEW (OUTPATIENT)
Age: 26
End: 2021-01-21

## 2021-01-22 LAB
ANTI-PHOSPHATIDYLETHANOLAMINE IGA: 11.5 U/ML — SIGNIFICANT CHANGE UP
ANTI-PHOSPHATIDYLETHANOLAMINE IGG: 1.3 U/ML — SIGNIFICANT CHANGE UP
ANTI-PHOSPHATIDYLETHANOLAMINE IGM: 6.3 U/ML — SIGNIFICANT CHANGE UP

## 2021-01-26 ENCOUNTER — APPOINTMENT (OUTPATIENT)
Dept: VASCULAR SURGERY | Facility: CLINIC | Age: 26
End: 2021-01-26
Payer: COMMERCIAL

## 2021-01-26 VITALS
WEIGHT: 205 LBS | SYSTOLIC BLOOD PRESSURE: 106 MMHG | HEIGHT: 65 IN | TEMPERATURE: 97.7 F | HEART RATE: 90 BPM | BODY MASS INDEX: 34.16 KG/M2 | DIASTOLIC BLOOD PRESSURE: 75 MMHG

## 2021-01-26 PROCEDURE — 99242 OFF/OP CONSLTJ NEW/EST SF 20: CPT

## 2021-01-26 PROCEDURE — 99072 ADDL SUPL MATRL&STAF TM PHE: CPT

## 2021-01-26 NOTE — REASON FOR VISIT
[Post Hospitalization] : a post hospitalization visit [Parent] : parent [FreeTextEntry1] : Iliac vein DVT

## 2021-01-26 NOTE — PHYSICAL EXAM
[JVD] : no jugular venous distention  [Normal Breath Sounds] : Normal breath sounds [Normal Heart Sounds] : normal heart sounds [1+] : right 1+ [FreeTextEntry1] : similar size legs.

## 2021-01-26 NOTE — HISTORY OF PRESENT ILLNESS
[FreeTextEntry1] : A 25 year old woman who was recently discharged from hospital after being diagnosed with PE, left iliac DVT and renal infarcts as well as PFO. We removed the possibility of endovascular intervention after issues with her anticoagulation and also the PFO. She continues to be tired but her leg pain is much improved since discharge.

## 2021-01-26 NOTE — ASSESSMENT
[FreeTextEntry1] : DVT improving on AC. She needs to follow up on length of AC and also her PFO and rheumatology work up. As far as am concerned, there is no need for any further intervention for the DVT.

## 2021-01-27 ENCOUNTER — RESULT REVIEW (OUTPATIENT)
Age: 26
End: 2021-01-27

## 2021-01-27 ENCOUNTER — APPOINTMENT (OUTPATIENT)
Dept: HEMATOLOGY ONCOLOGY | Facility: CLINIC | Age: 26
End: 2021-01-27
Payer: COMMERCIAL

## 2021-01-27 ENCOUNTER — OUTPATIENT (OUTPATIENT)
Dept: OUTPATIENT SERVICES | Facility: HOSPITAL | Age: 26
LOS: 1 days | Discharge: HOME | End: 2021-01-27

## 2021-01-27 ENCOUNTER — EMERGENCY (EMERGENCY)
Facility: HOSPITAL | Age: 26
LOS: 1 days | Discharge: HOME | End: 2021-01-27
Attending: EMERGENCY MEDICINE | Admitting: EMERGENCY MEDICINE
Payer: COMMERCIAL

## 2021-01-27 VITALS
DIASTOLIC BLOOD PRESSURE: 72 MMHG | OXYGEN SATURATION: 100 % | RESPIRATION RATE: 19 BRPM | HEART RATE: 92 BPM | SYSTOLIC BLOOD PRESSURE: 116 MMHG

## 2021-01-27 VITALS
SYSTOLIC BLOOD PRESSURE: 127 MMHG | OXYGEN SATURATION: 100 % | RESPIRATION RATE: 20 BRPM | TEMPERATURE: 99 F | HEIGHT: 67 IN | HEART RATE: 88 BPM | DIASTOLIC BLOOD PRESSURE: 83 MMHG

## 2021-01-27 VITALS
RESPIRATION RATE: 18 BRPM | HEIGHT: 65 IN | TEMPERATURE: 97.9 F | BODY MASS INDEX: 34.15 KG/M2 | WEIGHT: 204.99 LBS | SYSTOLIC BLOOD PRESSURE: 111 MMHG | OXYGEN SATURATION: 99 % | HEART RATE: 89 BPM | DIASTOLIC BLOOD PRESSURE: 73 MMHG

## 2021-01-27 DIAGNOSIS — N28.0 ISCHEMIA AND INFARCTION OF KIDNEY: ICD-10-CM

## 2021-01-27 DIAGNOSIS — Z90.89 ACQUIRED ABSENCE OF OTHER ORGANS: Chronic | ICD-10-CM

## 2021-01-27 DIAGNOSIS — I82.409 ACUTE EMBOLISM AND THROMBOSIS OF UNSPECIFIED DEEP VEINS OF UNSPECIFIED LOWER EXTREMITY: ICD-10-CM

## 2021-01-27 DIAGNOSIS — Q21.1 ATRIAL SEPTAL DEFECT: ICD-10-CM

## 2021-01-27 LAB
ALBUMIN SERPL ELPH-MCNC: 4 G/DL — SIGNIFICANT CHANGE UP (ref 3.3–5)
ALP SERPL-CCNC: 64 U/L — SIGNIFICANT CHANGE UP (ref 40–120)
ALT FLD-CCNC: 8 U/L — SIGNIFICANT CHANGE UP (ref 4–33)
ANION GAP SERPL CALC-SCNC: 13 MMOL/L — SIGNIFICANT CHANGE UP (ref 7–14)
APTT BLD: 34.9 SEC — SIGNIFICANT CHANGE UP (ref 27–36.3)
AST SERPL-CCNC: 5 U/L — SIGNIFICANT CHANGE UP (ref 4–32)
BASOPHILS # BLD AUTO: 0.05 K/UL — SIGNIFICANT CHANGE UP (ref 0–0.2)
BASOPHILS # BLD AUTO: 0.06 K/UL — SIGNIFICANT CHANGE UP (ref 0–0.2)
BASOPHILS NFR BLD AUTO: 0.9 % — SIGNIFICANT CHANGE UP (ref 0–2)
BASOPHILS NFR BLD AUTO: 0.9 % — SIGNIFICANT CHANGE UP (ref 0–2)
BILIRUB SERPL-MCNC: 0.3 MG/DL — SIGNIFICANT CHANGE UP (ref 0.2–1.2)
BUN SERPL-MCNC: 12 MG/DL — SIGNIFICANT CHANGE UP (ref 7–23)
CALCIUM SERPL-MCNC: 10.1 MG/DL — SIGNIFICANT CHANGE UP (ref 8.4–10.5)
CHLORIDE SERPL-SCNC: 102 MMOL/L — SIGNIFICANT CHANGE UP (ref 98–107)
CO2 SERPL-SCNC: 22 MMOL/L — SIGNIFICANT CHANGE UP (ref 22–31)
CREAT SERPL-MCNC: 0.76 MG/DL — SIGNIFICANT CHANGE UP (ref 0.5–1.3)
EOSINOPHIL # BLD AUTO: 0.35 K/UL — SIGNIFICANT CHANGE UP (ref 0–0.5)
EOSINOPHIL # BLD AUTO: 0.35 K/UL — SIGNIFICANT CHANGE UP (ref 0–0.5)
EOSINOPHIL NFR BLD AUTO: 5.3 % — SIGNIFICANT CHANGE UP (ref 0–6)
EOSINOPHIL NFR BLD AUTO: 6.3 % — HIGH (ref 0–6)
GLUCOSE SERPL-MCNC: 87 MG/DL — SIGNIFICANT CHANGE UP (ref 70–99)
HCT VFR BLD CALC: 32.5 % — LOW (ref 34.5–45)
HCT VFR BLD CALC: 32.5 % — LOW (ref 34.5–45)
HGB BLD-MCNC: 9.2 G/DL — LOW (ref 11.5–15.5)
HGB BLD-MCNC: 9.4 G/DL — LOW (ref 11.5–15.5)
IANC: 3.71 K/UL — SIGNIFICANT CHANGE UP (ref 1.5–8.5)
IMM GRANULOCYTES NFR BLD AUTO: 0.4 % — SIGNIFICANT CHANGE UP (ref 0–1.5)
INR BLD: 1.32 RATIO — HIGH (ref 0.88–1.16)
LYMPHOCYTES # BLD AUTO: 1.59 K/UL — SIGNIFICANT CHANGE UP (ref 1–3.3)
LYMPHOCYTES # BLD AUTO: 1.88 K/UL — SIGNIFICANT CHANGE UP (ref 1–3.3)
LYMPHOCYTES # BLD AUTO: 28.3 % — SIGNIFICANT CHANGE UP (ref 13–44)
LYMPHOCYTES # BLD AUTO: 28.8 % — SIGNIFICANT CHANGE UP (ref 13–44)
MCHC RBC-ENTMCNC: 19.9 PG — LOW (ref 27–34)
MCHC RBC-ENTMCNC: 20.5 PG — LOW (ref 27–34)
MCHC RBC-ENTMCNC: 28.3 GM/DL — LOW (ref 32–36)
MCHC RBC-ENTMCNC: 28.9 G/DL — LOW (ref 32–36)
MCV RBC AUTO: 70.3 FL — LOW (ref 80–100)
MCV RBC AUTO: 71 FL — LOW (ref 80–100)
MONOCYTES # BLD AUTO: 0.42 K/UL — SIGNIFICANT CHANGE UP (ref 0–0.9)
MONOCYTES # BLD AUTO: 0.47 K/UL — SIGNIFICANT CHANGE UP (ref 0–0.9)
MONOCYTES NFR BLD AUTO: 7.1 % — SIGNIFICANT CHANGE UP (ref 2–14)
MONOCYTES NFR BLD AUTO: 7.6 % — SIGNIFICANT CHANGE UP (ref 2–14)
NEUTROPHILS # BLD AUTO: 3.09 K/UL — SIGNIFICANT CHANGE UP (ref 1.8–7.4)
NEUTROPHILS # BLD AUTO: 3.71 K/UL — SIGNIFICANT CHANGE UP (ref 1.8–7.4)
NEUTROPHILS NFR BLD AUTO: 55.7 % — SIGNIFICANT CHANGE UP (ref 43–77)
NEUTROPHILS NFR BLD AUTO: 56 % — SIGNIFICANT CHANGE UP (ref 43–77)
NRBC # BLD: 0 /100 WBCS — SIGNIFICANT CHANGE UP (ref 0–0)
NT-PROBNP SERPL-SCNC: 7 PG/ML — SIGNIFICANT CHANGE UP
PLATELET # BLD AUTO: 486 K/UL — HIGH (ref 150–400)
PLATELET # BLD AUTO: 550 K/UL — HIGH (ref 150–400)
POTASSIUM SERPL-MCNC: 4 MMOL/L — SIGNIFICANT CHANGE UP (ref 3.5–5.3)
POTASSIUM SERPL-SCNC: 4 MMOL/L — SIGNIFICANT CHANGE UP (ref 3.5–5.3)
PROT SERPL-MCNC: 8.9 G/DL — HIGH (ref 6–8.3)
PROTHROM AB SERPL-ACNC: 15 SEC — HIGH (ref 10.6–13.6)
RBC # BLD: 4.58 M/UL — SIGNIFICANT CHANGE UP (ref 3.8–5.2)
RBC # BLD: 4.62 M/UL — SIGNIFICANT CHANGE UP (ref 3.8–5.2)
RBC # FLD: 18.7 % — HIGH (ref 10.3–14.5)
RBC # FLD: 18.9 % — HIGH (ref 10.3–14.5)
SARS-COV-2 RNA SPEC QL NAA+PROBE: SIGNIFICANT CHANGE UP
SODIUM SERPL-SCNC: 137 MMOL/L — SIGNIFICANT CHANGE UP (ref 135–145)
TROPONIN T, HIGH SENSITIVITY RESULT: 8 NG/L — SIGNIFICANT CHANGE UP
WBC # BLD: 5.52 K/UL — SIGNIFICANT CHANGE UP (ref 3.8–10.5)
WBC # BLD: 6.66 K/UL — SIGNIFICANT CHANGE UP (ref 3.8–10.5)
WBC # FLD AUTO: 5.52 K/UL — SIGNIFICANT CHANGE UP (ref 3.8–10.5)
WBC # FLD AUTO: 6.66 K/UL — SIGNIFICANT CHANGE UP (ref 3.8–10.5)

## 2021-01-27 PROCEDURE — 93010 ELECTROCARDIOGRAM REPORT: CPT

## 2021-01-27 PROCEDURE — 99214 OFFICE O/P EST MOD 30 MIN: CPT | Mod: 25

## 2021-01-27 PROCEDURE — 99072 ADDL SUPL MATRL&STAF TM PHE: CPT

## 2021-01-27 PROCEDURE — 99285 EMERGENCY DEPT VISIT HI MDM: CPT | Mod: 25

## 2021-01-27 PROCEDURE — 71275 CT ANGIOGRAPHY CHEST: CPT | Mod: 26

## 2021-01-27 PROCEDURE — 71045 X-RAY EXAM CHEST 1 VIEW: CPT | Mod: 26

## 2021-01-27 RX ORDER — MORPHINE SULFATE 50 MG/1
4 CAPSULE, EXTENDED RELEASE ORAL ONCE
Refills: 0 | Status: DISCONTINUED | OUTPATIENT
Start: 2021-01-27 | End: 2021-01-27

## 2021-01-27 RX ORDER — ACETAMINOPHEN 500 MG
975 TABLET ORAL ONCE
Refills: 0 | Status: COMPLETED | OUTPATIENT
Start: 2021-01-27 | End: 2021-01-27

## 2021-01-27 RX ORDER — CHLORHEXIDINE GLUCONATE 4 %
325 (65 FE) LIQUID (ML) TOPICAL DAILY
Refills: 0 | Status: ACTIVE | COMMUNITY

## 2021-01-27 RX ORDER — OXYCODONE HYDROCHLORIDE 5 MG/1
1 TABLET ORAL
Qty: 12 | Refills: 0
Start: 2021-01-27 | End: 2021-01-29

## 2021-01-27 RX ORDER — FONDAPARINUX SODIUM 7.5 MG/.6ML
7.5 INJECTION, SOLUTION SUBCUTANEOUS
Qty: 18 | Refills: 0 | Status: DISCONTINUED | COMMUNITY
Start: 2021-01-14 | End: 2021-01-27

## 2021-01-27 RX ADMIN — Medication 975 MILLIGRAM(S): at 05:17

## 2021-01-27 RX ADMIN — MORPHINE SULFATE 4 MILLIGRAM(S): 50 CAPSULE, EXTENDED RELEASE ORAL at 05:17

## 2021-01-27 NOTE — ED ADULT TRIAGE NOTE - CHIEF COMPLAINT QUOTE
Pt arrives from HOme. As per EMT" She report sharp left sided chest pain after rolling over, pain resolved with oxygen.  Recently discharged from Hospital on 1/15 Dx with PE, DVT. " Pt on blood thinner name unk.. Presenlty denies pain, denies shortness of breath.

## 2021-01-27 NOTE — ED ADULT NURSE NOTE - OBJECTIVE STATEMENT
Received PT to room Tr-B, AAO4, ambulatory at baseline, c/o nonradiating sharp mid sternal, pleuritic chest pain worsening past 2-3 days. PT was recently diagnosed PE on 1/6/21, taking fonvanarinaux 7.5mg. Presents to ED, CM- NSR with 1st degree HB, breathing even and unlabored. Denies SOB, abdominal pain, N/V/D, dysuria. Left 20g AC placed, labs drawn, bed in lowest position, will continue to monitor. Received PT to room Tr-B, AAO4, ambulatory at baseline, c/o nonradiating sharp mid sternal, pleuritic chest pain worsening past 2-3 days. PT was recently diagnosed PE on 1/6/21, taking fonvanarinaux 7.5mg. Presents to ED, CM- NSR with 1st degree HB, breathing even and unlabored, left leg weakness. Denies SOB, abdominal pain, N/V/D, dysuria. Left 20g AC placed, labs drawn, bed in lowest position, will continue to monitor.

## 2021-01-27 NOTE — ED ADULT TRIAGE NOTE - NS ED TRIAGE CLINICAL UPGRADE PROVIDER FT
MARIE Banda pt c/o intermittent return of sharp pain with diff breathing lasts few seconds then goes

## 2021-01-27 NOTE — ED ADULT NURSE REASSESSMENT NOTE - NS ED NURSE REASSESS COMMENT FT1
PT in bed, expresses partial relief. Denies chest pain, SOB, headache, dizziness. Able to make needs known, will continue to monitor.

## 2021-01-27 NOTE — ED PROVIDER NOTE - PATIENT PORTAL LINK FT
You can access the FollowMyHealth Patient Portal offered by [facility] by registering at the following website: [email or web address]. By joining [facility]’s FollowMyHealth portal, you will also be able to view your health information using other applications (apps) compatible with our system.

## 2021-01-27 NOTE — ED PROVIDER NOTE - PHYSICAL EXAMINATION
PGY3/MD Sergio.   VITALS: reviewed  GEN: No apparent distress, A & O x 4  HEAD/EYES: NC/AT, PERRL, EOMI, anicteric sclerae, no conjunctival pallor  ENT: mucus membranes moist, oropharynx WNL, trachea midline, no JVD, neck is supple  RESP: lungs CTA with equal breath sounds bilaterally, chest wall nontender and atraumatic  CV: heart with reg rhythm S1, S2, no murmur; distal pulses intact and symmetric bilaterally  ABDOMEN: normoactive bowel sounds, soft, nondistended, nontender  MSK: extremities atraumatic and nontender, no edema, no asymmetry.   SKIN: warm, dry, no rash, no bruising, no cyanosis. color appropriate for ethnicity  NEURO: alert, mentating appropriately, no facial asymmetry.  PSYCH: Affect appropriate

## 2021-01-27 NOTE — ED PROVIDER NOTE - ATTENDING CONTRIBUTION TO CARE
26 y/o F with recent hospitalization for DVT/PE, renal infarct 2/2 PFO on arixtra, currently undergoing outpatient hypercoagulation work-up here with chest pain.  Pt reports she had been in her usual state of health upon discharge.  Tonight she experienced left sided chest pain with mild SOB that has since improved.  She is compliant with her anticoagulation.  Had f/u with vascular last week without any new intervention and is pending heme follow-up later today.  No fever, chills, cough, palpitations, abd pain, n/v, leg pain or swelling.  No tob or hormonal use.  Well appearing, overweight, sitting comfortably in stretcher, awake and alert, nontoxic.  AF/VSS.  Lungs cta bl.  Cards nl S1/S2, RRR, no MRG.  Abd soft ntnd.  No pedal edema or calf tenderness.  Exam is unremarkable and pt is well appearing, low suspicion for acs will screen with ekg, no s/s infection, sxs not consistent with aortic catastrophe. Given recent dx of extensive pe, will eval for failure of outpt therapy - cta to eval for worsening vs improvement in clot burden, labs, xr, reassess.

## 2021-01-27 NOTE — ED PROVIDER NOTE - CARE PROVIDER_API CALL
Sahil Covarrubias; MBBS)  Hematology; [facility]; Medical Oncology  [address] [place] [phone]  Phone: [phone]  Fax: [phone]  Follow Up Time: 1-3 Days

## 2021-01-27 NOTE — ED PROVIDER NOTE - OBJECTIVE STATEMENT
PGY3/MD Sergio. 24 yo F, with recently diagnosed PE, PFO on cardiac echo, 1/6/21, on fonvanarinaux 7.5mg sc, p/w on-off, chest pain, sharp, pleuritic, worsening over the last 2-3d. Pt saw Dr. Pham (vascula) today, but not seen hematologist (Dr. Covarrubias) yet. no sob, or fever, chills. No abd or leg pain, pt feels less swollen on the left leg. No loger taking oxycodone, taking tylenol for pain.

## 2021-01-27 NOTE — ED PROVIDER NOTE - CLINICAL SUMMARY MEDICAL DECISION MAKING FREE TEXT BOX
PGY3/MD Sergio. 26 yo F, unknown coagulation etiology, recent PE, p/w worsening chest pain, not hypoxic, hemodyanmically stable but concerning for progression of PE under out pt AC therapy, will do CTA to re-evaluate PE. pain meds, lab.

## 2021-01-27 NOTE — ED PROVIDER NOTE - NSFOLLOWUPINSTRUCTIONS_ED_ALL_ED_FT
- please see Dr. Covarrubias as scheduled, discussed the medication plan  - take tylenol 650-1000 mg every 6hrs, if needed, you can additionally take 5mg oxycodone    Please take oxycodone every 6 hours, as needed for SEVERE pain. Don't exceed 4 tabs per day. Please do not drive or operate heavy machinary while on this medication. May cause drowsiness.  Alcohol may intensify this effect.  Caution federal law prohibits the transfer of this drug to any person other  than the person for whom it was prescribed.  This prescription cannot be refilled.  This product contains acetaminophen.  Do not use  with any other product containing acetaminophen to prevent possible liver damage.    (1) Follow up with your primary care physician as discussed. In addition, we did not find evidence of a life threatening illness on your testing here today, but listed below are the specialists that will be necessary to see as an outpatient to continue the workup.  Please call the numbers listed below or [phone] to set up the necessary appointments  or call [phone] to make an appointment with the clinic.  (2) You were seen for chest pain. A copy of your resulted labs, imaging, and findings have been provided to you.  (3) improving PE on CT angiogram, please continue medication   (4) Return immediately to the emergency department for new, persistent, or worsening symptoms or signs. Return immediately to the emergency department if you have chest pain, shortness of breath, loss of consciousness, or any other new concerns.

## 2021-01-27 NOTE — ED ADULT NURSE NOTE - NSIMPLEMENTINTERV_GEN_ALL_ED
Implemented All Universal Safety Interventions:  [place] to call system. Call bell, personal items and telephone within reach. Instruct patient to call for assistance. Room bathroom lighting operational. Non-slip footwear when patient is off stretcher. Physically safe environment: no spills, clutter or unnecessary equipment. Stretcher in lowest position, wheels locked, appropriate side rails in place.

## 2021-02-02 PROBLEM — N28.0 RENAL INFARCT: Status: ACTIVE | Noted: 2021-01-18

## 2021-02-02 PROBLEM — Q21.1 PFO (PATENT FORAMEN OVALE): Status: ACTIVE | Noted: 2021-01-18

## 2021-02-03 ENCOUNTER — APPOINTMENT (OUTPATIENT)
Dept: HEMATOLOGY ONCOLOGY | Facility: CLINIC | Age: 26
End: 2021-02-03

## 2021-02-04 LAB
PROT S AG ACT/NOR PPP IA: 11 %
PROT S FREE PPP-ACNC: 27 %

## 2021-02-04 NOTE — PHYSICAL EXAM
[Ambulatory and capable of all self care but unable to carry out any work activities] : Status 2- Ambulatory and capable of all self care but unable to carry out any work activities. Up and about more than 50% of waking hours [Normal] : affect appropriate [de-identified] : LLE edema from ankle to upper thigh  [de-identified] : No cervical, axillary or inguinal LAD noted

## 2021-02-04 NOTE — ASSESSMENT
[FreeTextEntry1] : 25 year old woman with recent history of bilateral PE, LLE DVT in setting of recent tonsillectomy, oral contraceptives and automotive travel.  Patient is on Fondaparinux 7.5mg SC daily.  \par Hypercoagulable work-up in the hospital included Anti-thrombin III level (negative),  Anti-cardiolipin antibody (negative),  DRVVT (negative), and HIT antibody (negative).  Factor V Leiden, Prothrombin gene mutation and PI Linked Antigen were drawn in the hospital but cancelled.  \par \par Will send CBC, Protein S Antigen, Factor V Leiden and Prothrombin gene mutation.  \par Recommend anticoagulation for 6 months.  \par Will change Arixtra to Eliquis BID.  Start Eliquis 5mg 2 pills po BID X 1 week, then 1 pill po BID X 6 months. \par Recommend Ferrous sulfate 325mg po daily for iron deficiency. \par Avoid oral contraceptives\par Follow-up with gyn regarding heavy menstrual bleeding. \par Follow-up with cardiology regarding patent foramen ovale. \par RTO in 3 months\par Plan and management discussed with Dr. Messi Kilpatrick.  \par \par \par Attending attestation\par This is a 25 year old woman with a history of bilateral DVT, LE PE.  Completed hypercoagulable state workup done at hospital was negative, however FV leiden and PGM and PI linked antigen were cancelled without running.  Will have protein S, FVL and PGM re run.  \par Continue ferrous sulfate for now.  Continue anticoagulation but can change to Eliquis for ease of use.  \par \par Addendum 2/4/21\par Spoke to patient' and her mother re: results.  FVL normal, PGM normal.  Protein S activity down to 23% antigen 11%  This is low enough to cause a hypercoagulable state. This is 1 month following initial thrombosis, so is likely to be significant. Will repeat in April follow up to be sure. Patient confirmed that she is now on the Eiliquis at the loading dose, reminded them dose decreases to 5mg BID next Monday in that case.

## 2021-02-04 NOTE — REVIEW OF SYSTEMS
[Shortness Of Breath] : shortness of breath [Negative] : Allergic/Immunologic [Fatigue] : fatigue [Chest Pain] : chest pain [Lower Ext Edema] : lower extremity edema [FreeTextEntry4] : Denies dysphagia.  Healed after tonsillectomy.  [FreeTextEntry7] : moving bowels ok [de-identified] : occasional headaches

## 2021-02-04 NOTE — HISTORY OF PRESENT ILLNESS
[de-identified] : 1/27/2021 Initial Consultation\par CC: Referred after hospital discharge from [facility] for hypercoagulable work-up.  \par Accompanied by mother\par \par HPI:\par 25 year old woman with past medical history of recent tonsillectomy, bilateral pulmonary embolism and LLE DVT here for evaluation of hypercoagulability.  Patient was admitted at [facility] from 1/1/2021-1/14/2021 for c/o chest pain.  She was found to have bilateral lobar pulmonary embolism, LLE DVT from common iliac vein extending into internal iliac vein and left lower pole renal infarct.  She was initially placed on Heparin drip and then changed to Fondaparinox (Arixtra).  She underwent echocardiogram that showed patent foramen ovale.  She had a tonsillectomy on 12/15/2020 and spent most of her time recovering in bed.  She was on oral contraceptives for approx three months for dysmenorrhea.  She also travelled to [place] by car on 12/24/2020 and returned on 12/26/2020 and did not make any rest stops to stretch. This AM, she had worsening chest pain.  She went back to [facility] ER via ambulance.  Repeat CTA showed decreased clot burden with residual emboli noted within the right upper and left lower segmental artery branches and near complete resolution of LLL pulmonary infarct.  Also seen non-specific nodular and patchy ground glass opacities.  She was discharged home on Arixtra.  She c/o chest pain when laying down and occasional SOB.  She is tolerating Arixtra well.  Her mother is injecting her daily.  She reports that LLE swelling has improved.  She still has some soreness but is able to walk with walker.  She has home PT once per week.  She c/o some fatigue.  She denies fevers, pleuritic chest pain, abd pain, n/v/d, black/bloody stools, dizziness, night sweats, swollen glands, hematuria, epistaxis, bleeding gums, unexplained bruising or rashes.  She denies recent COVID infection or exposure.  \par \par PMHx/PSHx:\par Bilateral lobar PE, LLE DVT, left renal infarct 1/2021\par Patent foramen ovale seen on echocardiogram 1/2021\par Tonsillectomy 12/15/2020 for chronic tonsillitis.  Pathology negative.  Dr. Brandon Manuel at [facility]\par Menorrhagia and irregular menses was on oral contraceptives X 3 months\par Denies history or pregnancies or miscarriages.  \par \par Hospitalizations:\par [facility] 1/1/2021- 1/14/2021 for DVT/PE as described in HPI\par \par Medications:\par Oxycodone \par Fondaparinux 7.5mg SC daily\par \par Allergies: \par NKDA\par \par Social History:\par Lives with mother, father, sister and brother.  Works as an .  Graduated [employer] last year. Single.  Denies smoking.  Drinks socially. Born in [place]par \par Family History:\par Father alive- had DVT- risk factor was plane ride\par Mother alive- has RA and anemia- is on Humira and MTX\par Denies further family history of DVT, PE, CVA, miscarriages.  \par \par Healthcare Maintenance:\par Dr. Kelly Kilpatrick- new primary care doctor\par Dr. Pham- vascular surgery- saw recently and recommended compression stockings.  \par  [de-identified] : Initial visit

## 2021-02-18 ENCOUNTER — APPOINTMENT (OUTPATIENT)
Dept: INTERNAL MEDICINE | Facility: CLINIC | Age: 26
End: 2021-02-18
Payer: COMMERCIAL

## 2021-02-18 DIAGNOSIS — Z02.9 ENCOUNTER FOR ADMINISTRATIVE EXAMINATIONS, UNSPECIFIED: ICD-10-CM

## 2021-02-18 DIAGNOSIS — I26.99 OTHER PULMONARY EMBOLISM W/OUT ACUTE COR PULMONALE: ICD-10-CM

## 2021-02-18 PROCEDURE — 99442: CPT

## 2021-02-18 RX ORDER — LORATADINE 5 MG
17 TABLET,CHEWABLE ORAL
Refills: 0 | Status: COMPLETED | COMMUNITY
Start: 2021-01-18 | End: 2021-02-18

## 2021-02-18 RX ORDER — FONDAPARINUX SODIUM 7.5 MG/.6ML
7.5 INJECTION, SOLUTION SUBCUTANEOUS DAILY
Qty: 9 | Refills: 1 | Status: COMPLETED | COMMUNITY
Start: 2021-01-18 | End: 2021-02-18

## 2021-02-18 RX ORDER — APIXABAN 5 MG/1
5 TABLET, FILM COATED ORAL
Qty: 74 | Refills: 2 | Status: COMPLETED | COMMUNITY
Start: 2021-01-27 | End: 2021-02-18

## 2021-02-18 NOTE — PHYSICAL EXAM
[No Acute Distress] : no acute distress [Speech Grossly Normal] : speech grossly normal [Alert and Oriented x3] : oriented to person, place, and time [de-identified] : Unable to obtain vital signs or examine the patient due to telehealth visit,

## 2021-02-18 NOTE — HISTORY OF PRESENT ILLNESS
[Home] : at home, [unfilled] , at the time of the visit. [Other Location: e.g. Home (Enter Location, City,State)___] : at [unfilled] [Verbal consent obtained from patient] : the patient, [unfilled] [de-identified] : Patient presented for this telephonic visit because she is feeling better and planning to go back to work.  She needs a form filled out so that she may resume working.\par \par Since her last visit, the patient has improved.  She is no longer taking any narcotic analgesic.  She still has some pain in her knees and ankles, but is now ambulating with a walker, and she is trying to transition over to a cane.  She no longer uses a wheelchair.  Patient had some physical therapy, which has completed.  She is doing exercises learned from the therapy.\par \par She was having some pleuritic chest pain, and in fact she had to go to the ED 3 weeks ago due to her pain.  Evaluation was unremarkable.  Patient was told at that time that she should take her painkillers, which she did.  Her pleuritic chest pain has subsided, and she no longer needs any analgesics at this point.  She is still on anticoagulation therapy.\par \par Patient is planning to resume working as of 2/22/2021, she feels that she should be able to work from home remotely.  She is still not able to travel out of the house.\par \par She feels well otherwise, and denied any chest pain, shortness of breath, back pain, dizziness.  She is feeling and sleeping well with normal bowel and urinary function.

## 2021-04-16 ENCOUNTER — OUTPATIENT (OUTPATIENT)
Dept: OUTPATIENT SERVICES | Facility: HOSPITAL | Age: 26
LOS: 1 days | Discharge: HOME | End: 2021-04-16

## 2021-04-16 DIAGNOSIS — I26.99 OTHER PULMONARY EMBOLISM WITHOUT ACUTE COR PULMONALE: ICD-10-CM

## 2021-04-16 DIAGNOSIS — Z90.89 ACQUIRED ABSENCE OF OTHER ORGANS: Chronic | ICD-10-CM

## 2021-04-19 ENCOUNTER — RESULT REVIEW (OUTPATIENT)
Age: 26
End: 2021-04-19

## 2021-04-19 ENCOUNTER — APPOINTMENT (OUTPATIENT)
Dept: HEMATOLOGY ONCOLOGY | Facility: CLINIC | Age: 26
End: 2021-04-19
Payer: COMMERCIAL

## 2021-04-19 VITALS
TEMPERATURE: 97.7 F | BODY MASS INDEX: 36.66 KG/M2 | SYSTOLIC BLOOD PRESSURE: 114 MMHG | OXYGEN SATURATION: 100 % | HEART RATE: 71 BPM | RESPIRATION RATE: 16 BRPM | WEIGHT: 220.02 LBS | DIASTOLIC BLOOD PRESSURE: 74 MMHG | HEIGHT: 65 IN

## 2021-04-19 DIAGNOSIS — I82.409 ACUTE EMBOLISM AND THROMBOSIS OF UNSPECIFIED DEEP VEINS OF UNSPECIFIED LOWER EXTREMITY: ICD-10-CM

## 2021-04-19 LAB
BASOPHILS # BLD AUTO: 0.05 K/UL — SIGNIFICANT CHANGE UP (ref 0–0.2)
BASOPHILS NFR BLD AUTO: 1.1 % — SIGNIFICANT CHANGE UP (ref 0–2)
EOSINOPHIL # BLD AUTO: 0.25 K/UL — SIGNIFICANT CHANGE UP (ref 0–0.5)
EOSINOPHIL NFR BLD AUTO: 5.5 % — SIGNIFICANT CHANGE UP (ref 0–6)
HCT VFR BLD CALC: 35 % — SIGNIFICANT CHANGE UP (ref 34.5–45)
HGB BLD-MCNC: 10.2 G/DL — LOW (ref 11.5–15.5)
IMM GRANULOCYTES NFR BLD AUTO: 0.2 % — SIGNIFICANT CHANGE UP (ref 0–1.5)
LYMPHOCYTES # BLD AUTO: 1.9 K/UL — SIGNIFICANT CHANGE UP (ref 1–3.3)
LYMPHOCYTES # BLD AUTO: 41.8 % — SIGNIFICANT CHANGE UP (ref 13–44)
MCHC RBC-ENTMCNC: 20.6 PG — LOW (ref 27–34)
MCHC RBC-ENTMCNC: 29.1 G/DL — LOW (ref 32–36)
MCV RBC AUTO: 70.6 FL — LOW (ref 80–100)
MONOCYTES # BLD AUTO: 0.36 K/UL — SIGNIFICANT CHANGE UP (ref 0–0.9)
MONOCYTES NFR BLD AUTO: 7.9 % — SIGNIFICANT CHANGE UP (ref 2–14)
NEUTROPHILS # BLD AUTO: 1.98 K/UL — SIGNIFICANT CHANGE UP (ref 1.8–7.4)
NEUTROPHILS NFR BLD AUTO: 43.5 % — SIGNIFICANT CHANGE UP (ref 43–77)
NRBC # BLD: 0 /100 WBCS — SIGNIFICANT CHANGE UP (ref 0–0)
PLATELET # BLD AUTO: 272 K/UL — SIGNIFICANT CHANGE UP (ref 150–400)
RBC # BLD: 4.96 M/UL — SIGNIFICANT CHANGE UP (ref 3.8–5.2)
RBC # FLD: 16.7 % — HIGH (ref 10.3–14.5)
WBC # BLD: 4.55 K/UL — SIGNIFICANT CHANGE UP (ref 3.8–10.5)
WBC # FLD AUTO: 4.55 K/UL — SIGNIFICANT CHANGE UP (ref 3.8–10.5)

## 2021-04-19 PROCEDURE — 99214 OFFICE O/P EST MOD 30 MIN: CPT

## 2021-04-19 PROCEDURE — 99072 ADDL SUPL MATRL&STAF TM PHE: CPT

## 2021-04-20 PROBLEM — I82.409 DVT OF LEG (DEEP VENOUS THROMBOSIS): Status: ACTIVE | Noted: 2021-01-18

## 2021-04-26 LAB
PROT C PPP CHRO-ACNC: 86 %
PROT S AG ACT/NOR PPP IA: 14 %
PROT S FREE PPP-ACNC: 27 %

## 2021-04-26 NOTE — HISTORY OF PRESENT ILLNESS
[de-identified] :   25 year old woman with past medical history of recent tonsillectomy, bilateral pulmonary embolism and LLE DVT here for evaluation of hypercoagulability. Patient was admitted at [facility] from 1/1/2021-1/14/2021 for c/o chest pain. She was found to have bilateral lobar pulmonary embolism, LLE DVT from common iliac vein extending into internal iliac vein and left lower pole renal infarct. She was initially placed on Heparin drip and then changed to Fondaparinox (Arixtra). She underwent echocardiogram that showed patent foramen ovale. She had a tonsillectomy on 12/15/2020 and spent most of her time recovering in bed. She was on oral contraceptives for approx three months for dysmenorrhea. She also travelled to [place] by car on 12/24/2020 and returned on 12/26/2020 and did not make any rest stops to stretch. This AM, she had worsening chest pain. She went back to [facility] ER via ambulance. Repeat CTA showed decreased clot burden with residual emboli noted within the right upper and left lower segmental artery branches and near complete resolution of LLL pulmonary infarct. Also seen non-specific nodular and patchy ground glass opacities. She was discharged home on Arixtra. She c/o chest pain when laying down and occasional SOB. She is tolerating Arixtra well. Her mother is injecting her daily. She reports that LLE swelling has improved. She still has some soreness but is able to walk with walker. She has home PT once per week. She c/o some fatigue. She denies fevers, pleuritic chest pain, abd pain, n/v/d, black/bloody stools, dizziness, night sweats, swollen glands, hematuria, epistaxis, bleeding gums, unexplained bruising or rashes. She denies recent COVID infection or exposure. \par \par PMHx/PSHx:\par Bilateral lobar PE, LLE DVT, left renal infarct 1/2021\par Patent foramen ovale seen on echocardiogram 1/2021\par Tonsillectomy 12/15/2020 for chronic tonsillitis. Pathology negative. Dr. Brandon Manuel at [facility]\par Menorrhagia and irregular menses was on oral contraceptives X 3 months\par Denies history or pregnancies or miscarriages. \par \par Hospitalizations:\par [facility] 1/1/2021- 1/14/2021 for DVT/PE as described in HPI\par \par Medications:\par Oxycodone \par Fondaparinux 7.5mg SC daily\par \par Allergies: \par NKDA\par \par Social History:\par Lives with mother, father, sister and brother. Works as an . Graduated [employer] last year. Single. Denies smoking. Drinks socially. Born in [place]par \par Family History:\par Father alive- had DVT- risk factor was plane ride\par Mother alive- has RA and anemia- is on Humira and MTX\par Denies further family history of DVT, PE, CVA, miscarriages. \par \par Healthcare Maintenance:\par Dr. Kelly Kilpatrick- new primary care doctor\par Dr. Pham- vascular surgery- saw recently and recommended compression stockings. \par  \par  [de-identified] : Protein S deficiency but testing was done in February soon after the blood clot diagnosis. Patient presents for repeat today.  \par \par Patient has a trip in June but was concerned about travel.  Going to [place].  \par \par Patient notices swelling once in a while.  Usually goes to the medial side and calf feels tight.

## 2021-04-26 NOTE — ASSESSMENT
[FreeTextEntry1] : This is a 25 year old woman with a history of provoked venous thromboemboli.  Patient had a few partial risk factors, including some long car rides, and minor surgeries, however the major risk factor was the use of oral contraceptives in the moths preceding the DVT/PE.  Patient was noted to have a low protein S activity.  GIven that this was soon after the initial VTE events, could have bee serendipitously low as these levels are decreased in the setting of acute thrombosis. Will repeat today, if it is low this far out of initial VTE event it would rule in congenital protein S deficiency.  Will need to continue Eliquis 5mg BID for a total of 6 months ending in July will follow up with her at that point to discuss cessation of the anticoagulation therapy.  \par \par Addendum\par 4/26/21  Protein S antigen 14%, activity Activity 27% despite the passage of time since the initial thrombosis. Patient not on coumadin.  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 Reccomended she continue the Eliquis 5mg BID, will have her follow up in 3 months to discuss possible downgrade to the prophylactic 2.5mg BID dose, but should not stop elqiuis anticoagulation at any point.

## 2021-05-03 ENCOUNTER — OUTPATIENT (OUTPATIENT)
Dept: OUTPATIENT SERVICES | Facility: HOSPITAL | Age: 26
LOS: 1 days | End: 2021-05-03
Payer: COMMERCIAL

## 2021-05-03 ENCOUNTER — APPOINTMENT (OUTPATIENT)
Dept: ULTRASOUND IMAGING | Facility: IMAGING CENTER | Age: 26
End: 2021-05-03
Payer: COMMERCIAL

## 2021-05-03 DIAGNOSIS — Z90.89 ACQUIRED ABSENCE OF OTHER ORGANS: Chronic | ICD-10-CM

## 2021-05-03 DIAGNOSIS — I82.409 ACUTE EMBOLISM AND THROMBOSIS OF UNSPECIFIED DEEP VEINS OF UNSPECIFIED LOWER EXTREMITY: ICD-10-CM

## 2021-05-03 PROCEDURE — 93970 EXTREMITY STUDY: CPT

## 2021-05-03 PROCEDURE — 93970 EXTREMITY STUDY: CPT | Mod: 26

## 2021-06-08 ENCOUNTER — RX RENEWAL (OUTPATIENT)
Age: 26
End: 2021-06-08

## 2021-06-11 ENCOUNTER — RX RENEWAL (OUTPATIENT)
Age: 26
End: 2021-06-11

## 2021-06-28 ENCOUNTER — OUTPATIENT (OUTPATIENT)
Dept: OUTPATIENT SERVICES | Facility: HOSPITAL | Age: 26
LOS: 1 days | Discharge: HOME | End: 2021-06-28

## 2021-06-28 DIAGNOSIS — Z90.89 ACQUIRED ABSENCE OF OTHER ORGANS: Chronic | ICD-10-CM

## 2021-06-28 DIAGNOSIS — I26.99 OTHER PULMONARY EMBOLISM WITHOUT ACUTE COR PULMONALE: ICD-10-CM

## 2021-07-01 ENCOUNTER — APPOINTMENT (OUTPATIENT)
Dept: HEMATOLOGY ONCOLOGY | Facility: CLINIC | Age: 26
End: 2021-07-01

## 2021-07-28 ENCOUNTER — OUTPATIENT (OUTPATIENT)
Dept: OUTPATIENT SERVICES | Facility: HOSPITAL | Age: 26
LOS: 1 days | Discharge: HOME | End: 2021-07-28

## 2021-07-28 DIAGNOSIS — Z90.89 ACQUIRED ABSENCE OF OTHER ORGANS: Chronic | ICD-10-CM

## 2021-07-28 DIAGNOSIS — I26.01 SEPTIC PULMONARY EMBOLISM WITH ACUTE COR PULMONALE: ICD-10-CM

## 2021-07-29 ENCOUNTER — APPOINTMENT (OUTPATIENT)
Dept: HEMATOLOGY ONCOLOGY | Facility: CLINIC | Age: 26
End: 2021-07-29
Payer: COMMERCIAL

## 2021-07-29 VITALS
WEIGHT: 231.49 LBS | HEART RATE: 77 BPM | DIASTOLIC BLOOD PRESSURE: 78 MMHG | HEIGHT: 64.96 IN | BODY MASS INDEX: 38.57 KG/M2 | RESPIRATION RATE: 16 BRPM | SYSTOLIC BLOOD PRESSURE: 113 MMHG | TEMPERATURE: 98.2 F | OXYGEN SATURATION: 99 %

## 2021-07-29 PROCEDURE — 99214 OFFICE O/P EST MOD 30 MIN: CPT

## 2021-07-31 NOTE — ASSESSMENT
[FreeTextEntry1] : This is a 25 year old woman with a history of provoked venous thromboemboli Protien S deficiency confirmed. activity reliably under 50% at ~11%.  Patient had a few partial risk factors, including some long car rides, and minor surgeries, however the major risk factor was the use of oral contraceptives in the moths preceding the DVT/PE.  Patient was noted to have a low protein S activity.  GIven that this was soon after the initial VTE events, could have bee serendipitously low as these levels are decreased in the setting of acute thrombosis. Will repeat today, if it is low this far out of initial VTE event it would rule in congenital protein S deficiency.   Given the protein S deficiency would not discontinue anticoagulation but can decrease to prophylactic dose elquis at the 6 month tanmay for prophylaxis.

## 2021-07-31 NOTE — HISTORY OF PRESENT ILLNESS
[de-identified] :   26 year old woman with past medical history of recent tonsillectomy, bilateral pulmonary embolism and LLE DVT here for evaluation of hypercoagulability. Patient was admitted at [facility] from 1/1/2021-1/14/2021 for c/o chest pain. She was found to have bilateral lobar pulmonary embolism, LLE DVT from common iliac vein extending into internal iliac vein and left lower pole renal infarct. She was initially placed on Heparin drip and then changed to Fondaparinox (Arixtra). She underwent echocardiogram that showed patent foramen ovale. She had a tonsillectomy on 12/15/2020 and spent most of her time recovering in bed. She was on oral contraceptives for approx three months for dysmenorrhea. She also travelled to [place] by car on 12/24/2020 and returned on 12/26/2020 and did not make any rest stops to stretch. This AM, she had worsening chest pain. She went back to [facility] ER via ambulance. Repeat CTA showed decreased clot burden with residual emboli noted within the right upper and left lower segmental artery branches and near complete resolution of LLL pulmonary infarct. Also seen non-specific nodular and patchy ground glass opacities. She was discharged home on Arixtra. She c/o chest pain when laying down and occasional SOB. She is tolerating Arixtra well. Her mother is injecting her daily. She reports that LLE swelling has improved. She still has some soreness but is able to walk with walker. She has home PT once per week. She c/o some fatigue. She denies fevers, pleuritic chest pain, abd pain, n/v/d, black/bloody stools, dizziness, night sweats, swollen glands, hematuria, epistaxis, bleeding gums, unexplained bruising or rashes. She denies recent COVID infection or exposure. \par \par PMHx/PSHx:\par Bilateral lobar PE, LLE DVT, left renal infarct 1/2021\par Patent foramen ovale seen on echocardiogram 1/2021\par Tonsillectomy 12/15/2020 for chronic tonsillitis. Pathology negative. Dr. Brandon Manuel at [facility]\par Menorrhagia and irregular menses was on oral contraceptives X 3 months\par Denies history or pregnancies or miscarriages. \par \par Hospitalizations:\par [facility] 1/1/2021- 1/14/2021 for DVT/PE as described in HPI\par \par Medications:\par Oxycodone \par Fondaparinux 7.5mg SC daily\par \par Allergies: \par NKDA\par \par Social History:\par Lives with mother, father, sister and brother. Works as an . Graduated [employer] last year. Single. Denies smoking. Drinks socially. Born in [place]par \par Family History:\par Father alive- had DVT- risk factor was plane ride\par Mother alive- has RA and anemia- is on Humira and MTX\par Denies further family history of DVT, PE, CVA, miscarriages. \par \par Healthcare Maintenance:\par Dr. Kelly Kilpatrick- new primary care doctor\par Dr. Pham- vascular surgery- saw recently and recommended compression stockings. \par  \par  [de-identified] : Patient wearing compression stocking but lost one scraped the other.  \par Protein S deficiency Repeated numbers 14% origin from January 11th 2021.

## 2021-09-12 NOTE — H&P ADULT - NSRESEARCHGRNTASSESS_GEN_ALL_CORE FT
Gen: No fever, normal appetite  Eyes: No eye irritation or discharge  ENT: No ear pain, congestion, sore throat  Resp: No cough or trouble breathing  Cardiovascular: see HPI  Gastroenteric: No nausea/vomiting, diarrhea, constipation  :  No change in urine output; no dysuria  MS: No joint or muscle pain  Skin: No rashes  Neuro: No headache; no abnormal movements  Remainder negative, except as per the HPI IMPROVE-DD Assessment completed: Jan 2 2021  8:32AM

## 2021-11-09 ENCOUNTER — OUTPATIENT (OUTPATIENT)
Dept: OUTPATIENT SERVICES | Facility: HOSPITAL | Age: 26
LOS: 1 days | Discharge: HOME | End: 2021-11-09

## 2021-11-09 DIAGNOSIS — Z90.89 ACQUIRED ABSENCE OF OTHER ORGANS: Chronic | ICD-10-CM

## 2021-11-09 DIAGNOSIS — I26.99 OTHER PULMONARY EMBOLISM WITHOUT ACUTE COR PULMONALE: ICD-10-CM

## 2021-11-11 ENCOUNTER — APPOINTMENT (OUTPATIENT)
Dept: HEMATOLOGY ONCOLOGY | Facility: CLINIC | Age: 26
End: 2021-11-11

## 2022-01-04 ENCOUNTER — NON-APPOINTMENT (OUTPATIENT)
Age: 27
End: 2022-01-04

## 2022-01-04 ENCOUNTER — EMERGENCY (EMERGENCY)
Facility: HOSPITAL | Age: 27
LOS: 1 days | Discharge: HOME | End: 2022-01-04
Attending: HOSPITALIST | Admitting: HOSPITALIST
Payer: COMMERCIAL

## 2022-01-04 ENCOUNTER — OUTPATIENT (OUTPATIENT)
Dept: OUTPATIENT SERVICES | Facility: HOSPITAL | Age: 27
LOS: 1 days | Discharge: HOME | End: 2022-01-04

## 2022-01-04 VITALS
OXYGEN SATURATION: 100 % | SYSTOLIC BLOOD PRESSURE: 128 MMHG | DIASTOLIC BLOOD PRESSURE: 78 MMHG | TEMPERATURE: 98 F | RESPIRATION RATE: 16 BRPM | HEART RATE: 86 BPM

## 2022-01-04 VITALS
HEART RATE: 99 BPM | TEMPERATURE: 98 F | RESPIRATION RATE: 22 BRPM | HEIGHT: 67 IN | SYSTOLIC BLOOD PRESSURE: 125 MMHG | DIASTOLIC BLOOD PRESSURE: 73 MMHG | OXYGEN SATURATION: 98 %

## 2022-01-04 DIAGNOSIS — Z90.89 ACQUIRED ABSENCE OF OTHER ORGANS: Chronic | ICD-10-CM

## 2022-01-04 DIAGNOSIS — I26.99 OTHER PULMONARY EMBOLISM WITHOUT ACUTE COR PULMONALE: ICD-10-CM

## 2022-01-04 LAB
ALBUMIN SERPL ELPH-MCNC: 4.3 G/DL — SIGNIFICANT CHANGE UP (ref 3.3–5)
ALP SERPL-CCNC: 57 U/L — SIGNIFICANT CHANGE UP (ref 40–120)
ALT FLD-CCNC: 13 U/L — SIGNIFICANT CHANGE UP (ref 4–33)
ANION GAP SERPL CALC-SCNC: 13 MMOL/L — SIGNIFICANT CHANGE UP (ref 7–14)
ANISOCYTOSIS BLD QL: SIGNIFICANT CHANGE UP
APTT BLD: 36.1 SEC — SIGNIFICANT CHANGE UP (ref 27–36.3)
AST SERPL-CCNC: 13 U/L — SIGNIFICANT CHANGE UP (ref 4–32)
B PERT DNA SPEC QL NAA+PROBE: SIGNIFICANT CHANGE UP
B PERT+PARAPERT DNA PNL SPEC NAA+PROBE: SIGNIFICANT CHANGE UP
BASOPHILS # BLD AUTO: 0.09 K/UL — SIGNIFICANT CHANGE UP (ref 0–0.2)
BASOPHILS NFR BLD AUTO: 1 % — SIGNIFICANT CHANGE UP (ref 0–2)
BILIRUB SERPL-MCNC: <0.2 MG/DL — SIGNIFICANT CHANGE UP (ref 0.2–1.2)
BORDETELLA PARAPERTUSSIS (RAPRVP): SIGNIFICANT CHANGE UP
BUN SERPL-MCNC: 16 MG/DL — SIGNIFICANT CHANGE UP (ref 7–23)
C PNEUM DNA SPEC QL NAA+PROBE: SIGNIFICANT CHANGE UP
CALCIUM SERPL-MCNC: 9.2 MG/DL — SIGNIFICANT CHANGE UP (ref 8.4–10.5)
CHLORIDE SERPL-SCNC: 105 MMOL/L — SIGNIFICANT CHANGE UP (ref 98–107)
CO2 SERPL-SCNC: 20 MMOL/L — LOW (ref 22–31)
CREAT SERPL-MCNC: 1.04 MG/DL — SIGNIFICANT CHANGE UP (ref 0.5–1.3)
EOSINOPHIL # BLD AUTO: 0.87 K/UL — HIGH (ref 0–0.5)
EOSINOPHIL NFR BLD AUTO: 9.2 % — HIGH (ref 0–6)
FLUAV SUBTYP SPEC NAA+PROBE: SIGNIFICANT CHANGE UP
FLUBV RNA SPEC QL NAA+PROBE: SIGNIFICANT CHANGE UP
GLUCOSE SERPL-MCNC: 84 MG/DL — SIGNIFICANT CHANGE UP (ref 70–99)
HADV DNA SPEC QL NAA+PROBE: SIGNIFICANT CHANGE UP
HCG SERPL-ACNC: <5 MIU/ML — SIGNIFICANT CHANGE UP
HCOV 229E RNA SPEC QL NAA+PROBE: SIGNIFICANT CHANGE UP
HCOV HKU1 RNA SPEC QL NAA+PROBE: SIGNIFICANT CHANGE UP
HCOV NL63 RNA SPEC QL NAA+PROBE: SIGNIFICANT CHANGE UP
HCOV OC43 RNA SPEC QL NAA+PROBE: SIGNIFICANT CHANGE UP
HCT VFR BLD CALC: 36.2 % — SIGNIFICANT CHANGE UP (ref 34.5–45)
HGB BLD-MCNC: 10.7 G/DL — LOW (ref 11.5–15.5)
HMPV RNA SPEC QL NAA+PROBE: SIGNIFICANT CHANGE UP
HPIV1 RNA SPEC QL NAA+PROBE: SIGNIFICANT CHANGE UP
HPIV2 RNA SPEC QL NAA+PROBE: SIGNIFICANT CHANGE UP
HPIV3 RNA SPEC QL NAA+PROBE: SIGNIFICANT CHANGE UP
HPIV4 RNA SPEC QL NAA+PROBE: SIGNIFICANT CHANGE UP
IANC: 4.6 K/UL — SIGNIFICANT CHANGE UP (ref 1.5–8.5)
INR BLD: 1.15 RATIO — SIGNIFICANT CHANGE UP (ref 0.88–1.16)
LYMPHOCYTES # BLD AUTO: 2.32 K/UL — SIGNIFICANT CHANGE UP (ref 1–3.3)
LYMPHOCYTES # BLD AUTO: 24.5 % — SIGNIFICANT CHANGE UP (ref 13–44)
M PNEUMO DNA SPEC QL NAA+PROBE: SIGNIFICANT CHANGE UP
MANUAL SMEAR VERIFICATION: SIGNIFICANT CHANGE UP
MCHC RBC-ENTMCNC: 20.2 PG — LOW (ref 27–34)
MCHC RBC-ENTMCNC: 29.6 GM/DL — LOW (ref 32–36)
MCV RBC AUTO: 68.4 FL — LOW (ref 80–100)
MICROCYTES BLD QL: SIGNIFICANT CHANGE UP
MONOCYTES # BLD AUTO: 0.87 K/UL — SIGNIFICANT CHANGE UP (ref 0–0.9)
MONOCYTES NFR BLD AUTO: 9.2 % — SIGNIFICANT CHANGE UP (ref 2–14)
NEUTROPHILS # BLD AUTO: 4.91 K/UL — SIGNIFICANT CHANGE UP (ref 1.8–7.4)
NEUTROPHILS NFR BLD AUTO: 51 % — SIGNIFICANT CHANGE UP (ref 43–77)
NEUTS BAND # BLD: 1 % — SIGNIFICANT CHANGE UP (ref 0–6)
NT-PROBNP SERPL-SCNC: 23 PG/ML — SIGNIFICANT CHANGE UP
OVALOCYTES BLD QL SMEAR: SLIGHT — SIGNIFICANT CHANGE UP
PLAT MORPH BLD: NORMAL — SIGNIFICANT CHANGE UP
PLATELET # BLD AUTO: 272 K/UL — SIGNIFICANT CHANGE UP (ref 150–400)
PLATELET COUNT - ESTIMATE: NORMAL — SIGNIFICANT CHANGE UP
POIKILOCYTOSIS BLD QL AUTO: SLIGHT — SIGNIFICANT CHANGE UP
POLYCHROMASIA BLD QL SMEAR: SLIGHT — SIGNIFICANT CHANGE UP
POTASSIUM SERPL-MCNC: 4.1 MMOL/L — SIGNIFICANT CHANGE UP (ref 3.5–5.3)
POTASSIUM SERPL-SCNC: 4.1 MMOL/L — SIGNIFICANT CHANGE UP (ref 3.5–5.3)
PROT SERPL-MCNC: 7.8 G/DL — SIGNIFICANT CHANGE UP (ref 6–8.3)
PROTHROM AB SERPL-ACNC: 13.1 SEC — SIGNIFICANT CHANGE UP (ref 10.6–13.6)
RAPID RVP RESULT: DETECTED
RBC # BLD: 5.29 M/UL — HIGH (ref 3.8–5.2)
RBC # FLD: 19.2 % — HIGH (ref 10.3–14.5)
RBC BLD AUTO: ABNORMAL
RSV RNA SPEC QL NAA+PROBE: SIGNIFICANT CHANGE UP
RV+EV RNA SPEC QL NAA+PROBE: SIGNIFICANT CHANGE UP
SARS-COV-2 RNA SPEC QL NAA+PROBE: DETECTED
SMUDGE CELLS # BLD: PRESENT — SIGNIFICANT CHANGE UP
SODIUM SERPL-SCNC: 138 MMOL/L — SIGNIFICANT CHANGE UP (ref 135–145)
TROPONIN T, HIGH SENSITIVITY RESULT: <6 NG/L — SIGNIFICANT CHANGE UP
VARIANT LYMPHS # BLD: 4.1 % — SIGNIFICANT CHANGE UP (ref 0–6)
WBC # BLD: 9.45 K/UL — SIGNIFICANT CHANGE UP (ref 3.8–10.5)
WBC # FLD AUTO: 9.45 K/UL — SIGNIFICANT CHANGE UP (ref 3.8–10.5)

## 2022-01-04 PROCEDURE — 93010 ELECTROCARDIOGRAM REPORT: CPT

## 2022-01-04 PROCEDURE — 71275 CT ANGIOGRAPHY CHEST: CPT | Mod: 26,MA

## 2022-01-04 PROCEDURE — 99285 EMERGENCY DEPT VISIT HI MDM: CPT | Mod: 25

## 2022-01-04 RX ORDER — ALBUTEROL 90 UG/1
4 AEROSOL, METERED ORAL ONCE
Refills: 0 | Status: COMPLETED | OUTPATIENT
Start: 2022-01-04 | End: 2022-01-04

## 2022-01-04 RX ADMIN — ALBUTEROL 4 PUFF(S): 90 AEROSOL, METERED ORAL at 01:59

## 2022-01-04 NOTE — ED PROVIDER NOTE - NS ED ROS FT
General: no fever, no chills  Eyes: no vision changes, no eye pain  Cardiovascular: +chest pain, no edema  Respiratory: +cough, +shortness of breath  Gastrointestinal: no abdominal pain, no nausea, no vomiting, no diarrhea  Genitourinary: no dysuria, no hematuria  Musculoskeletal: no muscle pain, no joint pain  Skin: no rash, no lesions  Neuro: no numbness, no tingling  Psych: no depression, no anxiety

## 2022-01-04 NOTE — ED ADULT TRIAGE NOTE - CHIEF COMPLAINT QUOTE
Pt had covid two weeks ago and now she has chest tightness and SOB and cough. Also has chest pain radiating to the back. Pt has PMH of PE 01/21 and DVT and is on Eliquis. Denies fever now.

## 2022-01-04 NOTE — ED ADULT NURSE REASSESSMENT NOTE - NS ED NURSE REASSESS COMMENT FT1
report received from overnight rn. YANI. pt denies any complaints at this time. respirations are even and un labored. safety precautions maintained.
Patient resting comfortably in stretcher. Denies chest pain, headache and dizziness at this time. Patient breathing even and unlabored. No pallor or diaphoresis noted at this time.

## 2022-01-04 NOTE — ED PROVIDER NOTE - NSFOLLOWUPINSTRUCTIONS_ED_ALL_ED_FT
1) Follow up with your PCP and hematologist within 1-3 days of being seen in the Emergency Department   2) Return to the ED immediately for new or worsening symptoms severe pain, difficulty breathing, chest discomfort or pain, uncontrollable fever  3) Please continue to take any home medications as prescribed  4) Your test results from your ED visit were discussed with you prior to discharge  5) You were provided with a copy of your test results  6) Please use the Albuterol inhaler 4 puffs with the spacer, every 4 hours as needed for shortness of breath 1) Follow up with your PCP and hematologist within 1-3 days of being seen in the Emergency Department   2) Return to the ED immediately for new or worsening symptoms severe pain, difficulty breathing, chest discomfort or pain, uncontrollable fever  3) Please continue to take any home medications as prescribed  4) Your test results from your ED visit were discussed with you prior to discharge  5) You were provided with a copy of your test results  6) Please use the Albuterol inhaler 4 puffs with the spacer, every 4 hours as needed for shortness of breath  7) Take tylenol and advil as needed.

## 2022-01-04 NOTE — ED PROVIDER NOTE - OBJECTIVE STATEMENT
26 year old female with a pmhx of DVT/PE on Eliquis, PFO, COVID-19 infection 2 weeks ago, presents to ED for evaluation of cp and sob. Pt reports was fully vaccinated for COVID-19, recovered without complication. Notes feeling better the past week, but then suddenly got short of breath 2 days ago. Has cough, chest pain with sob. Hoped it would go away, but it hasn't. Reports being compliant with Eliquis, and hasn't missed any doses, though mentions her hematologist lowered the dose a few months ago. No fever, chills, vomiting, diarrhea, dysuria. No hx of asthma, but did use an inhaler during cheer. Was not hospitalized for COVID-19 infection.

## 2022-01-04 NOTE — ED PROVIDER NOTE - PHYSICAL EXAMINATION
General: well appearing, no acute distress, AOx3  Skin: no rash, no pallor  Head: normocephalic, atraumatic  Eyes: clear conjunctiva, EOMI  ENMT: airway patent, no nasal discharge  Cardiovascular: normal rate, normal rhythm, S1/S2  Pulmonary: diffuse wheeze, dry cough on exam, mild tachypnea   Abdomen: soft, nontender  Musculoskeletal: moving extremities well, no deformity  Psych: normal mood, normal affect

## 2022-01-04 NOTE — ED PROVIDER NOTE - CLINICAL SUMMARY MEDICAL DECISION MAKING FREE TEXT BOX
Home O2 eval completed    Resting no O2: 88%   Ambulation with no O2: 86%   Resting post ambulation no O2: 82-85%       Resting 4.5L: 90-91%  Ambulation 4.5L: 86-88%       Resting on 5L O2: 92-95%   Ambulation on 5L O2: 88-89%     Will notify MD.    David Ludwig, PGY-2- 26 year old female with a pmhx of PE, DVT, PFO, here with cp, sob, cough in setting of recent covid-19 infection. Pt with tachypnea on arrival. Otherwise stable vitals. Diffuse wheeze on exam. Given albuterol inhaler with much improvement. Plan to obtain cbc, cmp, trp, pro-bnp, coags, cta chest with iv contast, ekg. Pt high risk for PE, therefore CTA. Will eval for post-covid PNA. DDx includes but is not limited to- PE, viral syndrome, bacterial PNA, RAD

## 2022-01-04 NOTE — ED PROVIDER NOTE - PROGRESS NOTE DETAILS
David Ludwig, PGY-2- Patient improved with Albuterol inhaler, feels much better. Param PGY-2: Patient reassessed, resting comfortably, discussed negative CTA for PE but evidence for b/l lung changes consistent with COVID infection. Patient agreeable to dc home with continued symptomatic care. Strict return precautions given. PMD f/u.

## 2022-01-04 NOTE — ED ADULT NURSE NOTE - OBJECTIVE STATEMENT
pt received to rm 12, a&ox4 , ambulatory , p/w cough, sob, chest tightness for 2 w . Pt breathing even and slightly labored on room air. Denies  palpitations, dizziness, N/V/D, constipation, numbness, tingling. IV placed R #20G . Labs collected and sent. pt educated on fall precautions and confirms understanding via teach back method. Stretcher locked in lowest position with siderails up x2. Call bell and personal items within reach.

## 2022-01-04 NOTE — ED PROVIDER NOTE - PATIENT PORTAL LINK FT
You can access the FollowMyHealth Patient Portal offered by [facility] by registering at the following website: [email or web address]. By joining [facility]’s FollowMyHealth portal, you will also be able to view your health information using other applications (apps) compatible with our system.

## 2022-01-04 NOTE — ED PROVIDER NOTE - ATTENDING CONTRIBUTION TO CARE
26F with hx of DVT/PE on Eliquis, Protein S deficiency, PFO, Covid-19 (dx 2 weeks ago), reports feeling of sob x2 days. patient was feeling better from covid dx for about a week, then suddenly developed sob 2 days ago with cp and cough. no fever, chills. adds that her Eliquis dose was lowered a few months ago to 2.5mg BID from 5mg BID by her Hematologist.     ***GEN - NAD; well appearing; A+O x3 ***HEAD - NC/AT ***EYES/NOSE - PERRL, EOMI, mucous membranes moist, no discharge ***THROAT: Oral cavity and pharynx normal. No inflammation, swelling, exudate, or lesions.  ***NECK: Neck supple, non-tender without lymphadenopathy, no masses, no thyromegaly.   ***PULMONARY - expiratory wheezes b/l. ***CARDIAC -s1s2, RRR, no M,G,R  ***ABDOMEN - +BS, ND, NT, soft, no guarding, no rebound, no masses   ***BACK - no CVA tenderness, Normal  spine ***EXTREMITIES - symmetric pulses, 2+ dp, capillary refill < 2 seconds, no clubbing, no cyanosis, no edema ***SKIN - no rash or bruising   ***NEUROLOGIC - alert, CN 2-12 intact    MDM: 26F with sob, cp and cough with wheezing. likely viral but will r/o PE in the setting of prior PE and Protein S deficiency. labs, albuterol, CTA chest.

## 2022-01-19 ENCOUNTER — APPOINTMENT (OUTPATIENT)
Dept: HEMATOLOGY ONCOLOGY | Facility: CLINIC | Age: 27
End: 2022-01-19
Payer: COMMERCIAL

## 2022-01-19 PROCEDURE — 99442: CPT | Mod: 95

## 2022-01-22 PROBLEM — I26.99 OTHER PULMONARY EMBOLISM WITHOUT ACUTE COR PULMONALE: Chronic | Status: ACTIVE | Noted: 2022-01-04

## 2022-01-22 PROBLEM — I82.409 ACUTE EMBOLISM AND THROMBOSIS OF UNSPECIFIED DEEP VEINS OF UNSPECIFIED LOWER EXTREMITY: Chronic | Status: ACTIVE | Noted: 2022-01-04

## 2022-01-22 PROBLEM — Q21.1 ATRIAL SEPTAL DEFECT: Chronic | Status: ACTIVE | Noted: 2022-01-04

## 2022-01-25 NOTE — HISTORY OF PRESENT ILLNESS
[de-identified] : Last year confirmed protein S defciency f ree antigen 14% in Aprin 2021 and 11% prior to that Jan 2021.  Remains on \par \par Patient was sick from covid COVID 19, moderate  disease on December 20th.  Was in the hospital on January 3rd discharge the next day.  \par Patient has not noticed any excessive bleeding, or heavy menstrual peirods. HAs been better after the decreased dose from 5mg to 2.5mg.   Had a bruise after hitting her arm once tbut robin was all. \par \par CT angio in the ER demonstrated no PE,just ground glass opacities bilaterally. \par \par Patient describes a strange feeling behind the knee.  \par \par Admits to taking the Ferrous sulfate every other day, but Hg up to 10.7g/dl up from 10.2 in april despite that.

## 2022-01-25 NOTE — ASSESSMENT
[FreeTextEntry1] : This is a 25 year old woman with a history of provoked venous thromboemboli Protein S deficiency confirmed. activity reliably under 50% at ~11%.  Patient had a few partial risk factors, including some long car rides, and minor surgeries, however the major risk factor was the use of oral contraceptives in the moths preceding the DVT/PE.  Patient was noted to have a low protein S activity.  GIven that this was soon after the initial VTE events, could have bee serendipitously low as these levels are decreased in the setting of acute thrombosis. Will repeat today, if it is low this far out of initial VTE event it would rule in congenital protein S deficiency.   Given the protein S deficiency would not discontinue anticoagulation but can decrease to prophylactic dose elquis at the 6 month tanmay for prophylaxis.

## 2022-06-24 NOTE — ED ADULT NURSE NOTE - NSFALLRSKASSESSDT_ED_ALL_ED
Precert requested through medical [place] review Link for acute inpatient rehab admission Case Key: [id number].    Electronically signed by Jessica Cruz RN on 6/24/22 at 1:13 PM EDT 27-Jan-2021 04:46

## 2022-06-27 ENCOUNTER — OUTPATIENT (OUTPATIENT)
Dept: OUTPATIENT SERVICES | Facility: HOSPITAL | Age: 27
LOS: 1 days | Discharge: HOME | End: 2022-06-27

## 2022-06-27 DIAGNOSIS — Z90.89 ACQUIRED ABSENCE OF OTHER ORGANS: Chronic | ICD-10-CM

## 2022-06-27 DIAGNOSIS — I26.99 OTHER PULMONARY EMBOLISM WITHOUT ACUTE COR PULMONALE: ICD-10-CM

## 2022-07-11 ENCOUNTER — APPOINTMENT (OUTPATIENT)
Dept: HEMATOLOGY ONCOLOGY | Facility: CLINIC | Age: 27
End: 2022-07-11

## 2023-03-24 ENCOUNTER — RX RENEWAL (OUTPATIENT)
Age: 28
End: 2023-03-24

## 2023-03-27 ENCOUNTER — RX RENEWAL (OUTPATIENT)
Age: 28
End: 2023-03-27

## 2023-04-19 ENCOUNTER — OUTPATIENT (OUTPATIENT)
Dept: OUTPATIENT SERVICES | Facility: HOSPITAL | Age: 28
LOS: 1 days | Discharge: HOME | End: 2023-04-19

## 2023-04-19 DIAGNOSIS — I26.90 SEPTIC PULMONARY EMBOLISM WITHOUT ACUTE COR PULMONALE: ICD-10-CM

## 2023-04-19 DIAGNOSIS — Z90.89 ACQUIRED ABSENCE OF OTHER ORGANS: Chronic | ICD-10-CM

## 2023-04-21 ENCOUNTER — RESULT REVIEW (OUTPATIENT)
Age: 28
End: 2023-04-21

## 2023-04-21 ENCOUNTER — APPOINTMENT (OUTPATIENT)
Dept: HEMATOLOGY ONCOLOGY | Facility: CLINIC | Age: 28
End: 2023-04-21
Payer: COMMERCIAL

## 2023-04-21 VITALS
OXYGEN SATURATION: 99 % | TEMPERATURE: 97.9 F | HEART RATE: 85 BPM | RESPIRATION RATE: 16 BRPM | HEIGHT: 64 IN | SYSTOLIC BLOOD PRESSURE: 130 MMHG | WEIGHT: 236.31 LBS | BODY MASS INDEX: 40.34 KG/M2 | DIASTOLIC BLOOD PRESSURE: 85 MMHG

## 2023-04-21 LAB
ANISOCYTOSIS BLD QL: SLIGHT — SIGNIFICANT CHANGE UP
BASOPHILS # BLD AUTO: 0.04 K/UL — SIGNIFICANT CHANGE UP (ref 0–0.2)
BASOPHILS NFR BLD AUTO: 0.7 % — SIGNIFICANT CHANGE UP (ref 0–2)
EOSINOPHIL # BLD AUTO: 0.11 K/UL — SIGNIFICANT CHANGE UP (ref 0–0.5)
EOSINOPHIL NFR BLD AUTO: 1.9 % — SIGNIFICANT CHANGE UP (ref 0–6)
HCT VFR BLD CALC: 30.7 % — LOW (ref 34.5–45)
HGB BLD-MCNC: 8.4 G/DL — LOW (ref 11.5–15.5)
HYPOCHROMIA BLD QL: SLIGHT — SIGNIFICANT CHANGE UP
IMM GRANULOCYTES NFR BLD AUTO: 0.2 % — SIGNIFICANT CHANGE UP (ref 0–0.9)
LG PLATELETS BLD QL AUTO: SLIGHT — SIGNIFICANT CHANGE UP
LYMPHOCYTES # BLD AUTO: 2.5 K/UL — SIGNIFICANT CHANGE UP (ref 1–3.3)
LYMPHOCYTES # BLD AUTO: 43.3 % — SIGNIFICANT CHANGE UP (ref 13–44)
MCHC RBC-ENTMCNC: 17.2 PG — LOW (ref 27–34)
MCHC RBC-ENTMCNC: 27.4 G/DL — LOW (ref 32–36)
MCV RBC AUTO: 63 FL — LOW (ref 80–100)
MICROCYTES BLD QL: SIGNIFICANT CHANGE UP
MONOCYTES # BLD AUTO: 0.55 K/UL — SIGNIFICANT CHANGE UP (ref 0–0.9)
MONOCYTES NFR BLD AUTO: 9.5 % — SIGNIFICANT CHANGE UP (ref 2–14)
NEUTROPHILS # BLD AUTO: 2.57 K/UL — SIGNIFICANT CHANGE UP (ref 1.8–7.4)
NEUTROPHILS NFR BLD AUTO: 44.4 % — SIGNIFICANT CHANGE UP (ref 43–77)
NRBC # BLD: 0 /100 WBCS — SIGNIFICANT CHANGE UP (ref 0–0)
PLAT MORPH BLD: NORMAL — SIGNIFICANT CHANGE UP
PLATELET # BLD AUTO: 318 K/UL — SIGNIFICANT CHANGE UP (ref 150–400)
POIKILOCYTOSIS BLD QL AUTO: SLIGHT — SIGNIFICANT CHANGE UP
POLYCHROMASIA BLD QL SMEAR: SLIGHT — SIGNIFICANT CHANGE UP
RBC # BLD: 4.87 M/UL — SIGNIFICANT CHANGE UP (ref 3.8–5.2)
RBC # FLD: 21.4 % — HIGH (ref 10.3–14.5)
RBC BLD AUTO: ABNORMAL
RETICS #: 65.7 K/UL — SIGNIFICANT CHANGE UP (ref 25–125)
RETICS/RBC NFR: 1.4 % — SIGNIFICANT CHANGE UP (ref 0.5–2.5)
SCHISTOCYTES BLD QL AUTO: SLIGHT — SIGNIFICANT CHANGE UP
WBC # BLD: 5.78 K/UL — SIGNIFICANT CHANGE UP (ref 3.8–10.5)
WBC # FLD AUTO: 5.78 K/UL — SIGNIFICANT CHANGE UP (ref 3.8–10.5)

## 2023-04-21 PROCEDURE — 99214 OFFICE O/P EST MOD 30 MIN: CPT

## 2023-04-22 NOTE — HISTORY OF PRESENT ILLNESS
[de-identified] : Hx of protein S deficiency Free ag Protein S was 11-14% Has been taking iron tablets about once a week. Feels tired.  \par Did see an OBGYN.  Patient had an anemia noted, they reccomended iron tablets, but does not remember the number.  \par \par Moved to [place], is only here for the weekend.  \par Ferrous sulfate does not necessarily upset her stomach, but sometimes they do.

## 2023-04-22 NOTE — ASSESSMENT
[FreeTextEntry1] : This is a 27 year old woman with a history of provoked venous thromboemboli Protein S deficiency confirmed. activity reliably under 50% at ~11%.  Patient had a few partial risk factors, including some long car rides, and minor surgeries, however the major risk factor was the use of oral contraceptives in the moths preceding the DVT/PE.  Patient was noted to have a low protein S activity which continued to be low.  Given hypercoagulable state, protein S deficiency, will continue elqiuis at the prophylactic dose 2.5mg BID. Explained to patient that there is no guidance for taking the dose as 5mg once a day.  as it was meant to be a  BID medication, we do have Xarelto at the 10mg dose which is equivalent, however patient is weary of starting a new medication.

## 2023-04-26 ENCOUNTER — NON-APPOINTMENT (OUTPATIENT)
Age: 28
End: 2023-04-26

## 2023-05-02 DIAGNOSIS — D50.9 IRON DEFICIENCY ANEMIA, UNSPECIFIED: ICD-10-CM

## 2023-05-02 LAB
ALBUMIN SERPL ELPH-MCNC: 4.6 G/DL
ALP BLD-CCNC: 60 U/L
ALT SERPL-CCNC: 17 U/L
ANION GAP SERPL CALC-SCNC: 10 MMOL/L
AST SERPL-CCNC: 17 U/L
BILIRUB SERPL-MCNC: 0.3 MG/DL
BUN SERPL-MCNC: 15 MG/DL
CALCIUM SERPL-MCNC: 9.7 MG/DL
CHLORIDE SERPL-SCNC: 106 MMOL/L
CO2 SERPL-SCNC: 23 MMOL/L
CREAT SERPL-MCNC: 0.95 MG/DL
EGFR: 85 ML/MIN/1.73M2
FERRITIN SERPL-MCNC: 5 NG/ML
FOLATE SERPL-MCNC: 8 NG/ML
GLUCOSE SERPL-MCNC: 81 MG/DL
IRON SATN MFR SERPL: 6 %
IRON SERPL-MCNC: 26 UG/DL
POTASSIUM SERPL-SCNC: 4.5 MMOL/L
PROT S AG ACT/NOR PPP IA: 13 %
PROT S FREE PPP-ACNC: 24 %
PROT SERPL-MCNC: 7.5 G/DL
SODIUM SERPL-SCNC: 139 MMOL/L
TIBC SERPL-MCNC: 448 UG/DL
UIBC SERPL-MCNC: 423 UG/DL
VIT B12 SERPL-MCNC: 498 PG/ML

## 2023-10-13 ENCOUNTER — OUTPATIENT (OUTPATIENT)
Dept: OUTPATIENT SERVICES | Facility: HOSPITAL | Age: 28
LOS: 1 days | Discharge: HOME | End: 2023-10-13

## 2023-10-13 DIAGNOSIS — Z90.89 ACQUIRED ABSENCE OF OTHER ORGANS: Chronic | ICD-10-CM

## 2023-10-13 DIAGNOSIS — I26.99 OTHER PULMONARY EMBOLISM WITHOUT ACUTE COR PULMONALE: ICD-10-CM

## 2023-10-19 NOTE — PROGRESS NOTE ADULT - PROBLEM SELECTOR PLAN 3
Detail Level: Detailed Denies dysuria and other renal complaints. Cr 0.93 on admission.  - Pain management as below  - UA on admission w/ bacteria, blood. However, also with many epithelial cells, likely contaminant. No need for antibiotics presently.  - UC sent in ED, f/u results. Depth Of Biopsy: dermis Was A Bandage Applied: Yes Size Of Lesion In Cm: 0 Biopsy Type: H and E Biopsy Method: Dermablade Anesthesia Type: 1% lidocaine with epinephrine Anesthesia Volume In Cc (Will Not Render If 0): 0.5 Hemostasis: Aluminum Chloride and Electrocautery Wound Care: Petrolatum Dressing: bandage Destruction After The Procedure: No Type Of Destruction Used: Curettage Curettage Text: The wound bed was treated with curettage after the biopsy was performed. Cryotherapy Text: The wound bed was treated with cryotherapy after the biopsy was performed. Electrodesiccation Text: The wound bed was treated with electrodesiccation after the biopsy was performed. Electrodesiccation And Curettage Text: The wound bed was treated with electrodesiccation and curettage after the biopsy was performed. Silver Nitrate Text: The wound bed was treated with silver nitrate after the biopsy was performed. Lab: 6 Consent: Written consent was obtained and risks were reviewed including but not limited to scarring, infection, bleeding, scabbing, incomplete removal, nerve damage and allergy to anesthesia. Post-Care Instructions: I reviewed with the patient in detail post-care instructions. Patient is to keep the biopsy site dry overnight, and then apply bacitracin twice daily until healed. Patient may apply hydrogen peroxide soaks to remove any crusting. Notification Instructions: Patient will be notified of biopsy results. However, patient instructed to call the office if not contacted within 2 weeks. Billing Type: Third-Party Bill Information: Selecting Yes will display possible errors in your note based on the variables you have selected. This validation is only offered as a suggestion for you. PLEASE NOTE THAT THE VALIDATION TEXT WILL BE REMOVED WHEN YOU FINALIZE YOUR NOTE. IF YOU WANT TO FAX A PRELIMINARY NOTE YOU WILL NEED TO TOGGLE THIS TO 'NO' IF YOU DO NOT WANT IT IN YOUR FAXED NOTE.

## 2023-11-24 ENCOUNTER — EMERGENCY (EMERGENCY)
Facility: HOSPITAL | Age: 28
LOS: 0 days | Discharge: HOME | End: 2023-11-24
Attending: STUDENT IN AN ORGANIZED HEALTH CARE EDUCATION/TRAINING PROGRAM
Payer: COMMERCIAL

## 2023-11-24 VITALS
SYSTOLIC BLOOD PRESSURE: 135 MMHG | HEIGHT: 66 IN | HEART RATE: 110 BPM | WEIGHT: 240.08 LBS | TEMPERATURE: 98 F | DIASTOLIC BLOOD PRESSURE: 82 MMHG | RESPIRATION RATE: 18 BRPM | OXYGEN SATURATION: 100 %

## 2023-11-24 VITALS
OXYGEN SATURATION: 98 % | TEMPERATURE: 99 F | RESPIRATION RATE: 18 BRPM | SYSTOLIC BLOOD PRESSURE: 113 MMHG | DIASTOLIC BLOOD PRESSURE: 71 MMHG | HEART RATE: 109 BPM

## 2023-11-24 DIAGNOSIS — Z86.718 PERSONAL HISTORY OF OTHER VENOUS THROMBOSIS AND EMBOLISM: ICD-10-CM

## 2023-11-24 DIAGNOSIS — R06.02 SHORTNESS OF BREATH: ICD-10-CM

## 2023-11-24 DIAGNOSIS — Z79.01 LONG TERM (CURRENT) USE OF ANTICOAGULANTS: ICD-10-CM

## 2023-11-24 DIAGNOSIS — R79.89 OTHER SPECIFIED ABNORMAL FINDINGS OF BLOOD CHEMISTRY: ICD-10-CM

## 2023-11-24 DIAGNOSIS — R05.9 COUGH, UNSPECIFIED: ICD-10-CM

## 2023-11-24 DIAGNOSIS — Z20.822 CONTACT WITH AND (SUSPECTED) EXPOSURE TO COVID-19: ICD-10-CM

## 2023-11-24 DIAGNOSIS — R06.2 WHEEZING: ICD-10-CM

## 2023-11-24 DIAGNOSIS — Z90.89 ACQUIRED ABSENCE OF OTHER ORGANS: Chronic | ICD-10-CM

## 2023-11-24 DIAGNOSIS — Z86.711 PERSONAL HISTORY OF PULMONARY EMBOLISM: ICD-10-CM

## 2023-11-24 LAB
ALBUMIN SERPL ELPH-MCNC: 3.7 G/DL — SIGNIFICANT CHANGE UP (ref 3.3–5)
ALBUMIN SERPL ELPH-MCNC: 3.7 G/DL — SIGNIFICANT CHANGE UP (ref 3.3–5)
ALP SERPL-CCNC: 47 U/L — SIGNIFICANT CHANGE UP (ref 40–120)
ALP SERPL-CCNC: 47 U/L — SIGNIFICANT CHANGE UP (ref 40–120)
ALT FLD-CCNC: 24 U/L — SIGNIFICANT CHANGE UP (ref 12–78)
ALT FLD-CCNC: 24 U/L — SIGNIFICANT CHANGE UP (ref 12–78)
ANION GAP SERPL CALC-SCNC: 7 MMOL/L — SIGNIFICANT CHANGE UP (ref 5–17)
ANION GAP SERPL CALC-SCNC: 7 MMOL/L — SIGNIFICANT CHANGE UP (ref 5–17)
AST SERPL-CCNC: 22 U/L — SIGNIFICANT CHANGE UP (ref 15–37)
AST SERPL-CCNC: 22 U/L — SIGNIFICANT CHANGE UP (ref 15–37)
BASOPHILS # BLD AUTO: 0.06 K/UL — SIGNIFICANT CHANGE UP (ref 0–0.2)
BASOPHILS # BLD AUTO: 0.06 K/UL — SIGNIFICANT CHANGE UP (ref 0–0.2)
BASOPHILS NFR BLD AUTO: 0.6 % — SIGNIFICANT CHANGE UP (ref 0–2)
BASOPHILS NFR BLD AUTO: 0.6 % — SIGNIFICANT CHANGE UP (ref 0–2)
BILIRUB SERPL-MCNC: 0.4 MG/DL — SIGNIFICANT CHANGE UP (ref 0.2–1.2)
BILIRUB SERPL-MCNC: 0.4 MG/DL — SIGNIFICANT CHANGE UP (ref 0.2–1.2)
BUN SERPL-MCNC: 17 MG/DL — SIGNIFICANT CHANGE UP (ref 7–23)
BUN SERPL-MCNC: 17 MG/DL — SIGNIFICANT CHANGE UP (ref 7–23)
CALCIUM SERPL-MCNC: 8.6 MG/DL — SIGNIFICANT CHANGE UP (ref 8.5–10.1)
CALCIUM SERPL-MCNC: 8.6 MG/DL — SIGNIFICANT CHANGE UP (ref 8.5–10.1)
CHLORIDE SERPL-SCNC: 112 MMOL/L — HIGH (ref 96–108)
CHLORIDE SERPL-SCNC: 112 MMOL/L — HIGH (ref 96–108)
CO2 SERPL-SCNC: 19 MMOL/L — LOW (ref 22–31)
CO2 SERPL-SCNC: 19 MMOL/L — LOW (ref 22–31)
CREAT SERPL-MCNC: 1.18 MG/DL — SIGNIFICANT CHANGE UP (ref 0.5–1.3)
CREAT SERPL-MCNC: 1.18 MG/DL — SIGNIFICANT CHANGE UP (ref 0.5–1.3)
D DIMER BLD IA.RAPID-MCNC: 401 NG/ML DDU — HIGH
D DIMER BLD IA.RAPID-MCNC: 401 NG/ML DDU — HIGH
EGFR: 65 ML/MIN/1.73M2 — SIGNIFICANT CHANGE UP
EGFR: 65 ML/MIN/1.73M2 — SIGNIFICANT CHANGE UP
EOSINOPHIL # BLD AUTO: 0.73 K/UL — HIGH (ref 0–0.5)
EOSINOPHIL # BLD AUTO: 0.73 K/UL — HIGH (ref 0–0.5)
EOSINOPHIL NFR BLD AUTO: 7.4 % — HIGH (ref 0–6)
EOSINOPHIL NFR BLD AUTO: 7.4 % — HIGH (ref 0–6)
FLUAV AG NPH QL: SIGNIFICANT CHANGE UP
FLUAV AG NPH QL: SIGNIFICANT CHANGE UP
FLUBV AG NPH QL: SIGNIFICANT CHANGE UP
FLUBV AG NPH QL: SIGNIFICANT CHANGE UP
GLUCOSE SERPL-MCNC: 103 MG/DL — HIGH (ref 70–99)
GLUCOSE SERPL-MCNC: 103 MG/DL — HIGH (ref 70–99)
HCG SERPL-ACNC: <1 MIU/ML — SIGNIFICANT CHANGE UP
HCG SERPL-ACNC: <1 MIU/ML — SIGNIFICANT CHANGE UP
HCT VFR BLD CALC: 32.2 % — LOW (ref 34.5–45)
HCT VFR BLD CALC: 32.2 % — LOW (ref 34.5–45)
HGB BLD-MCNC: 9.4 G/DL — LOW (ref 11.5–15.5)
HGB BLD-MCNC: 9.4 G/DL — LOW (ref 11.5–15.5)
IMM GRANULOCYTES NFR BLD AUTO: 0.3 % — SIGNIFICANT CHANGE UP (ref 0–0.9)
IMM GRANULOCYTES NFR BLD AUTO: 0.3 % — SIGNIFICANT CHANGE UP (ref 0–0.9)
LYMPHOCYTES # BLD AUTO: 2.67 K/UL — SIGNIFICANT CHANGE UP (ref 1–3.3)
LYMPHOCYTES # BLD AUTO: 2.67 K/UL — SIGNIFICANT CHANGE UP (ref 1–3.3)
LYMPHOCYTES # BLD AUTO: 27 % — SIGNIFICANT CHANGE UP (ref 13–44)
LYMPHOCYTES # BLD AUTO: 27 % — SIGNIFICANT CHANGE UP (ref 13–44)
MCHC RBC-ENTMCNC: 18.8 PG — LOW (ref 27–34)
MCHC RBC-ENTMCNC: 18.8 PG — LOW (ref 27–34)
MCHC RBC-ENTMCNC: 29.2 G/DL — LOW (ref 32–36)
MCHC RBC-ENTMCNC: 29.2 G/DL — LOW (ref 32–36)
MCV RBC AUTO: 64.4 FL — LOW (ref 80–100)
MCV RBC AUTO: 64.4 FL — LOW (ref 80–100)
MONOCYTES # BLD AUTO: 0.53 K/UL — SIGNIFICANT CHANGE UP (ref 0–0.9)
MONOCYTES # BLD AUTO: 0.53 K/UL — SIGNIFICANT CHANGE UP (ref 0–0.9)
MONOCYTES NFR BLD AUTO: 5.4 % — SIGNIFICANT CHANGE UP (ref 2–14)
MONOCYTES NFR BLD AUTO: 5.4 % — SIGNIFICANT CHANGE UP (ref 2–14)
NEUTROPHILS # BLD AUTO: 5.88 K/UL — SIGNIFICANT CHANGE UP (ref 1.8–7.4)
NEUTROPHILS # BLD AUTO: 5.88 K/UL — SIGNIFICANT CHANGE UP (ref 1.8–7.4)
NEUTROPHILS NFR BLD AUTO: 59.3 % — SIGNIFICANT CHANGE UP (ref 43–77)
NEUTROPHILS NFR BLD AUTO: 59.3 % — SIGNIFICANT CHANGE UP (ref 43–77)
NRBC # BLD: 0 /100 WBCS — SIGNIFICANT CHANGE UP (ref 0–0)
NRBC # BLD: 0 /100 WBCS — SIGNIFICANT CHANGE UP (ref 0–0)
PLATELET # BLD AUTO: 293 K/UL — SIGNIFICANT CHANGE UP (ref 150–400)
PLATELET # BLD AUTO: 293 K/UL — SIGNIFICANT CHANGE UP (ref 150–400)
POTASSIUM SERPL-MCNC: 3.8 MMOL/L — SIGNIFICANT CHANGE UP (ref 3.5–5.3)
POTASSIUM SERPL-MCNC: 3.8 MMOL/L — SIGNIFICANT CHANGE UP (ref 3.5–5.3)
POTASSIUM SERPL-SCNC: 3.8 MMOL/L — SIGNIFICANT CHANGE UP (ref 3.5–5.3)
POTASSIUM SERPL-SCNC: 3.8 MMOL/L — SIGNIFICANT CHANGE UP (ref 3.5–5.3)
PROT SERPL-MCNC: 7.9 GM/DL — SIGNIFICANT CHANGE UP (ref 6–8.3)
PROT SERPL-MCNC: 7.9 GM/DL — SIGNIFICANT CHANGE UP (ref 6–8.3)
RBC # BLD: 5 M/UL — SIGNIFICANT CHANGE UP (ref 3.8–5.2)
RBC # BLD: 5 M/UL — SIGNIFICANT CHANGE UP (ref 3.8–5.2)
RBC # FLD: 20.6 % — HIGH (ref 10.3–14.5)
RBC # FLD: 20.6 % — HIGH (ref 10.3–14.5)
SARS-COV-2 RNA SPEC QL NAA+PROBE: SIGNIFICANT CHANGE UP
SARS-COV-2 RNA SPEC QL NAA+PROBE: SIGNIFICANT CHANGE UP
SODIUM SERPL-SCNC: 138 MMOL/L — SIGNIFICANT CHANGE UP (ref 135–145)
SODIUM SERPL-SCNC: 138 MMOL/L — SIGNIFICANT CHANGE UP (ref 135–145)
TROPONIN I, HIGH SENSITIVITY RESULT: <3 NG/L — SIGNIFICANT CHANGE UP
TROPONIN I, HIGH SENSITIVITY RESULT: <3 NG/L — SIGNIFICANT CHANGE UP
WBC # BLD: 9.9 K/UL — SIGNIFICANT CHANGE UP (ref 3.8–10.5)
WBC # BLD: 9.9 K/UL — SIGNIFICANT CHANGE UP (ref 3.8–10.5)
WBC # FLD AUTO: 9.9 K/UL — SIGNIFICANT CHANGE UP (ref 3.8–10.5)
WBC # FLD AUTO: 9.9 K/UL — SIGNIFICANT CHANGE UP (ref 3.8–10.5)

## 2023-11-24 PROCEDURE — 99285 EMERGENCY DEPT VISIT HI MDM: CPT

## 2023-11-24 PROCEDURE — 71045 X-RAY EXAM CHEST 1 VIEW: CPT | Mod: 26

## 2023-11-24 PROCEDURE — 93010 ELECTROCARDIOGRAM REPORT: CPT

## 2023-11-24 PROCEDURE — 71275 CT ANGIOGRAPHY CHEST: CPT | Mod: 26,MA

## 2023-11-24 RX ORDER — ALBUTEROL 90 UG/1
2 AEROSOL, METERED ORAL
Qty: 1 | Refills: 0
Start: 2023-11-24

## 2023-11-24 RX ORDER — IPRATROPIUM/ALBUTEROL SULFATE 18-103MCG
3 AEROSOL WITH ADAPTER (GRAM) INHALATION ONCE
Refills: 0 | Status: COMPLETED | OUTPATIENT
Start: 2023-11-24 | End: 2023-11-24

## 2023-11-24 RX ORDER — APIXABAN 2.5 MG/1
1 TABLET, FILM COATED ORAL
Refills: 0 | DISCHARGE

## 2023-11-24 RX ADMIN — Medication 3 MILLILITER(S): at 02:33

## 2023-11-24 NOTE — ED ADULT NURSE NOTE - CHIEF COMPLAINT
10/01/20        Lauri Oreilly  [address] [place] [address]    
    Jay Orozco Jr., MD        2018      Re: PAOLO STOVALL  MR#: [id number]   #: [id number]      To Whom It May Concern:    The patient has had a seizure disorder, and has been in remission since .  He tolerates medication without any side effects.  The patient did have a seizure in  as well as a seizure in .    I have been asked to confirm this in the form of a letter for an appeal with regard to driving implications.  The patient has been clearly compliant with his medications.    Sincerely,          Jay Orozco Jr., MD    [facility]  [address] [address]  [phone]      
The patient is a 28y Female complaining of difficulty breathing.

## 2023-11-24 NOTE — ED PROVIDER NOTE - CLINICAL SUMMARY MEDICAL DECISION MAKING FREE TEXT BOX
Clinical presentation likely secondary to reactive airway disease from viral infection, will rule out pneumonia versus PE with labs and chest x-ray.  Patient already received treatment for asthma exacerbation, will give more DuoNebs, patient already endorsing improvement of symptoms.    EKG benign, D-dimer elevated, will order CTA

## 2023-11-24 NOTE — ED ADULT TRIAGE NOTE - CHIEF COMPLAINT QUOTE
bibems from home for difficulty breathing since this morning, getting progressively worse.  pt received 2x duo nebs, 12mg dex IV, and 2g mag IV via 18g IV left AC.  hx of clots in lungs and legs.  nkda. bibems from home for difficulty breathing since this morning, getting progressively worse.  pt received 2x duo nebs, 12mg dex IV, and 2g mag IV via 18g IV left AC.  Pt NAD noted at this time, endorses feeling better, able to speak in full sentences.   hx of clots in lungs and legs.  nkda.

## 2023-11-24 NOTE — ED PROVIDER NOTE - NSICDXPASTMEDICALHX_GEN_ALL_CORE_FT
PAST MEDICAL HISTORY:  DVT (deep venous thrombosis)     PFO (patent foramen ovale)     Pulmonary embolus

## 2023-11-24 NOTE — ED ADULT NURSE NOTE - CHIEF COMPLAINT QUOTE
bibems from home for difficulty breathing since this morning, getting progressively worse.  pt received 2x duo nebs, 12mg dex IV, and 2g mag IV via 18g IV left AC.  Pt NAD noted at this time, endorses feeling better, able to speak in full sentences.   hx of clots in lungs and legs.  nkda.

## 2023-11-24 NOTE — ED PROVIDER NOTE - NS ED ROS FT
CONST: no fevers, no chills  EYES: no pain, no vision changes  ENT: no sore throat, no ear pain, no change in hearing  CV: no chest pain, no leg swelling  RESP: (+)_ shortness of breath, (+) cough  ABD: no abdominal pain, no nausea, no vomiting, no diarrhea  : no dysuria, no flank pain, no hematuria  MSK: no back pain, no extremity pain  NEURO: no headache or additional neurologic complaints  HEME: no easy bleeding  SKIN:  no rash

## 2023-11-24 NOTE — ED PROVIDER NOTE - OBJECTIVE STATEMENT
28-year-old female with past medical history of PE/DVT on Eliquis presenting with shortness of breath for 5 days.  Patient endorses accompanying cough and wheezing, no prior history of asthma.  Was found wheezing by EMS so given steroid DuoNeb magnesium, patient endorses improvement.  Denies chest pain but endorses mild back pain.  States he has been compliant with Eliquis.  States she has protein S deficiency which create hypercoagulability.  Denies new leg swelling, denies fever, denies sick contacts

## 2023-11-24 NOTE — ED ADULT TRIAGE NOTE - WEIGHT IN LBS
Will discuss blood work results with patient at appointment on 07/17  Patient's vitamin B12 and vitamin-D are still low  Triglycerides elevated  A1c has improved 
240

## 2023-11-24 NOTE — ED ADULT NURSE NOTE - CHPI ED NUR SYMPTOMS POS
Back pain/COUGH/DIFFICULTY BREATHING/DYSPNEA ON EXERTION/NASAL CONGESTION/SHORTNESS OF BREATH/WHEEZING

## 2023-11-24 NOTE — ED PROVIDER NOTE - PATIENT PORTAL LINK FT
You can access the FollowMyHealth Patient Portal offered by [facility] by registering at the following website: [email or web address]. By joining [employer]’s FollowMyHealth portal, you will also be able to view your health information using other applications (apps) compatible with our system.

## 2023-11-24 NOTE — ED PROVIDER NOTE - PHYSICAL EXAMINATION
General: No acute distress, mentation at baseline,  well nourished, well developed  HEENT: NCAT, Neck supple without meningismus, PERRL, no conjunctival injection  Lungs: CTAB, mild diffuse exp wheeizng,, No retractions, No increased work of breathing  Heart: S1S2 RRR, No M/R/G, Pules equal Bilaterally in upper and lower extremities distally  Abd: soft, NT/ND, No guarding, No rebound.  No hernias, no palpable masses.  Extrem: FROM in all joints, no gross deformities appreciated, no significant edema noted, No ulcers. Cap refil < 2sec.  Skin: No rash noted, warm dry.  Neuro:  Grossly normal.  No difficulty ambulating. No focal deficits.  Psychiatric: Appropriate mood and affect.

## 2024-04-03 ENCOUNTER — OUTPATIENT (OUTPATIENT)
Dept: OUTPATIENT SERVICES | Facility: HOSPITAL | Age: 29
LOS: 1 days | Discharge: HOME | End: 2024-04-03

## 2024-04-03 DIAGNOSIS — I26.99 OTHER PULMONARY EMBOLISM WITHOUT ACUTE COR PULMONALE: ICD-10-CM

## 2024-04-12 ENCOUNTER — APPOINTMENT (OUTPATIENT)
Dept: HEMATOLOGY ONCOLOGY | Facility: CLINIC | Age: 29
End: 2024-04-12
Payer: COMMERCIAL

## 2024-04-12 DIAGNOSIS — J45.909 UNSPECIFIED ASTHMA, UNCOMPLICATED: ICD-10-CM

## 2024-04-12 PROCEDURE — 99213 OFFICE O/P EST LOW 20 MIN: CPT

## 2024-04-12 RX ORDER — ALBUTEROL SULFATE 90 UG/1
108 (90 BASE) INHALANT RESPIRATORY (INHALATION) EVERY 6 HOURS
Qty: 1 | Refills: 3 | Status: ACTIVE | COMMUNITY
Start: 2024-04-12 | End: 1900-01-01

## 2024-04-12 RX ORDER — APIXABAN 2.5 MG/1
2.5 TABLET, FILM COATED ORAL
Qty: 180 | Refills: 3 | Status: ACTIVE | COMMUNITY
Start: 2021-02-04 | End: 1900-01-01

## 2024-04-12 NOTE — HISTORY OF PRESENT ILLNESS
[de-identified] :   28 year old woman with past medical history of recent tonsillectomy, bilateral pulmonary embolism and LLE DVT here for evaluation of hypercoagulability. Patient was admitted at [facility] from 1/1/2021-1/14/2021 for c/o chest pain. She was found to have bilateral lobar pulmonary embolism, LLE DVT from common iliac vein extending into internal iliac vein and left lower pole renal infarct. She was initially placed on Heparin drip and then changed to Fondaparinox (Arixtra). She underwent echocardiogram that showed patent foramen ovale. She had a tonsillectomy on 12/15/2020 and spent most of her time recovering in bed. She was on oral contraceptives for approx three months for dysmenorrhea. She also travelled to [place] by car on 12/24/2020 and returned on 12/26/2020 and did not make any rest stops to stretch. This AM, she had worsening chest pain. She went back to [facility] ER via ambulance. Repeat CTA showed decreased clot burden with residual emboli noted within the right upper and left lower segmental artery branches and near complete resolution of LLL pulmonary infarct. Also seen non-specific nodular and patchy ground glass opacities. She was discharged home on Arixtra. She c/o chest pain when laying down and occasional SOB. She is tolerating Arixtra well. Her mother is injecting her daily. She reports that LLE swelling has improved. She still has some soreness but is able to walk with walker. She has home PT once per week. She c/o some fatigue. She denies fevers, pleuritic chest pain, abd pain, n/v/d, black/bloody stools, dizziness, night sweats, swollen glands, hematuria, epistaxis, bleeding gums, unexplained bruising or rashes. She denies recent COVID infection or exposure.   PMHx/PSHx: Bilateral lobar PE, LLE DVT, left renal infarct 1/2021 Patent foramen ovale seen on echocardiogram 1/2021 Tonsillectomy 12/15/2020 for chronic tonsillitis. Pathology negative. Dr. Brandon Manuel at [facility] Menorrhagia and irregular menses was on oral contraceptives X 3 months Denies history or pregnancies or miscarriages.   Hospitalizations: [facility] 1/1/2021- 1/14/2021 for DVT/PE as described in HPI  Medications: Oxycodone  Fondaparinux 7.5mg SC daily  Allergies:  NKDA  Social History: Lives with mother, father, sister and brother. Works as an . Graduated [email or web address] last year. Single. Denies smoking. Drinks socially. Born in [place]  Family History: Father alive- had DVT- risk factor was plane ride Mother alive- has RA and anemia- is on Humira and MTX Denies further family history of DVT, PE, CVA, miscarriages.   Healthcare Maintenance: Dr. Kelly Kilpatrick- new primary care doctor Dr. Pham- vascular surgery- saw recently and recommended compression stockings.     [de-identified] : After last visit, patient moved down to [facility], missed her train back to [place] to come up for the appointment  Patient has no LE edema or limb pain, does hace compression stockings for flights.  denies chest pain or shortness of breath Was in [place] with some SOB, had a CT angiogram that did not find a PE, was eventually diagnosed with a URI.

## 2024-04-12 NOTE — ASSESSMENT
[FreeTextEntry1] : This is a 27 year old woman with a history of provoked venous thromboemboli, Protein S deficiency confirmed activity and antigen consistently in the 11-14% range outside of the initial event.    Patient had a few partial risk factors, including some long car rides, and minor surgeries, however the major risk factor was the use of oral contraceptives in the months preceding the DVT/PE.  Patient was noted to have a low protein S activity which continued to be low.  Given hypercoagulable state, protein S deficiency, will continue elqiuis at the prophylactic dose 2.5mg BID. Recommended she connect with the [facility] hematology practice given it would be much easier to see a hematologist in [place] rather than attempt to commute back to [place].  Renewed patient's Albuterol inhaler given that  had not had a PCP or pulmonologist in [place] yet.

## 2024-08-26 NOTE — ED ADULT NURSE NOTE - DISCHARGE DATE/TIME
04-Jan-2022 09:15 [Cardiac Failure] : cardiac failure [Arrhythmia/ECG Abnorrmalities] : arrhythmia/ECG abnormalities [Spouse] : spouse [FreeTextEntry3] : Rolo Ruiz

## 2025-02-05 NOTE — ED PROVIDER NOTE - NSICDXPASTMEDICALHX_GEN_ALL_CORE_FT
PAST MEDICAL HISTORY:  DVT (deep venous thrombosis)     PFO (patent foramen ovale)     Pulmonary embolus      Initiate Treatment: tacrolimus bid mon through fri\\nmometasone bid sat/sun Detail Level: Zone Render In Strict Bullet Format?: No Plan: Consider adding doxy at f/u. Possibly rosacea